# Patient Record
Sex: MALE | Race: BLACK OR AFRICAN AMERICAN | NOT HISPANIC OR LATINO | Employment: UNEMPLOYED | URBAN - METROPOLITAN AREA
[De-identification: names, ages, dates, MRNs, and addresses within clinical notes are randomized per-mention and may not be internally consistent; named-entity substitution may affect disease eponyms.]

---

## 2021-01-01 ENCOUNTER — HOSPITAL ENCOUNTER (INPATIENT)
Facility: HOSPITAL | Age: 51
LOS: 5 days | End: 2021-01-19
Attending: INTERNAL MEDICINE | Admitting: INTERNAL MEDICINE

## 2021-01-01 ENCOUNTER — APPOINTMENT (OUTPATIENT)
Dept: GENERAL RADIOLOGY | Facility: HOSPITAL | Age: 51
End: 2021-01-01

## 2021-01-01 ENCOUNTER — HOSPITAL ENCOUNTER (INPATIENT)
Facility: HOSPITAL | Age: 51
LOS: 5 days | End: 2021-01-14
Attending: EMERGENCY MEDICINE | Admitting: INTERNAL MEDICINE

## 2021-01-01 ENCOUNTER — APPOINTMENT (OUTPATIENT)
Dept: CT IMAGING | Facility: HOSPITAL | Age: 51
End: 2021-01-01

## 2021-01-01 ENCOUNTER — APPOINTMENT (OUTPATIENT)
Dept: CARDIOLOGY | Facility: HOSPITAL | Age: 51
End: 2021-01-01

## 2021-01-01 ENCOUNTER — APPOINTMENT (OUTPATIENT)
Dept: MRI IMAGING | Facility: HOSPITAL | Age: 51
End: 2021-01-01

## 2021-01-01 VITALS
SYSTOLIC BLOOD PRESSURE: 171 MMHG | BODY MASS INDEX: 30.54 KG/M2 | OXYGEN SATURATION: 98 % | RESPIRATION RATE: 18 BRPM | DIASTOLIC BLOOD PRESSURE: 93 MMHG | TEMPERATURE: 100 F | HEIGHT: 74 IN | HEART RATE: 92 BPM | WEIGHT: 238 LBS

## 2021-01-01 VITALS
DIASTOLIC BLOOD PRESSURE: 89 MMHG | SYSTOLIC BLOOD PRESSURE: 165 MMHG | RESPIRATION RATE: 16 BRPM | TEMPERATURE: 99.7 F | HEART RATE: 95 BPM | OXYGEN SATURATION: 97 %

## 2021-01-01 DIAGNOSIS — R09.02 HYPOXIA: Primary | ICD-10-CM

## 2021-01-01 DIAGNOSIS — R41.82 ALTERED MENTAL STATUS, UNSPECIFIED ALTERED MENTAL STATUS TYPE: ICD-10-CM

## 2021-01-01 DIAGNOSIS — Z86.73 RECENT CEREBROVASCULAR ACCIDENT (CVA): ICD-10-CM

## 2021-01-01 DIAGNOSIS — J18.9 PNEUMONIA OF BOTH LUNGS DUE TO INFECTIOUS ORGANISM, UNSPECIFIED PART OF LUNG: ICD-10-CM

## 2021-01-01 LAB
ALBUMIN SERPL-MCNC: 2.8 G/DL (ref 3.5–5.2)
ALBUMIN SERPL-MCNC: 3.6 G/DL (ref 3.5–5.2)
ALBUMIN/GLOB SERPL: 0.9 G/DL
ALP SERPL-CCNC: 100 U/L (ref 39–117)
ALP SERPL-CCNC: 114 U/L (ref 39–117)
ALT SERPL W P-5'-P-CCNC: 11 U/L (ref 1–41)
ALT SERPL W P-5'-P-CCNC: 8 U/L (ref 1–41)
AMMONIA BLD-SCNC: 37 UMOL/L (ref 16–60)
AMPHET+METHAMPHET UR QL: NEGATIVE
AMPHETAMINES UR QL: NEGATIVE
ANION GAP SERPL CALCULATED.3IONS-SCNC: 11 MMOL/L (ref 5–15)
ANION GAP SERPL CALCULATED.3IONS-SCNC: 12 MMOL/L (ref 5–15)
ANION GAP SERPL CALCULATED.3IONS-SCNC: 14 MMOL/L (ref 5–15)
ANION GAP SERPL CALCULATED.3IONS-SCNC: 8 MMOL/L (ref 5–15)
ANION GAP SERPL CALCULATED.3IONS-SCNC: 9 MMOL/L (ref 5–15)
ARTERIAL PATENCY WRIST A: ABNORMAL
ARTERIAL PATENCY WRIST A: ABNORMAL
ASCENDING AORTA: 3.2 CM
AST SERPL-CCNC: 12 U/L (ref 1–40)
AST SERPL-CCNC: 16 U/L (ref 1–40)
ATMOSPHERIC PRESS: ABNORMAL MM[HG]
ATMOSPHERIC PRESS: ABNORMAL MM[HG]
BACTERIA SPEC AEROBE CULT: NORMAL
BACTERIA SPEC AEROBE CULT: NORMAL
BACTERIA SPEC RESP CULT: NORMAL
BACTERIA UR QL AUTO: NORMAL /HPF
BARBITURATES UR QL SCN: NEGATIVE
BASE EXCESS BLDA CALC-SCNC: 6.9 MMOL/L (ref 0–2)
BASE EXCESS BLDA CALC-SCNC: 7.1 MMOL/L (ref 0–2)
BASOPHILS # BLD AUTO: 0.09 10*3/MM3 (ref 0–0.2)
BASOPHILS # BLD AUTO: 0.1 10*3/MM3 (ref 0–0.2)
BASOPHILS NFR BLD AUTO: 0.4 % (ref 0–1.5)
BASOPHILS NFR BLD AUTO: 0.5 % (ref 0–1.5)
BDY SITE: ABNORMAL
BDY SITE: ABNORMAL
BENZODIAZ UR QL SCN: NEGATIVE
BH CV ECHO MEAS - AO MAX PG (FULL): 4.2 MMHG
BH CV ECHO MEAS - AO MAX PG: 10.1 MMHG
BH CV ECHO MEAS - AO ROOT AREA (BSA CORRECTED): 1.4
BH CV ECHO MEAS - AO ROOT AREA: 8.4 CM^2
BH CV ECHO MEAS - AO ROOT DIAM: 3.3 CM
BH CV ECHO MEAS - AO V2 MAX: 159.3 CM/SEC
BH CV ECHO MEAS - ASC AORTA: 3.2 CM
BH CV ECHO MEAS - AVA(V,A): 2.2 CM^2
BH CV ECHO MEAS - AVA(V,D): 2.2 CM^2
BH CV ECHO MEAS - BSA(HAYCOCK): 2.4 M^2
BH CV ECHO MEAS - BSA: 2.3 M^2
BH CV ECHO MEAS - BZI_BMI: 30.6 KILOGRAMS/M^2
BH CV ECHO MEAS - BZI_METRIC_HEIGHT: 188 CM
BH CV ECHO MEAS - BZI_METRIC_WEIGHT: 108 KG
BH CV ECHO MEAS - EDV(CUBED): 104.1 ML
BH CV ECHO MEAS - EDV(MOD-SP2): 94 ML
BH CV ECHO MEAS - EDV(MOD-SP4): 89 ML
BH CV ECHO MEAS - EDV(TEICH): 102.6 ML
BH CV ECHO MEAS - EF(CUBED): 70.9 %
BH CV ECHO MEAS - EF(MOD-BP): 58 %
BH CV ECHO MEAS - EF(MOD-SP2): 59.6 %
BH CV ECHO MEAS - EF(MOD-SP4): 56.2 %
BH CV ECHO MEAS - EF(TEICH): 62.5 %
BH CV ECHO MEAS - ESV(CUBED): 30.3 ML
BH CV ECHO MEAS - ESV(MOD-SP2): 38 ML
BH CV ECHO MEAS - ESV(MOD-SP4): 39 ML
BH CV ECHO MEAS - ESV(TEICH): 38.5 ML
BH CV ECHO MEAS - FS: 33.7 %
BH CV ECHO MEAS - IVS/LVPW: 1.1
BH CV ECHO MEAS - IVSD: 1.2 CM
BH CV ECHO MEAS - LA DIMENSION: 3.3 CM
BH CV ECHO MEAS - LA/AO: 1
BH CV ECHO MEAS - LAD MAJOR: 5.5 CM
BH CV ECHO MEAS - LAT PEAK E' VEL: 11.2 CM/SEC
BH CV ECHO MEAS - LATERAL E/E' RATIO: 7
BH CV ECHO MEAS - LV DIASTOLIC VOL/BSA (35-75): 38 ML/M^2
BH CV ECHO MEAS - LV IVRT: 0.1 SEC
BH CV ECHO MEAS - LV MASS(C)D: 195.7 GRAMS
BH CV ECHO MEAS - LV MASS(C)DI: 83.7 GRAMS/M^2
BH CV ECHO MEAS - LV MAX PG: 5.9 MMHG
BH CV ECHO MEAS - LV MEAN PG: 3.4 MMHG
BH CV ECHO MEAS - LV SYSTOLIC VOL/BSA (12-30): 16.7 ML/M^2
BH CV ECHO MEAS - LV V1 MAX: 121.9 CM/SEC
BH CV ECHO MEAS - LV V1 MEAN: 84.6 CM/SEC
BH CV ECHO MEAS - LV V1 VTI: 21.4 CM
BH CV ECHO MEAS - LVIDD: 4.7 CM
BH CV ECHO MEAS - LVIDS: 3.1 CM
BH CV ECHO MEAS - LVLD AP2: 8 CM
BH CV ECHO MEAS - LVLD AP4: 8.2 CM
BH CV ECHO MEAS - LVLS AP2: 7.3 CM
BH CV ECHO MEAS - LVLS AP4: 7 CM
BH CV ECHO MEAS - LVOT AREA (M): 2.8 CM^2
BH CV ECHO MEAS - LVOT AREA: 2.9 CM^2
BH CV ECHO MEAS - LVOT DIAM: 1.9 CM
BH CV ECHO MEAS - LVPWD: 1.1 CM
BH CV ECHO MEAS - MED PEAK E' VEL: 9.4 CM/SEC
BH CV ECHO MEAS - MEDIAL E/E' RATIO: 8.2
BH CV ECHO MEAS - MV A MAX VEL: 89.8 CM/SEC
BH CV ECHO MEAS - MV DEC TIME: 0.17 SEC
BH CV ECHO MEAS - MV E MAX VEL: 79.1 CM/SEC
BH CV ECHO MEAS - MV E/A: 0.88
BH CV ECHO MEAS - PA ACC SLOPE: 790 CM/SEC^2
BH CV ECHO MEAS - PA ACC TIME: 0.13 SEC
BH CV ECHO MEAS - PA MAX PG: 6 MMHG
BH CV ECHO MEAS - PA PR(ACCEL): 20.9 MMHG
BH CV ECHO MEAS - PA V2 MAX: 122.7 CM/SEC
BH CV ECHO MEAS - SI(CUBED): 31.5 ML/M^2
BH CV ECHO MEAS - SI(LVOT): 26.6 ML/M^2
BH CV ECHO MEAS - SI(MOD-SP2): 23.9 ML/M^2
BH CV ECHO MEAS - SI(MOD-SP4): 21.4 ML/M^2
BH CV ECHO MEAS - SI(TEICH): 27.4 ML/M^2
BH CV ECHO MEAS - SV(CUBED): 73.8 ML
BH CV ECHO MEAS - SV(LVOT): 62.3 ML
BH CV ECHO MEAS - SV(MOD-SP2): 56 ML
BH CV ECHO MEAS - SV(MOD-SP4): 50 ML
BH CV ECHO MEAS - SV(TEICH): 64.1 ML
BH CV ECHO MEAS - TAPSE (>1.6): 2.2 CM
BH CV ECHO MEASUREMENTS AVERAGE E/E' RATIO: 7.68
BH CV VAS BP LEFT ARM: NORMAL MMHG
BH CV XLRA - RV BASE: 3.8 CM
BH CV XLRA - RV LENGTH: 9.7 CM
BH CV XLRA - RV MID: 2.9 CM
BH CV XLRA - TDI S': 18.2 CM/SEC
BILIRUB SERPL-MCNC: 0.6 MG/DL (ref 0–1.2)
BILIRUB SERPL-MCNC: 0.6 MG/DL (ref 0–1.2)
BILIRUB UR QL STRIP: NEGATIVE
BODY TEMPERATURE: 37 C
BODY TEMPERATURE: 37 C
BUN SERPL-MCNC: 12 MG/DL (ref 6–20)
BUN SERPL-MCNC: 7 MG/DL (ref 6–20)
BUN SERPL-MCNC: 7 MG/DL (ref 6–20)
BUN SERPL-MCNC: 8 MG/DL (ref 6–20)
BUN SERPL-MCNC: 8 MG/DL (ref 6–20)
BUN/CREAT SERPL: 5.5 (ref 7–25)
BUN/CREAT SERPL: 7 (ref 7–25)
BUN/CREAT SERPL: 7.3 (ref 7–25)
BUN/CREAT SERPL: 7.8 (ref 7–25)
BUPRENORPHINE SERPL-MCNC: NEGATIVE NG/ML
CALCIUM SPEC-SCNC: 8.5 MG/DL (ref 8.6–10.5)
CALCIUM SPEC-SCNC: 8.6 MG/DL (ref 8.6–10.5)
CALCIUM SPEC-SCNC: 8.7 MG/DL (ref 8.6–10.5)
CANNABINOIDS SERPL QL: NEGATIVE
CHLORIDE SERPL-SCNC: 100 MMOL/L (ref 98–107)
CHLORIDE SERPL-SCNC: 100 MMOL/L (ref 98–107)
CHLORIDE SERPL-SCNC: 103 MMOL/L (ref 98–107)
CHLORIDE SERPL-SCNC: 98 MMOL/L (ref 98–107)
CHLORIDE SERPL-SCNC: 99 MMOL/L (ref 98–107)
CHOLEST SERPL-MCNC: 122 MG/DL (ref 0–200)
CHOLEST SERPL-MCNC: 124 MG/DL (ref 0–200)
CLARITY UR: CLEAR
CO2 BLDA-SCNC: 33.8 MMOL/L (ref 22–33)
CO2 BLDA-SCNC: 36 MMOL/L (ref 22–33)
CO2 SERPL-SCNC: 25 MMOL/L (ref 22–29)
CO2 SERPL-SCNC: 25 MMOL/L (ref 22–29)
CO2 SERPL-SCNC: 27 MMOL/L (ref 22–29)
CO2 SERPL-SCNC: 28 MMOL/L (ref 22–29)
CO2 SERPL-SCNC: 29 MMOL/L (ref 22–29)
COCAINE UR QL: POSITIVE
COHGB MFR BLD: 1.6 % (ref 0–2)
COHGB MFR BLD: 1.6 % (ref 0–2)
COLOR UR: ABNORMAL
CREAT SERPL-MCNC: 0.96 MG/DL (ref 0.76–1.27)
CREAT SERPL-MCNC: 1.02 MG/DL (ref 0.76–1.27)
CREAT SERPL-MCNC: 1.04 MG/DL (ref 0.76–1.27)
CREAT SERPL-MCNC: 1.15 MG/DL (ref 0.76–1.27)
CREAT SERPL-MCNC: 1.28 MG/DL (ref 0.76–1.27)
CRP SERPL-MCNC: 2.83 MG/DL (ref 0–0.5)
D-LACTATE SERPL-SCNC: 0.9 MMOL/L (ref 0.5–2)
DEPRECATED RDW RBC AUTO: 36.1 FL (ref 37–54)
DEPRECATED RDW RBC AUTO: 36.9 FL (ref 37–54)
DEPRECATED RDW RBC AUTO: 37.4 FL (ref 37–54)
DEPRECATED RDW RBC AUTO: 37.4 FL (ref 37–54)
DEPRECATED RDW RBC AUTO: 37.7 FL (ref 37–54)
EOSINOPHIL # BLD AUTO: 0.06 10*3/MM3 (ref 0–0.4)
EOSINOPHIL # BLD AUTO: 0.08 10*3/MM3 (ref 0–0.4)
EOSINOPHIL # BLD AUTO: 0.13 10*3/MM3 (ref 0–0.4)
EOSINOPHIL # BLD AUTO: 0.53 10*3/MM3 (ref 0–0.4)
EOSINOPHIL NFR BLD AUTO: 0.3 % (ref 0.3–6.2)
EOSINOPHIL NFR BLD AUTO: 0.4 % (ref 0.3–6.2)
EOSINOPHIL NFR BLD AUTO: 0.7 % (ref 0.3–6.2)
EOSINOPHIL NFR BLD AUTO: 3 % (ref 0.3–6.2)
EPAP: 0
EPAP: 0
ERYTHROCYTE [DISTWIDTH] IN BLOOD BY AUTOMATED COUNT: 11.1 % (ref 12.3–15.4)
ERYTHROCYTE [DISTWIDTH] IN BLOOD BY AUTOMATED COUNT: 11.2 % (ref 12.3–15.4)
ERYTHROCYTE [DISTWIDTH] IN BLOOD BY AUTOMATED COUNT: 11.2 % (ref 12.3–15.4)
ERYTHROCYTE [SEDIMENTATION RATE] IN BLOOD: 35 MM/HR (ref 0–20)
FLUAV RNA RESP QL NAA+PROBE: NOT DETECTED
FLUBV RNA RESP QL NAA+PROBE: NOT DETECTED
GFR SERPL CREATININE-BSD FRML MDRD: 100 ML/MIN/1.73
GFR SERPL CREATININE-BSD FRML MDRD: 72 ML/MIN/1.73
GFR SERPL CREATININE-BSD FRML MDRD: 81 ML/MIN/1.73
GFR SERPL CREATININE-BSD FRML MDRD: 93 ML/MIN/1.73
GLOBULIN UR ELPH-MCNC: 4 GM/DL
GLUCOSE BLDC GLUCOMTR-MCNC: 121 MG/DL (ref 70–130)
GLUCOSE BLDC GLUCOMTR-MCNC: 126 MG/DL (ref 70–130)
GLUCOSE BLDC GLUCOMTR-MCNC: 135 MG/DL (ref 70–130)
GLUCOSE BLDC GLUCOMTR-MCNC: 148 MG/DL (ref 70–130)
GLUCOSE BLDC GLUCOMTR-MCNC: 152 MG/DL (ref 70–130)
GLUCOSE BLDC GLUCOMTR-MCNC: 163 MG/DL (ref 70–130)
GLUCOSE BLDC GLUCOMTR-MCNC: 172 MG/DL (ref 70–130)
GLUCOSE BLDC GLUCOMTR-MCNC: 175 MG/DL (ref 70–130)
GLUCOSE BLDC GLUCOMTR-MCNC: 187 MG/DL (ref 70–130)
GLUCOSE BLDC GLUCOMTR-MCNC: 190 MG/DL (ref 70–130)
GLUCOSE BLDC GLUCOMTR-MCNC: 202 MG/DL (ref 70–130)
GLUCOSE BLDC GLUCOMTR-MCNC: 206 MG/DL (ref 70–130)
GLUCOSE BLDC GLUCOMTR-MCNC: 220 MG/DL (ref 70–130)
GLUCOSE BLDC GLUCOMTR-MCNC: 228 MG/DL (ref 70–130)
GLUCOSE BLDC GLUCOMTR-MCNC: 250 MG/DL (ref 70–130)
GLUCOSE BLDC GLUCOMTR-MCNC: 258 MG/DL (ref 70–130)
GLUCOSE BLDC GLUCOMTR-MCNC: 263 MG/DL (ref 70–130)
GLUCOSE BLDC GLUCOMTR-MCNC: 99 MG/DL (ref 70–130)
GLUCOSE SERPL-MCNC: 140 MG/DL (ref 65–99)
GLUCOSE SERPL-MCNC: 168 MG/DL (ref 65–99)
GLUCOSE SERPL-MCNC: 209 MG/DL (ref 65–99)
GLUCOSE SERPL-MCNC: 252 MG/DL (ref 65–99)
GLUCOSE SERPL-MCNC: 83 MG/DL (ref 65–99)
GLUCOSE UR STRIP-MCNC: NEGATIVE MG/DL
GRAM STN SPEC: NORMAL
HBA1C MFR BLD: 8 % (ref 4.8–5.6)
HCO3 BLDA-SCNC: 32.3 MMOL/L (ref 20–26)
HCO3 BLDA-SCNC: 34.2 MMOL/L (ref 20–26)
HCT VFR BLD AUTO: 37.7 % (ref 37.5–51)
HCT VFR BLD AUTO: 38.1 % (ref 37.5–51)
HCT VFR BLD AUTO: 38.5 % (ref 37.5–51)
HCT VFR BLD AUTO: 39.8 % (ref 37.5–51)
HCT VFR BLD AUTO: 39.9 % (ref 37.5–51)
HCT VFR BLD CALC: 36.1 %
HCT VFR BLD CALC: 37.7 %
HDLC SERPL-MCNC: 48 MG/DL (ref 40–60)
HGB BLD-MCNC: 12.8 G/DL (ref 13–17.7)
HGB BLD-MCNC: 13 G/DL (ref 13–17.7)
HGB BLD-MCNC: 13.1 G/DL (ref 13–17.7)
HGB BLD-MCNC: 13.3 G/DL (ref 13–17.7)
HGB BLD-MCNC: 13.7 G/DL (ref 13–17.7)
HGB BLDA-MCNC: 11.8 G/DL (ref 13.5–17.5)
HGB BLDA-MCNC: 12.3 G/DL (ref 13.5–17.5)
HGB UR QL STRIP.AUTO: NEGATIVE
HOLD SPECIMEN: NORMAL
HOLD SPECIMEN: NORMAL
HYALINE CASTS UR QL AUTO: NORMAL /LPF
IMM GRANULOCYTES # BLD AUTO: 0.07 10*3/MM3 (ref 0–0.05)
IMM GRANULOCYTES # BLD AUTO: 0.07 10*3/MM3 (ref 0–0.05)
IMM GRANULOCYTES # BLD AUTO: 0.11 10*3/MM3 (ref 0–0.05)
IMM GRANULOCYTES # BLD AUTO: 0.14 10*3/MM3 (ref 0–0.05)
IMM GRANULOCYTES NFR BLD AUTO: 0.4 % (ref 0–0.5)
IMM GRANULOCYTES NFR BLD AUTO: 0.4 % (ref 0–0.5)
IMM GRANULOCYTES NFR BLD AUTO: 0.6 % (ref 0–0.5)
IMM GRANULOCYTES NFR BLD AUTO: 0.6 % (ref 0–0.5)
INHALED O2 CONCENTRATION: 30 %
INHALED O2 CONCENTRATION: 36 %
INR PPP: 1.02 (ref 0.85–1.16)
IPAP: 0
IPAP: 0
IVRT: 99 MSEC
KETONES UR QL STRIP: ABNORMAL
LDLC SERPL CALC-MCNC: 58 MG/DL (ref 0–100)
LDLC/HDLC SERPL: 1.21 {RATIO}
LEFT ATRIUM VOLUME INDEX: 24.4 ML/M^2
LEFT ATRIUM VOLUME: 57 ML
LEUKOCYTE ESTERASE UR QL STRIP.AUTO: NEGATIVE
LYMPHOCYTES # BLD AUTO: 2.36 10*3/MM3 (ref 0.7–3.1)
LYMPHOCYTES # BLD AUTO: 3.19 10*3/MM3 (ref 0.7–3.1)
LYMPHOCYTES # BLD AUTO: 3.23 10*3/MM3 (ref 0.7–3.1)
LYMPHOCYTES # BLD AUTO: 3.24 10*3/MM3 (ref 0.7–3.1)
LYMPHOCYTES NFR BLD AUTO: 10.7 % (ref 19.6–45.3)
LYMPHOCYTES NFR BLD AUTO: 16.5 % (ref 19.6–45.3)
LYMPHOCYTES NFR BLD AUTO: 17.1 % (ref 19.6–45.3)
LYMPHOCYTES NFR BLD AUTO: 18.4 % (ref 19.6–45.3)
MAGNESIUM SERPL-MCNC: 1.9 MG/DL (ref 1.6–2.6)
MCH RBC QN AUTO: 30.1 PG (ref 26.6–33)
MCH RBC QN AUTO: 30.8 PG (ref 26.6–33)
MCH RBC QN AUTO: 30.9 PG (ref 26.6–33)
MCH RBC QN AUTO: 31.2 PG (ref 26.6–33)
MCH RBC QN AUTO: 31.6 PG (ref 26.6–33)
MCHC RBC AUTO-ENTMCNC: 33.3 G/DL (ref 31.5–35.7)
MCHC RBC AUTO-ENTMCNC: 34 G/DL (ref 31.5–35.7)
MCHC RBC AUTO-ENTMCNC: 34 G/DL (ref 31.5–35.7)
MCHC RBC AUTO-ENTMCNC: 34.1 G/DL (ref 31.5–35.7)
MCHC RBC AUTO-ENTMCNC: 34.4 G/DL (ref 31.5–35.7)
MCV RBC AUTO: 89.4 FL (ref 79–97)
MCV RBC AUTO: 90.3 FL (ref 79–97)
MCV RBC AUTO: 90.8 FL (ref 79–97)
MCV RBC AUTO: 92 FL (ref 79–97)
MCV RBC AUTO: 92.5 FL (ref 79–97)
METHADONE UR QL SCN: NEGATIVE
METHGB BLD QL: 0.1 % (ref 0–1.5)
METHGB BLD QL: 0.2 % (ref 0–1.5)
MODALITY: ABNORMAL
MODALITY: ABNORMAL
MONOCYTES # BLD AUTO: 2 10*3/MM3 (ref 0.1–0.9)
MONOCYTES # BLD AUTO: 2.43 10*3/MM3 (ref 0.1–0.9)
MONOCYTES # BLD AUTO: 2.44 10*3/MM3 (ref 0.1–0.9)
MONOCYTES # BLD AUTO: 3.32 10*3/MM3 (ref 0.1–0.9)
MONOCYTES NFR BLD AUTO: 10.5 % (ref 5–12)
MONOCYTES NFR BLD AUTO: 12.5 % (ref 5–12)
MONOCYTES NFR BLD AUTO: 13.9 % (ref 5–12)
MONOCYTES NFR BLD AUTO: 15 % (ref 5–12)
MRSA DNA SPEC QL NAA+PROBE: NEGATIVE
NEUTROPHILS NFR BLD AUTO: 11.18 10*3/MM3 (ref 1.7–7)
NEUTROPHILS NFR BLD AUTO: 13.42 10*3/MM3 (ref 1.7–7)
NEUTROPHILS NFR BLD AUTO: 13.47 10*3/MM3 (ref 1.7–7)
NEUTROPHILS NFR BLD AUTO: 16.09 10*3/MM3 (ref 1.7–7)
NEUTROPHILS NFR BLD AUTO: 63.8 % (ref 42.7–76)
NEUTROPHILS NFR BLD AUTO: 69.2 % (ref 42.7–76)
NEUTROPHILS NFR BLD AUTO: 71.1 % (ref 42.7–76)
NEUTROPHILS NFR BLD AUTO: 73 % (ref 42.7–76)
NITRITE UR QL STRIP: NEGATIVE
NOTE: ABNORMAL
NOTE: ABNORMAL
NRBC BLD AUTO-RTO: 0 /100 WBC (ref 0–0.2)
NT-PROBNP SERPL-MCNC: 67.3 PG/ML (ref 0–900)
OPIATES UR QL: NEGATIVE
OXYCODONE UR QL SCN: NEGATIVE
OXYHGB MFR BLDV: 96.9 % (ref 94–99)
OXYHGB MFR BLDV: 97.2 % (ref 94–99)
PAW @ PEAK INSP FLOW SETTING VENT: 0 CMH2O
PAW @ PEAK INSP FLOW SETTING VENT: 0 CMH2O
PCO2 BLDA: 48.6 MM HG (ref 35–45)
PCO2 BLDA: 60 MM HG (ref 35–45)
PCO2 TEMP ADJ BLD: 48.6 MM HG (ref 35–48)
PCO2 TEMP ADJ BLD: 60 MM HG (ref 35–48)
PCP UR QL SCN: NEGATIVE
PH BLDA: 7.36 PH UNITS (ref 7.35–7.45)
PH BLDA: 7.43 PH UNITS (ref 7.35–7.45)
PH UR STRIP.AUTO: <=5 [PH] (ref 5–8)
PH, TEMP CORRECTED: 7.36 PH UNITS
PH, TEMP CORRECTED: 7.43 PH UNITS
PHOSPHATE SERPL-MCNC: 2.5 MG/DL (ref 2.5–4.5)
PHOSPHATE SERPL-MCNC: 3.3 MG/DL (ref 2.5–4.5)
PLATELET # BLD AUTO: 499 10*3/MM3 (ref 140–450)
PLATELET # BLD AUTO: 518 10*3/MM3 (ref 140–450)
PLATELET # BLD AUTO: 536 10*3/MM3 (ref 140–450)
PLATELET # BLD AUTO: 555 10*3/MM3 (ref 140–450)
PLATELET # BLD AUTO: 603 10*3/MM3 (ref 140–450)
PMV BLD AUTO: 10.6 FL (ref 6–12)
PMV BLD AUTO: 10.8 FL (ref 6–12)
PMV BLD AUTO: 10.9 FL (ref 6–12)
PMV BLD AUTO: 11.2 FL (ref 6–12)
PMV BLD AUTO: 11.4 FL (ref 6–12)
PO2 BLDA: 103 MM HG (ref 83–108)
PO2 BLDA: 99.9 MM HG (ref 83–108)
PO2 TEMP ADJ BLD: 103 MM HG (ref 83–108)
PO2 TEMP ADJ BLD: 99.9 MM HG (ref 83–108)
POTASSIUM SERPL-SCNC: 3.4 MMOL/L (ref 3.5–5.2)
POTASSIUM SERPL-SCNC: 3.7 MMOL/L (ref 3.5–5.2)
POTASSIUM SERPL-SCNC: 3.7 MMOL/L (ref 3.5–5.2)
POTASSIUM SERPL-SCNC: 3.9 MMOL/L (ref 3.5–5.2)
POTASSIUM SERPL-SCNC: 3.9 MMOL/L (ref 3.5–5.2)
PROCALCITONIN SERPL-MCNC: 0.06 NG/ML (ref 0–0.25)
PROPOXYPH UR QL: NEGATIVE
PROT SERPL-MCNC: 7 G/DL (ref 6–8.5)
PROT SERPL-MCNC: 7.6 G/DL (ref 6–8.5)
PROT UR QL STRIP: ABNORMAL
PROTHROMBIN TIME: 13.1 SECONDS (ref 11.5–14)
QT INTERVAL: 394 MS
QTC INTERVAL: 500 MS
RBC # BLD AUTO: 4.1 10*6/MM3 (ref 4.14–5.8)
RBC # BLD AUTO: 4.12 10*6/MM3 (ref 4.14–5.8)
RBC # BLD AUTO: 4.24 10*6/MM3 (ref 4.14–5.8)
RBC # BLD AUTO: 4.42 10*6/MM3 (ref 4.14–5.8)
RBC # BLD AUTO: 4.45 10*6/MM3 (ref 4.14–5.8)
RBC # UR: NORMAL /HPF
REF LAB TEST METHOD: NORMAL
S PNEUM AG SPEC QL LA: NEGATIVE
SARS-COV-2 RNA RESP QL NAA+PROBE: NOT DETECTED
SODIUM SERPL-SCNC: 135 MMOL/L (ref 136–145)
SODIUM SERPL-SCNC: 136 MMOL/L (ref 136–145)
SODIUM SERPL-SCNC: 138 MMOL/L (ref 136–145)
SODIUM SERPL-SCNC: 139 MMOL/L (ref 136–145)
SODIUM SERPL-SCNC: 140 MMOL/L (ref 136–145)
SP GR UR STRIP: 1.03 (ref 1–1.03)
SQUAMOUS #/AREA URNS HPF: NORMAL /HPF
TOTAL RATE: 0 BREATHS/MINUTE
TOTAL RATE: 0 BREATHS/MINUTE
TRICYCLICS UR QL SCN: NEGATIVE
TRIGL SERPL-MCNC: 79 MG/DL (ref 0–150)
TRIGL SERPL-MCNC: 87 MG/DL (ref 0–150)
TROPONIN T SERPL-MCNC: 0.02 NG/ML (ref 0–0.03)
UROBILINOGEN UR QL STRIP: ABNORMAL
VLDLC SERPL-MCNC: 16 MG/DL (ref 5–40)
WBC # BLD AUTO: 17.54 10*3/MM3 (ref 3.4–10.8)
WBC # BLD AUTO: 18.96 10*3/MM3 (ref 3.4–10.8)
WBC # BLD AUTO: 19.37 10*3/MM3 (ref 3.4–10.8)
WBC # BLD AUTO: 22.06 10*3/MM3 (ref 3.4–10.8)
WBC # BLD AUTO: 23.38 10*3/MM3 (ref 3.4–10.8)
WBC UR QL AUTO: NORMAL /HPF
WHOLE BLOOD HOLD SPECIMEN: NORMAL
WHOLE BLOOD HOLD SPECIMEN: NORMAL

## 2021-01-01 PROCEDURE — 99233 SBSQ HOSP IP/OBS HIGH 50: CPT | Performed by: INTERNAL MEDICINE

## 2021-01-01 PROCEDURE — 84478 ASSAY OF TRIGLYCERIDES: CPT

## 2021-01-01 PROCEDURE — 82962 GLUCOSE BLOOD TEST: CPT

## 2021-01-01 PROCEDURE — 25010000002 METOCLOPRAMIDE PER 10 MG: Performed by: NURSE PRACTITIONER

## 2021-01-01 PROCEDURE — 82375 ASSAY CARBOXYHB QUANT: CPT

## 2021-01-01 PROCEDURE — 36600 WITHDRAWAL OF ARTERIAL BLOOD: CPT

## 2021-01-01 PROCEDURE — 92610 EVALUATE SWALLOWING FUNCTION: CPT | Performed by: SPEECH-LANGUAGE PATHOLOGIST

## 2021-01-01 PROCEDURE — 25010000002 LORAZEPAM PER 2 MG: Performed by: NURSE PRACTITIONER

## 2021-01-01 PROCEDURE — 80048 BASIC METABOLIC PNL TOTAL CA: CPT | Performed by: INTERNAL MEDICINE

## 2021-01-01 PROCEDURE — 87899 AGENT NOS ASSAY W/OPTIC: CPT | Performed by: PHYSICIAN ASSISTANT

## 2021-01-01 PROCEDURE — 25010000002 FUROSEMIDE PER 20 MG: Performed by: NURSE PRACTITIONER

## 2021-01-01 PROCEDURE — 72126 CT NECK SPINE W/DYE: CPT

## 2021-01-01 PROCEDURE — 93306 TTE W/DOPPLER COMPLETE: CPT

## 2021-01-01 PROCEDURE — 25010000002 KETOROLAC TROMETHAMINE PER 15 MG: Performed by: INTERNAL MEDICINE

## 2021-01-01 PROCEDURE — 93005 ELECTROCARDIOGRAM TRACING: CPT | Performed by: PHYSICIAN ASSISTANT

## 2021-01-01 PROCEDURE — 87636 SARSCOV2 & INF A&B AMP PRB: CPT | Performed by: PHYSICIAN ASSISTANT

## 2021-01-01 PROCEDURE — 70496 CT ANGIOGRAPHY HEAD: CPT

## 2021-01-01 PROCEDURE — 85025 COMPLETE CBC W/AUTO DIFF WBC: CPT | Performed by: INTERNAL MEDICINE

## 2021-01-01 PROCEDURE — 71275 CT ANGIOGRAPHY CHEST: CPT

## 2021-01-01 PROCEDURE — 25010000002 LORAZEPAM PER 2 MG: Performed by: INTERNAL MEDICINE

## 2021-01-01 PROCEDURE — 25010000002 CEFEPIME PER 500 MG: Performed by: INTERNAL MEDICINE

## 2021-01-01 PROCEDURE — 25010000002 MORPHINE PER 10 MG: Performed by: NURSE PRACTITIONER

## 2021-01-01 PROCEDURE — 80053 COMPREHEN METABOLIC PANEL: CPT | Performed by: PHYSICIAN ASSISTANT

## 2021-01-01 PROCEDURE — 25010000002 VANCOMYCIN 10 G RECONSTITUTED SOLUTION

## 2021-01-01 PROCEDURE — 25010000002 HEPARIN (PORCINE) PER 1000 UNITS: Performed by: PHYSICIAN ASSISTANT

## 2021-01-01 PROCEDURE — 84145 PROCALCITONIN (PCT): CPT | Performed by: PHYSICIAN ASSISTANT

## 2021-01-01 PROCEDURE — 74018 RADEX ABDOMEN 1 VIEW: CPT

## 2021-01-01 PROCEDURE — P9612 CATHETERIZE FOR URINE SPEC: HCPCS

## 2021-01-01 PROCEDURE — 94799 UNLISTED PULMONARY SVC/PX: CPT

## 2021-01-01 PROCEDURE — 94669 MECHANICAL CHEST WALL OSCILL: CPT

## 2021-01-01 PROCEDURE — 85025 COMPLETE CBC W/AUTO DIFF WBC: CPT | Performed by: PHYSICIAN ASSISTANT

## 2021-01-01 PROCEDURE — 83735 ASSAY OF MAGNESIUM: CPT

## 2021-01-01 PROCEDURE — 80061 LIPID PANEL: CPT | Performed by: NURSE PRACTITIONER

## 2021-01-01 PROCEDURE — 83050 HGB METHEMOGLOBIN QUAN: CPT

## 2021-01-01 PROCEDURE — 99291 CRITICAL CARE FIRST HOUR: CPT | Performed by: INTERNAL MEDICINE

## 2021-01-01 PROCEDURE — 71045 X-RAY EXAM CHEST 1 VIEW: CPT

## 2021-01-01 PROCEDURE — 99232 SBSQ HOSP IP/OBS MODERATE 35: CPT | Performed by: INTERNAL MEDICINE

## 2021-01-01 PROCEDURE — 25010000002 HALOPERIDOL LACTATE PER 5 MG: Performed by: PHYSICIAN ASSISTANT

## 2021-01-01 PROCEDURE — 82465 ASSAY BLD/SERUM CHOLESTEROL: CPT

## 2021-01-01 PROCEDURE — 80053 COMPREHEN METABOLIC PANEL: CPT

## 2021-01-01 PROCEDURE — 25010000002 PHENOBARBITAL PER 120 MG: Performed by: INTERNAL MEDICINE

## 2021-01-01 PROCEDURE — 86140 C-REACTIVE PROTEIN: CPT | Performed by: INTERNAL MEDICINE

## 2021-01-01 PROCEDURE — 80048 BASIC METABOLIC PNL TOTAL CA: CPT | Performed by: PHYSICIAN ASSISTANT

## 2021-01-01 PROCEDURE — 70450 CT HEAD/BRAIN W/O DYE: CPT

## 2021-01-01 PROCEDURE — 63710000001 INSULIN REGULAR HUMAN PER 5 UNITS: Performed by: INTERNAL MEDICINE

## 2021-01-01 PROCEDURE — 82140 ASSAY OF AMMONIA: CPT | Performed by: INTERNAL MEDICINE

## 2021-01-01 PROCEDURE — 87070 CULTURE OTHR SPECIMN AEROBIC: CPT | Performed by: NURSE PRACTITIONER

## 2021-01-01 PROCEDURE — 70498 CT ANGIOGRAPHY NECK: CPT

## 2021-01-01 PROCEDURE — 25010000002 HEPARIN (PORCINE) PER 1000 UNITS: Performed by: INTERNAL MEDICINE

## 2021-01-01 PROCEDURE — 25010000002 NALOXONE PER 1 MG: Performed by: INTERNAL MEDICINE

## 2021-01-01 PROCEDURE — 25010000002 HALOPERIDOL LACTATE PER 5 MG: Performed by: INTERNAL MEDICINE

## 2021-01-01 PROCEDURE — 0042T HC CT CEREBRAL PERFUSION W/WO CONTRAST: CPT

## 2021-01-01 PROCEDURE — 85610 PROTHROMBIN TIME: CPT | Performed by: PHYSICIAN ASSISTANT

## 2021-01-01 PROCEDURE — 71250 CT THORAX DX C-: CPT

## 2021-01-01 PROCEDURE — 99223 1ST HOSP IP/OBS HIGH 75: CPT | Performed by: NURSE PRACTITIONER

## 2021-01-01 PROCEDURE — 25010000002 MEROPENEM PER 100 MG: Performed by: PHYSICIAN ASSISTANT

## 2021-01-01 PROCEDURE — 92523 SPEECH SOUND LANG COMPREHEN: CPT

## 2021-01-01 PROCEDURE — 25010000002 NALOXONE PER 1 MG: Performed by: NURSE PRACTITIONER

## 2021-01-01 PROCEDURE — 83036 HEMOGLOBIN GLYCOSYLATED A1C: CPT | Performed by: NURSE PRACTITIONER

## 2021-01-01 PROCEDURE — 94660 CPAP INITIATION&MGMT: CPT

## 2021-01-01 PROCEDURE — 25010000002 MORPHINE PER 10 MG: Performed by: INTERNAL MEDICINE

## 2021-01-01 PROCEDURE — 0 IOPAMIDOL PER 1 ML: Performed by: INTERNAL MEDICINE

## 2021-01-01 PROCEDURE — 83880 ASSAY OF NATRIURETIC PEPTIDE: CPT | Performed by: PHYSICIAN ASSISTANT

## 2021-01-01 PROCEDURE — 87205 SMEAR GRAM STAIN: CPT | Performed by: NURSE PRACTITIONER

## 2021-01-01 PROCEDURE — 84100 ASSAY OF PHOSPHORUS: CPT

## 2021-01-01 PROCEDURE — 0 IOPAMIDOL PER 1 ML: Performed by: EMERGENCY MEDICINE

## 2021-01-01 PROCEDURE — 93010 ELECTROCARDIOGRAM REPORT: CPT | Performed by: INTERNAL MEDICINE

## 2021-01-01 PROCEDURE — 85027 COMPLETE CBC AUTOMATED: CPT | Performed by: PHYSICIAN ASSISTANT

## 2021-01-01 PROCEDURE — 92507 TX SP LANG VOICE COMM INDIV: CPT

## 2021-01-01 PROCEDURE — 93005 ELECTROCARDIOGRAM TRACING: CPT | Performed by: NURSE PRACTITIONER

## 2021-01-01 PROCEDURE — 85652 RBC SED RATE AUTOMATED: CPT | Performed by: INTERNAL MEDICINE

## 2021-01-01 PROCEDURE — 83605 ASSAY OF LACTIC ACID: CPT | Performed by: PHYSICIAN ASSISTANT

## 2021-01-01 PROCEDURE — 82805 BLOOD GASES W/O2 SATURATION: CPT

## 2021-01-01 PROCEDURE — 93306 TTE W/DOPPLER COMPLETE: CPT | Performed by: INTERNAL MEDICINE

## 2021-01-01 PROCEDURE — 92610 EVALUATE SWALLOWING FUNCTION: CPT

## 2021-01-01 PROCEDURE — 94640 AIRWAY INHALATION TREATMENT: CPT

## 2021-01-01 PROCEDURE — 99223 1ST HOSP IP/OBS HIGH 75: CPT | Performed by: INTERNAL MEDICINE

## 2021-01-01 PROCEDURE — 87040 BLOOD CULTURE FOR BACTERIA: CPT | Performed by: PHYSICIAN ASSISTANT

## 2021-01-01 PROCEDURE — 87641 MR-STAPH DNA AMP PROBE: CPT

## 2021-01-01 PROCEDURE — 84484 ASSAY OF TROPONIN QUANT: CPT | Performed by: PHYSICIAN ASSISTANT

## 2021-01-01 PROCEDURE — 81001 URINALYSIS AUTO W/SCOPE: CPT | Performed by: PHYSICIAN ASSISTANT

## 2021-01-01 PROCEDURE — 99285 EMERGENCY DEPT VISIT HI MDM: CPT

## 2021-01-01 PROCEDURE — 80306 DRUG TEST PRSMV INSTRMNT: CPT | Performed by: PHYSICIAN ASSISTANT

## 2021-01-01 RX ORDER — LABETALOL HYDROCHLORIDE 5 MG/ML
20 INJECTION, SOLUTION INTRAVENOUS
Status: DISCONTINUED | OUTPATIENT
Start: 2021-01-01 | End: 2021-01-01 | Stop reason: HOSPADM

## 2021-01-01 RX ORDER — MORPHINE SULFATE 2 MG/ML
2 INJECTION, SOLUTION INTRAMUSCULAR; INTRAVENOUS EVERY 4 HOURS PRN
Status: CANCELLED | OUTPATIENT
Start: 2021-01-01 | End: 2021-01-01

## 2021-01-01 RX ORDER — LORAZEPAM 2 MG/ML
0.5 INJECTION INTRAMUSCULAR ONCE
Status: COMPLETED | OUTPATIENT
Start: 2021-01-01 | End: 2021-01-01

## 2021-01-01 RX ORDER — LORAZEPAM 2 MG/ML
2 INJECTION INTRAMUSCULAR EVERY 4 HOURS PRN
Status: DISCONTINUED | OUTPATIENT
Start: 2021-01-01 | End: 2021-01-01 | Stop reason: HOSPADM

## 2021-01-01 RX ORDER — SODIUM CHLORIDE 0.9 % (FLUSH) 0.9 %
10 SYRINGE (ML) INJECTION AS NEEDED
Status: CANCELLED | OUTPATIENT
Start: 2021-01-01

## 2021-01-01 RX ORDER — HEPARIN SODIUM 5000 [USP'U]/ML
5000 INJECTION, SOLUTION INTRAVENOUS; SUBCUTANEOUS EVERY 8 HOURS SCHEDULED
Status: DISCONTINUED | OUTPATIENT
Start: 2021-01-01 | End: 2021-01-01 | Stop reason: HOSPADM

## 2021-01-01 RX ORDER — GLYCOPYRROLATE 0.2 MG/ML
0.4 INJECTION INTRAMUSCULAR; INTRAVENOUS EVERY 4 HOURS PRN
Status: DISCONTINUED | OUTPATIENT
Start: 2021-01-01 | End: 2021-01-01 | Stop reason: HOSPADM

## 2021-01-01 RX ORDER — SODIUM CHLORIDE 0.9 % (FLUSH) 0.9 %
10 SYRINGE (ML) INJECTION AS NEEDED
Status: DISCONTINUED | OUTPATIENT
Start: 2021-01-01 | End: 2021-01-01 | Stop reason: HOSPADM

## 2021-01-01 RX ORDER — DOCUSATE SODIUM 50 MG/5 ML
100 LIQUID (ML) ORAL 2 TIMES DAILY
Status: CANCELLED | OUTPATIENT
Start: 2021-01-01

## 2021-01-01 RX ORDER — NALOXONE HCL 0.4 MG/ML
2 VIAL (ML) INJECTION ONCE
Status: COMPLETED | OUTPATIENT
Start: 2021-01-01 | End: 2021-01-01

## 2021-01-01 RX ORDER — ASPIRIN 81 MG/1
81 TABLET, CHEWABLE ORAL DAILY
Status: DISCONTINUED | OUTPATIENT
Start: 2021-01-01 | End: 2021-01-01 | Stop reason: HOSPADM

## 2021-01-01 RX ORDER — SODIUM CHLORIDE 0.9 % (FLUSH) 0.9 %
10 SYRINGE (ML) INJECTION AS NEEDED
Status: DISCONTINUED | OUTPATIENT
Start: 2021-01-01 | End: 2021-01-01

## 2021-01-01 RX ORDER — SODIUM PHOSPHATE,MONO-DIBASIC 19G-7G/118
1 ENEMA (ML) RECTAL ONCE AS NEEDED
Status: CANCELLED | OUTPATIENT
Start: 2021-01-01

## 2021-01-01 RX ORDER — GLYCOPYRROLATE 0.2 MG/ML
0.1 INJECTION INTRAMUSCULAR; INTRAVENOUS EVERY 4 HOURS PRN
Status: DISCONTINUED | OUTPATIENT
Start: 2021-01-01 | End: 2021-01-01

## 2021-01-01 RX ORDER — LANSOPRAZOLE
15 KIT
Status: CANCELLED | OUTPATIENT
Start: 2021-01-01

## 2021-01-01 RX ORDER — ACETAZOLAMIDE SODIUM 500 MG/5ML
500 INJECTION, POWDER, LYOPHILIZED, FOR SOLUTION INTRAVENOUS ONCE
Status: DISCONTINUED | OUTPATIENT
Start: 2021-01-01 | End: 2021-01-01 | Stop reason: HOSPADM

## 2021-01-01 RX ORDER — ACETAMINOPHEN 650 MG/1
650 SUPPOSITORY RECTAL EVERY 4 HOURS PRN
Status: DISCONTINUED | OUTPATIENT
Start: 2021-01-01 | End: 2021-01-01 | Stop reason: HOSPADM

## 2021-01-01 RX ORDER — CLOPIDOGREL BISULFATE 75 MG/1
75 TABLET ORAL DAILY
Status: CANCELLED | OUTPATIENT
Start: 2021-01-01

## 2021-01-01 RX ORDER — ASPIRIN 81 MG/1
81 TABLET, CHEWABLE ORAL DAILY
Status: CANCELLED | OUTPATIENT
Start: 2021-01-01

## 2021-01-01 RX ORDER — LORAZEPAM 2 MG/ML
1 INJECTION INTRAMUSCULAR EVERY 6 HOURS
Status: DISCONTINUED | OUTPATIENT
Start: 2021-01-01 | End: 2021-01-01 | Stop reason: HOSPADM

## 2021-01-01 RX ORDER — NALOXONE HCL 0.4 MG/ML
0.2 VIAL (ML) INJECTION
Status: CANCELLED | OUTPATIENT
Start: 2021-01-01

## 2021-01-01 RX ORDER — GLYCOPYRROLATE 0.2 MG/ML
0.1 INJECTION INTRAMUSCULAR; INTRAVENOUS EVERY 4 HOURS PRN
Status: DISCONTINUED | OUTPATIENT
Start: 2021-01-01 | End: 2021-01-01 | Stop reason: HOSPADM

## 2021-01-01 RX ORDER — IPRATROPIUM BROMIDE AND ALBUTEROL SULFATE 2.5; .5 MG/3ML; MG/3ML
3 SOLUTION RESPIRATORY (INHALATION)
Status: CANCELLED | OUTPATIENT
Start: 2021-01-01

## 2021-01-01 RX ORDER — LEVOFLOXACIN 5 MG/ML
750 INJECTION, SOLUTION INTRAVENOUS ONCE
Status: DISCONTINUED | OUTPATIENT
Start: 2021-01-01 | End: 2021-01-01

## 2021-01-01 RX ORDER — VANCOMYCIN HYDROCHLORIDE 1 G/200ML
1000 INJECTION, SOLUTION INTRAVENOUS EVERY 12 HOURS
Status: DISCONTINUED | OUTPATIENT
Start: 2021-01-01 | End: 2021-01-01

## 2021-01-01 RX ORDER — POLYETHYLENE GLYCOL 3350 17 G/17G
17 POWDER, FOR SOLUTION ORAL DAILY PRN
Status: CANCELLED | OUTPATIENT
Start: 2021-01-01

## 2021-01-01 RX ORDER — LOSARTAN POTASSIUM 50 MG/1
50 TABLET ORAL
Status: DISCONTINUED | OUTPATIENT
Start: 2021-01-01 | End: 2021-01-01

## 2021-01-01 RX ORDER — MORPHINE SULFATE 4 MG/ML
4 INJECTION, SOLUTION INTRAMUSCULAR; INTRAVENOUS
Status: DISCONTINUED | OUTPATIENT
Start: 2021-01-01 | End: 2021-01-01 | Stop reason: HOSPADM

## 2021-01-01 RX ORDER — SCOLOPAMINE TRANSDERMAL SYSTEM 1 MG/1
1 PATCH, EXTENDED RELEASE TRANSDERMAL
Status: DISCONTINUED | OUTPATIENT
Start: 2021-01-01 | End: 2021-01-01 | Stop reason: HOSPADM

## 2021-01-01 RX ORDER — LORAZEPAM 2 MG/ML
2 INJECTION INTRAMUSCULAR
Status: DISCONTINUED | OUTPATIENT
Start: 2021-01-01 | End: 2021-01-01 | Stop reason: HOSPADM

## 2021-01-01 RX ORDER — FUROSEMIDE 10 MG/ML
20 INJECTION INTRAMUSCULAR; INTRAVENOUS EVERY 6 HOURS
Status: DISCONTINUED | OUTPATIENT
Start: 2021-01-01 | End: 2021-01-01 | Stop reason: HOSPADM

## 2021-01-01 RX ORDER — MORPHINE SULFATE 2 MG/ML
2 INJECTION, SOLUTION INTRAMUSCULAR; INTRAVENOUS EVERY 4 HOURS PRN
Status: DISCONTINUED | OUTPATIENT
Start: 2021-01-01 | End: 2021-01-01

## 2021-01-01 RX ORDER — BISACODYL 10 MG
10 SUPPOSITORY, RECTAL RECTAL ONCE
Status: COMPLETED | OUTPATIENT
Start: 2021-01-01 | End: 2021-01-01

## 2021-01-01 RX ORDER — OXYCODONE HYDROCHLORIDE AND ACETAMINOPHEN 5; 325 MG/1; MG/1
1 TABLET ORAL EVERY 6 HOURS PRN
COMMUNITY

## 2021-01-01 RX ORDER — ONDANSETRON 2 MG/ML
4 INJECTION INTRAMUSCULAR; INTRAVENOUS EVERY 6 HOURS PRN
Status: DISCONTINUED | OUTPATIENT
Start: 2021-01-01 | End: 2021-01-01 | Stop reason: HOSPADM

## 2021-01-01 RX ORDER — OMEPRAZOLE 20 MG/1
20 CAPSULE, DELAYED RELEASE ORAL DAILY
COMMUNITY

## 2021-01-01 RX ORDER — CARVEDILOL 12.5 MG/1
12.5 TABLET ORAL 2 TIMES DAILY WITH MEALS
COMMUNITY

## 2021-01-01 RX ORDER — PRAVASTATIN SODIUM 40 MG
40 TABLET ORAL DAILY
COMMUNITY

## 2021-01-01 RX ORDER — ATORVASTATIN CALCIUM 40 MG/1
80 TABLET, FILM COATED ORAL NIGHTLY
Status: CANCELLED | OUTPATIENT
Start: 2021-01-01

## 2021-01-01 RX ORDER — POLYVINYL ALCOHOL 14 MG/ML
2 SOLUTION/ DROPS OPHTHALMIC
Status: DISCONTINUED | OUTPATIENT
Start: 2021-01-01 | End: 2021-01-01 | Stop reason: HOSPADM

## 2021-01-01 RX ORDER — METOCLOPRAMIDE HYDROCHLORIDE 5 MG/ML
10 INJECTION INTRAMUSCULAR; INTRAVENOUS EVERY 4 HOURS
Status: COMPLETED | OUTPATIENT
Start: 2021-01-01 | End: 2021-01-01

## 2021-01-01 RX ORDER — METRONIDAZOLE 500 MG/1
500 TABLET ORAL EVERY 8 HOURS SCHEDULED
Status: CANCELLED | OUTPATIENT
Start: 2021-01-01 | End: 2021-01-01

## 2021-01-01 RX ORDER — MORPHINE SULFATE 2 MG/ML
2 INJECTION, SOLUTION INTRAMUSCULAR; INTRAVENOUS EVERY 4 HOURS PRN
Status: DISCONTINUED | OUTPATIENT
Start: 2021-01-01 | End: 2021-01-01 | Stop reason: HOSPADM

## 2021-01-01 RX ORDER — KETOROLAC TROMETHAMINE 15 MG/ML
15 INJECTION, SOLUTION INTRAMUSCULAR; INTRAVENOUS EVERY 6 HOURS PRN
Status: DISPENSED | OUTPATIENT
Start: 2021-01-01 | End: 2021-01-01

## 2021-01-01 RX ORDER — CARVEDILOL 12.5 MG/1
12.5 TABLET ORAL 2 TIMES DAILY WITH MEALS
Status: DISCONTINUED | OUTPATIENT
Start: 2021-01-01 | End: 2021-01-01

## 2021-01-01 RX ORDER — ESCITALOPRAM OXALATE 10 MG/1
10 TABLET ORAL NIGHTLY
Status: CANCELLED | OUTPATIENT
Start: 2021-01-01

## 2021-01-01 RX ORDER — LOSARTAN POTASSIUM 50 MG/1
50 TABLET ORAL DAILY
COMMUNITY

## 2021-01-01 RX ORDER — ACETAMINOPHEN 325 MG/1
650 TABLET ORAL EVERY 4 HOURS PRN
Status: DISCONTINUED | OUTPATIENT
Start: 2021-01-01 | End: 2021-01-01 | Stop reason: HOSPADM

## 2021-01-01 RX ORDER — LABETALOL HYDROCHLORIDE 5 MG/ML
20 INJECTION, SOLUTION INTRAVENOUS
Status: CANCELLED | OUTPATIENT
Start: 2021-01-01

## 2021-01-01 RX ORDER — LORAZEPAM 2 MG/ML
1 INJECTION INTRAMUSCULAR
Status: DISCONTINUED | OUTPATIENT
Start: 2021-01-01 | End: 2021-01-01 | Stop reason: HOSPADM

## 2021-01-01 RX ORDER — PANTOPRAZOLE SODIUM 40 MG/1
40 TABLET, DELAYED RELEASE ORAL
Status: DISCONTINUED | OUTPATIENT
Start: 2021-01-01 | End: 2021-01-01

## 2021-01-01 RX ORDER — LORAZEPAM 2 MG/ML
0.5 INJECTION INTRAMUSCULAR EVERY 6 HOURS
Status: DISCONTINUED | OUTPATIENT
Start: 2021-01-01 | End: 2021-01-01

## 2021-01-01 RX ORDER — POLYVINYL ALCOHOL 14 MG/ML
2 SOLUTION/ DROPS OPHTHALMIC 2 TIMES DAILY
Status: DISCONTINUED | OUTPATIENT
Start: 2021-01-01 | End: 2021-01-01 | Stop reason: HOSPADM

## 2021-01-01 RX ORDER — SODIUM PHOSPHATE,MONO-DIBASIC 19G-7G/118
1 ENEMA (ML) RECTAL ONCE AS NEEDED
Status: DISCONTINUED | OUTPATIENT
Start: 2021-01-01 | End: 2021-01-01 | Stop reason: HOSPADM

## 2021-01-01 RX ORDER — GLYCOPYRROLATE 0.2 MG/ML
0.1 INJECTION INTRAMUSCULAR; INTRAVENOUS EVERY 4 HOURS PRN
Status: CANCELLED | OUTPATIENT
Start: 2021-01-01

## 2021-01-01 RX ORDER — MORPHINE SULFATE 2 MG/ML
2 INJECTION, SOLUTION INTRAMUSCULAR; INTRAVENOUS EVERY 6 HOURS
Status: DISCONTINUED | OUTPATIENT
Start: 2021-01-01 | End: 2021-01-01

## 2021-01-01 RX ORDER — PHENOBARBITAL SODIUM 65 MG/ML
65 INJECTION INTRAMUSCULAR EVERY 6 HOURS PRN
Status: DISCONTINUED | OUTPATIENT
Start: 2021-01-01 | End: 2021-01-01 | Stop reason: HOSPADM

## 2021-01-01 RX ORDER — HALOPERIDOL 5 MG/ML
1 INJECTION INTRAMUSCULAR ONCE
Status: COMPLETED | OUTPATIENT
Start: 2021-01-01 | End: 2021-01-01

## 2021-01-01 RX ORDER — SODIUM CHLORIDE 0.9 % (FLUSH) 0.9 %
10 SYRINGE (ML) INJECTION EVERY 12 HOURS SCHEDULED
Status: CANCELLED | OUTPATIENT
Start: 2021-01-01

## 2021-01-01 RX ORDER — CHOLECALCIFEROL (VITAMIN D3) 125 MCG
5 CAPSULE ORAL NIGHTLY PRN
Status: CANCELLED | OUTPATIENT
Start: 2021-01-01

## 2021-01-01 RX ORDER — SODIUM CHLORIDE 9 MG/ML
75 INJECTION, SOLUTION INTRAVENOUS CONTINUOUS
Status: ACTIVE | OUTPATIENT
Start: 2021-01-01 | End: 2021-01-01

## 2021-01-01 RX ORDER — HEPARIN SODIUM 5000 [USP'U]/ML
5000 INJECTION, SOLUTION INTRAVENOUS; SUBCUTANEOUS EVERY 12 HOURS SCHEDULED
Status: DISCONTINUED | OUTPATIENT
Start: 2021-01-01 | End: 2021-01-01

## 2021-01-01 RX ORDER — OXYCODONE HYDROCHLORIDE AND ACETAMINOPHEN 5; 325 MG/1; MG/1
1 TABLET ORAL EVERY 6 HOURS PRN
Status: CANCELLED | OUTPATIENT
Start: 2021-01-01

## 2021-01-01 RX ORDER — BISACODYL 10 MG
10 SUPPOSITORY, RECTAL RECTAL DAILY PRN
Status: DISCONTINUED | OUTPATIENT
Start: 2021-01-01 | End: 2021-01-01 | Stop reason: HOSPADM

## 2021-01-01 RX ORDER — LORAZEPAM 2 MG/ML
2 INJECTION INTRAMUSCULAR EVERY 4 HOURS PRN
Status: CANCELLED | OUTPATIENT
Start: 2021-01-01 | End: 2021-01-01

## 2021-01-01 RX ORDER — DEXTROSE MONOHYDRATE 25 G/50ML
25 INJECTION, SOLUTION INTRAVENOUS
Status: CANCELLED | OUTPATIENT
Start: 2021-01-01

## 2021-01-01 RX ORDER — FLUMAZENIL 0.1 MG/ML
0.1 INJECTION INTRAVENOUS ONCE
Status: COMPLETED | OUTPATIENT
Start: 2021-01-01 | End: 2021-01-01

## 2021-01-01 RX ORDER — IPRATROPIUM BROMIDE AND ALBUTEROL SULFATE 2.5; .5 MG/3ML; MG/3ML
3 SOLUTION RESPIRATORY (INHALATION)
Status: DISCONTINUED | OUTPATIENT
Start: 2021-01-01 | End: 2021-01-01 | Stop reason: HOSPADM

## 2021-01-01 RX ORDER — NICOTINE POLACRILEX 4 MG
15 LOZENGE BUCCAL
Status: DISCONTINUED | OUTPATIENT
Start: 2021-01-01 | End: 2021-01-01 | Stop reason: HOSPADM

## 2021-01-01 RX ORDER — CLOPIDOGREL BISULFATE 75 MG/1
75 TABLET ORAL DAILY
COMMUNITY

## 2021-01-01 RX ORDER — NICOTINE POLACRILEX 4 MG
15 LOZENGE BUCCAL
Status: CANCELLED | OUTPATIENT
Start: 2021-01-01

## 2021-01-01 RX ORDER — HEPARIN SODIUM 5000 [USP'U]/ML
5000 INJECTION, SOLUTION INTRAVENOUS; SUBCUTANEOUS EVERY 8 HOURS SCHEDULED
Status: CANCELLED | OUTPATIENT
Start: 2021-01-01

## 2021-01-01 RX ORDER — FUROSEMIDE 10 MG/ML
40 INJECTION INTRAMUSCULAR; INTRAVENOUS EVERY 6 HOURS PRN
Status: DISCONTINUED | OUTPATIENT
Start: 2021-01-01 | End: 2021-01-01 | Stop reason: HOSPADM

## 2021-01-01 RX ORDER — GABAPENTIN 400 MG/1
400 CAPSULE ORAL 3 TIMES DAILY
COMMUNITY

## 2021-01-01 RX ORDER — ACETAMINOPHEN 325 MG/1
650 TABLET ORAL EVERY 4 HOURS PRN
Status: CANCELLED | OUTPATIENT
Start: 2021-01-01

## 2021-01-01 RX ORDER — DEXTROSE MONOHYDRATE 25 G/50ML
25 INJECTION, SOLUTION INTRAVENOUS
Status: DISCONTINUED | OUTPATIENT
Start: 2021-01-01 | End: 2021-01-01 | Stop reason: HOSPADM

## 2021-01-01 RX ORDER — IPRATROPIUM BROMIDE AND ALBUTEROL SULFATE 2.5; .5 MG/3ML; MG/3ML
3 SOLUTION RESPIRATORY (INHALATION) EVERY 4 HOURS PRN
Status: CANCELLED | OUTPATIENT
Start: 2021-01-01

## 2021-01-01 RX ORDER — DOCUSATE SODIUM 50 MG/5 ML
100 LIQUID (ML) ORAL 2 TIMES DAILY
Status: DISCONTINUED | OUTPATIENT
Start: 2021-01-01 | End: 2021-01-01 | Stop reason: HOSPADM

## 2021-01-01 RX ORDER — CLOPIDOGREL BISULFATE 75 MG/1
75 TABLET ORAL DAILY
Status: DISCONTINUED | OUTPATIENT
Start: 2021-01-01 | End: 2021-01-01 | Stop reason: HOSPADM

## 2021-01-01 RX ORDER — LANSOPRAZOLE
15 KIT
Status: DISCONTINUED | OUTPATIENT
Start: 2021-01-01 | End: 2021-01-01 | Stop reason: HOSPADM

## 2021-01-01 RX ORDER — KETOROLAC TROMETHAMINE 15 MG/ML
15 INJECTION, SOLUTION INTRAMUSCULAR; INTRAVENOUS EVERY 6 HOURS
Status: DISCONTINUED | OUTPATIENT
Start: 2021-01-01 | End: 2021-01-01 | Stop reason: HOSPADM

## 2021-01-01 RX ORDER — ESCITALOPRAM OXALATE 10 MG/1
10 TABLET ORAL NIGHTLY
Status: DISCONTINUED | OUTPATIENT
Start: 2021-01-01 | End: 2021-01-01 | Stop reason: HOSPADM

## 2021-01-01 RX ORDER — HALOPERIDOL 5 MG/ML
2 INJECTION INTRAMUSCULAR EVERY 4 HOURS PRN
Status: DISCONTINUED | OUTPATIENT
Start: 2021-01-01 | End: 2021-01-01 | Stop reason: HOSPADM

## 2021-01-01 RX ORDER — SODIUM CHLORIDE 0.9 % (FLUSH) 0.9 %
10 SYRINGE (ML) INJECTION EVERY 12 HOURS SCHEDULED
Status: DISCONTINUED | OUTPATIENT
Start: 2021-01-01 | End: 2021-01-01 | Stop reason: HOSPADM

## 2021-01-01 RX ORDER — POLYETHYLENE GLYCOL 3350 17 G/17G
17 POWDER, FOR SOLUTION ORAL DAILY PRN
Status: DISCONTINUED | OUTPATIENT
Start: 2021-01-01 | End: 2021-01-01 | Stop reason: HOSPADM

## 2021-01-01 RX ORDER — FUROSEMIDE 10 MG/ML
20 INJECTION INTRAMUSCULAR; INTRAVENOUS EVERY 6 HOURS PRN
Status: DISCONTINUED | OUTPATIENT
Start: 2021-01-01 | End: 2021-01-01

## 2021-01-01 RX ORDER — MORPHINE SULFATE 4 MG/ML
4 INJECTION, SOLUTION INTRAMUSCULAR; INTRAVENOUS EVERY 6 HOURS
Status: DISCONTINUED | OUTPATIENT
Start: 2021-01-01 | End: 2021-01-01 | Stop reason: HOSPADM

## 2021-01-01 RX ORDER — GABAPENTIN 400 MG/1
400 CAPSULE ORAL 3 TIMES DAILY
Status: DISCONTINUED | OUTPATIENT
Start: 2021-01-01 | End: 2021-01-01

## 2021-01-01 RX ORDER — METOCLOPRAMIDE HYDROCHLORIDE 5 MG/ML
10 INJECTION INTRAMUSCULAR; INTRAVENOUS ONCE
Status: COMPLETED | OUTPATIENT
Start: 2021-01-01 | End: 2021-01-01

## 2021-01-01 RX ORDER — HALOPERIDOL 5 MG/ML
0.5 INJECTION INTRAMUSCULAR ONCE
Status: COMPLETED | OUTPATIENT
Start: 2021-01-01 | End: 2021-01-01

## 2021-01-01 RX ORDER — OXYCODONE HYDROCHLORIDE AND ACETAMINOPHEN 5; 325 MG/1; MG/1
1 TABLET ORAL EVERY 6 HOURS PRN
Status: DISCONTINUED | OUTPATIENT
Start: 2021-01-01 | End: 2021-01-01 | Stop reason: HOSPADM

## 2021-01-01 RX ORDER — METRONIDAZOLE 500 MG/1
500 TABLET ORAL EVERY 8 HOURS SCHEDULED
Status: DISCONTINUED | OUTPATIENT
Start: 2021-01-01 | End: 2021-01-01 | Stop reason: HOSPADM

## 2021-01-01 RX ORDER — ONDANSETRON 2 MG/ML
4 INJECTION INTRAMUSCULAR; INTRAVENOUS EVERY 6 HOURS PRN
Status: CANCELLED | OUTPATIENT
Start: 2021-01-01

## 2021-01-01 RX ORDER — IPRATROPIUM BROMIDE AND ALBUTEROL SULFATE 2.5; .5 MG/3ML; MG/3ML
3 SOLUTION RESPIRATORY (INHALATION) EVERY 4 HOURS PRN
Status: DISCONTINUED | OUTPATIENT
Start: 2021-01-01 | End: 2021-01-01 | Stop reason: HOSPADM

## 2021-01-01 RX ORDER — CHOLECALCIFEROL (VITAMIN D3) 125 MCG
5 CAPSULE ORAL NIGHTLY PRN
Status: DISCONTINUED | OUTPATIENT
Start: 2021-01-01 | End: 2021-01-01 | Stop reason: HOSPADM

## 2021-01-01 RX ORDER — LOSARTAN POTASSIUM 50 MG/1
100 TABLET ORAL
Status: DISCONTINUED | OUTPATIENT
Start: 2021-01-01 | End: 2021-01-01

## 2021-01-01 RX ORDER — ASPIRIN 81 MG/1
81 TABLET, CHEWABLE ORAL DAILY
COMMUNITY

## 2021-01-01 RX ORDER — NALOXONE HCL 0.4 MG/ML
0.2 VIAL (ML) INJECTION
Status: DISCONTINUED | OUTPATIENT
Start: 2021-01-01 | End: 2021-01-01 | Stop reason: HOSPADM

## 2021-01-01 RX ORDER — GLIPIZIDE 10 MG/1
10 TABLET ORAL
COMMUNITY

## 2021-01-01 RX ORDER — ATORVASTATIN CALCIUM 40 MG/1
80 TABLET, FILM COATED ORAL NIGHTLY
Status: DISCONTINUED | OUTPATIENT
Start: 2021-01-01 | End: 2021-01-01 | Stop reason: HOSPADM

## 2021-01-01 RX ORDER — ONDANSETRON 2 MG/ML
4 INJECTION INTRAMUSCULAR; INTRAVENOUS EVERY 6 HOURS PRN
Status: DISCONTINUED | OUTPATIENT
Start: 2021-01-01 | End: 2021-01-01

## 2021-01-01 RX ORDER — ACETAMINOPHEN 650 MG/1
650 SUPPOSITORY RECTAL EVERY 4 HOURS PRN
Status: CANCELLED | OUTPATIENT
Start: 2021-01-01

## 2021-01-01 RX ADMIN — LORAZEPAM 2 MG: 2 INJECTION INTRAMUSCULAR; INTRAVENOUS at 21:21

## 2021-01-01 RX ADMIN — INSULIN HUMAN 3 UNITS: 100 INJECTION, SOLUTION PARENTERAL at 12:38

## 2021-01-01 RX ADMIN — MINERAL OIL: 1000 LIQUID ORAL at 09:59

## 2021-01-01 RX ADMIN — OXYCODONE HYDROCHLORIDE AND ACETAMINOPHEN 1 TABLET: 5; 325 TABLET ORAL at 09:39

## 2021-01-01 RX ADMIN — FUROSEMIDE 20 MG: 10 INJECTION, SOLUTION INTRAMUSCULAR; INTRAVENOUS at 10:35

## 2021-01-01 RX ADMIN — MINERAL OIL: 1000 LIQUID ORAL at 07:34

## 2021-01-01 RX ADMIN — HEPARIN SODIUM 5000 UNITS: 5000 INJECTION INTRAVENOUS; SUBCUTANEOUS at 23:05

## 2021-01-01 RX ADMIN — MINERAL OIL: 1000 LIQUID ORAL at 21:37

## 2021-01-01 RX ADMIN — IPRATROPIUM BROMIDE AND ALBUTEROL SULFATE 3 ML: 2.5; .5 SOLUTION RESPIRATORY (INHALATION) at 08:02

## 2021-01-01 RX ADMIN — METRONIDAZOLE 500 MG: 500 TABLET ORAL at 05:52

## 2021-01-01 RX ADMIN — DOCUSATE SODIUM 100 MG: 50 LIQUID ORAL at 00:17

## 2021-01-01 RX ADMIN — GLYCOPYRROLATE 0.1 MG: 0.2 INJECTION, SOLUTION INTRAMUSCULAR; INTRAVENOUS at 06:35

## 2021-01-01 RX ADMIN — MEROPENEM 1 G: 1 INJECTION, POWDER, FOR SOLUTION INTRAVENOUS at 15:04

## 2021-01-01 RX ADMIN — MORPHINE SULFATE 4 MG: 4 INJECTION, SOLUTION INTRAMUSCULAR; INTRAVENOUS at 15:33

## 2021-01-01 RX ADMIN — MEROPENEM 1 G: 1 INJECTION, POWDER, FOR SOLUTION INTRAVENOUS at 08:22

## 2021-01-01 RX ADMIN — MORPHINE SULFATE 4 MG: 4 INJECTION, SOLUTION INTRAMUSCULAR; INTRAVENOUS at 15:15

## 2021-01-01 RX ADMIN — MINERAL OIL: 1000 LIQUID ORAL at 15:33

## 2021-01-01 RX ADMIN — METOCLOPRAMIDE 10 MG: 5 INJECTION, SOLUTION INTRAMUSCULAR; INTRAVENOUS at 05:04

## 2021-01-01 RX ADMIN — CARVEDILOL 12.5 MG: 12.5 TABLET, FILM COATED ORAL at 09:12

## 2021-01-01 RX ADMIN — HEPARIN SODIUM 5000 UNITS: 5000 INJECTION INTRAVENOUS; SUBCUTANEOUS at 09:10

## 2021-01-01 RX ADMIN — IPRATROPIUM BROMIDE AND ALBUTEROL SULFATE 3 ML: 2.5; .5 SOLUTION RESPIRATORY (INHALATION) at 07:04

## 2021-01-01 RX ADMIN — IPRATROPIUM BROMIDE AND ALBUTEROL SULFATE 3 ML: 2.5; .5 SOLUTION RESPIRATORY (INHALATION) at 19:57

## 2021-01-01 RX ADMIN — MORPHINE SULFATE 4 MG: 4 INJECTION, SOLUTION INTRAMUSCULAR; INTRAVENOUS at 09:13

## 2021-01-01 RX ADMIN — METRONIDAZOLE 500 MG: 500 TABLET ORAL at 05:29

## 2021-01-01 RX ADMIN — METOCLOPRAMIDE 10 MG: 5 INJECTION, SOLUTION INTRAMUSCULAR; INTRAVENOUS at 21:32

## 2021-01-01 RX ADMIN — IOPAMIDOL 40 ML: 755 INJECTION, SOLUTION INTRAVENOUS at 01:30

## 2021-01-01 RX ADMIN — LORAZEPAM 1 MG: 2 INJECTION INTRAMUSCULAR; INTRAVENOUS at 03:54

## 2021-01-01 RX ADMIN — HEPARIN SODIUM 5000 UNITS: 5000 INJECTION INTRAVENOUS; SUBCUTANEOUS at 08:55

## 2021-01-01 RX ADMIN — DOCUSATE SODIUM 100 MG: 50 LIQUID ORAL at 09:12

## 2021-01-01 RX ADMIN — MORPHINE SULFATE 4 MG: 4 INJECTION, SOLUTION INTRAMUSCULAR; INTRAVENOUS at 07:59

## 2021-01-01 RX ADMIN — HEPARIN SODIUM 5000 UNITS: 5000 INJECTION INTRAVENOUS; SUBCUTANEOUS at 03:01

## 2021-01-01 RX ADMIN — HEPARIN SODIUM 5000 UNITS: 5000 INJECTION INTRAVENOUS; SUBCUTANEOUS at 15:07

## 2021-01-01 RX ADMIN — ATORVASTATIN CALCIUM 80 MG: 40 TABLET, FILM COATED ORAL at 00:17

## 2021-01-01 RX ADMIN — GUAIFENESIN 200 MG: 100 SOLUTION ORAL at 15:07

## 2021-01-01 RX ADMIN — GUAIFENESIN 200 MG: 100 SOLUTION ORAL at 18:22

## 2021-01-01 RX ADMIN — SENNOSIDES 10 ML: 8.8 LIQUID ORAL at 21:52

## 2021-01-01 RX ADMIN — IPRATROPIUM BROMIDE AND ALBUTEROL SULFATE 3 ML: 2.5; .5 SOLUTION RESPIRATORY (INHALATION) at 15:32

## 2021-01-01 RX ADMIN — FUROSEMIDE 20 MG: 10 INJECTION, SOLUTION INTRAMUSCULAR; INTRAVENOUS at 16:57

## 2021-01-01 RX ADMIN — INSULIN HUMAN 6 UNITS: 100 INJECTION, SOLUTION PARENTERAL at 12:37

## 2021-01-01 RX ADMIN — LORAZEPAM 1 MG: 2 INJECTION INTRAMUSCULAR; INTRAVENOUS at 09:13

## 2021-01-01 RX ADMIN — MORPHINE SULFATE 4 MG: 4 INJECTION, SOLUTION INTRAMUSCULAR; INTRAVENOUS at 04:04

## 2021-01-01 RX ADMIN — POLYVINYL ALCOHOL 2 DROP: 14 SOLUTION/ DROPS OPHTHALMIC at 09:14

## 2021-01-01 RX ADMIN — FUROSEMIDE 20 MG: 10 INJECTION, SOLUTION INTRAMUSCULAR; INTRAVENOUS at 21:37

## 2021-01-01 RX ADMIN — HEPARIN SODIUM 5000 UNITS: 5000 INJECTION INTRAVENOUS; SUBCUTANEOUS at 09:38

## 2021-01-01 RX ADMIN — HALOPERIDOL LACTATE 1 MG: 5 INJECTION, SOLUTION INTRAMUSCULAR at 16:52

## 2021-01-01 RX ADMIN — INSULIN HUMAN 4 UNITS: 100 INJECTION, SOLUTION PARENTERAL at 15:10

## 2021-01-01 RX ADMIN — MINERAL OIL: 1000 LIQUID ORAL at 15:56

## 2021-01-01 RX ADMIN — MORPHINE SULFATE 4 MG: 4 INJECTION, SOLUTION INTRAMUSCULAR; INTRAVENOUS at 16:57

## 2021-01-01 RX ADMIN — MORPHINE SULFATE 2 MG: 2 INJECTION, SOLUTION INTRAMUSCULAR; INTRAVENOUS at 22:00

## 2021-01-01 RX ADMIN — GLYCOPYRROLATE 0.4 MG: 0.2 INJECTION INTRAMUSCULAR; INTRAVENOUS at 01:09

## 2021-01-01 RX ADMIN — HEPARIN SODIUM 5000 UNITS: 5000 INJECTION INTRAVENOUS; SUBCUTANEOUS at 22:12

## 2021-01-01 RX ADMIN — METRONIDAZOLE 500 MG: 500 TABLET ORAL at 15:07

## 2021-01-01 RX ADMIN — KETOROLAC TROMETHAMINE 15 MG: 15 INJECTION, SOLUTION INTRAMUSCULAR; INTRAVENOUS at 15:15

## 2021-01-01 RX ADMIN — MORPHINE SULFATE 4 MG: 4 INJECTION, SOLUTION INTRAMUSCULAR; INTRAVENOUS at 21:50

## 2021-01-01 RX ADMIN — LORAZEPAM 2 MG: 2 INJECTION INTRAMUSCULAR; INTRAVENOUS at 23:52

## 2021-01-01 RX ADMIN — SODIUM CHLORIDE, PRESERVATIVE FREE 10 ML: 5 INJECTION INTRAVENOUS at 03:46

## 2021-01-01 RX ADMIN — LANSOPRAZOLE 15 MG: KIT at 05:53

## 2021-01-01 RX ADMIN — SODIUM CHLORIDE, PRESERVATIVE FREE 10 ML: 5 INJECTION INTRAVENOUS at 12:27

## 2021-01-01 RX ADMIN — SODIUM CHLORIDE 75 ML/HR: 9 INJECTION, SOLUTION INTRAVENOUS at 02:23

## 2021-01-01 RX ADMIN — GUAIFENESIN 200 MG: 100 SOLUTION ORAL at 09:59

## 2021-01-01 RX ADMIN — KETOROLAC TROMETHAMINE 15 MG: 15 INJECTION, SOLUTION INTRAMUSCULAR; INTRAVENOUS at 03:43

## 2021-01-01 RX ADMIN — METRONIDAZOLE 500 MG: 500 TABLET ORAL at 14:51

## 2021-01-01 RX ADMIN — MORPHINE SULFATE 4 MG: 4 INJECTION, SOLUTION INTRAMUSCULAR; INTRAVENOUS at 10:02

## 2021-01-01 RX ADMIN — CEFEPIME 2 G: 2 INJECTION, POWDER, FOR SOLUTION INTRAVENOUS at 21:55

## 2021-01-01 RX ADMIN — FUROSEMIDE 20 MG: 10 INJECTION, SOLUTION INTRAMUSCULAR; INTRAVENOUS at 21:57

## 2021-01-01 RX ADMIN — FLUMAZENIL 0.1 MG: 0.1 INJECTION, SOLUTION INTRAVENOUS at 22:11

## 2021-01-01 RX ADMIN — LORAZEPAM 1 MG: 2 INJECTION INTRAMUSCULAR; INTRAVENOUS at 21:50

## 2021-01-01 RX ADMIN — NALXONE HYDROCHLORIDE 0.2 MG: 0.4 INJECTION INTRAMUSCULAR; INTRAVENOUS; SUBCUTANEOUS at 20:25

## 2021-01-01 RX ADMIN — GLYCOPYRROLATE 0.4 MG: 0.2 INJECTION INTRAMUSCULAR; INTRAVENOUS at 10:30

## 2021-01-01 RX ADMIN — HEPARIN SODIUM 5000 UNITS: 5000 INJECTION INTRAVENOUS; SUBCUTANEOUS at 22:18

## 2021-01-01 RX ADMIN — ASPIRIN 81 MG: 81 TABLET, CHEWABLE ORAL at 09:59

## 2021-01-01 RX ADMIN — LORAZEPAM 1 MG: 2 INJECTION INTRAMUSCULAR; INTRAVENOUS at 10:01

## 2021-01-01 RX ADMIN — SODIUM CHLORIDE, PRESERVATIVE FREE 10 ML: 5 INJECTION INTRAVENOUS at 08:55

## 2021-01-01 RX ADMIN — IPRATROPIUM BROMIDE AND ALBUTEROL SULFATE 3 ML: 2.5; .5 SOLUTION RESPIRATORY (INHALATION) at 10:33

## 2021-01-01 RX ADMIN — SODIUM CHLORIDE, PRESERVATIVE FREE 10 ML: 5 INJECTION INTRAVENOUS at 23:29

## 2021-01-01 RX ADMIN — LORAZEPAM 1 MG: 2 INJECTION INTRAMUSCULAR; INTRAVENOUS at 09:43

## 2021-01-01 RX ADMIN — VANCOMYCIN HYDROCHLORIDE 2250 MG: 100 INJECTION, POWDER, LYOPHILIZED, FOR SOLUTION INTRAVENOUS at 02:23

## 2021-01-01 RX ADMIN — POLYVINYL ALCOHOL 2 DROP: 14 SOLUTION/ DROPS OPHTHALMIC at 21:56

## 2021-01-01 RX ADMIN — IPRATROPIUM BROMIDE AND ALBUTEROL SULFATE 3 ML: 2.5; .5 SOLUTION RESPIRATORY (INHALATION) at 17:18

## 2021-01-01 RX ADMIN — INSULIN HUMAN 3 UNITS: 100 INJECTION, SOLUTION PARENTERAL at 18:36

## 2021-01-01 RX ADMIN — KETOROLAC TROMETHAMINE 15 MG: 15 INJECTION, SOLUTION INTRAMUSCULAR; INTRAVENOUS at 21:57

## 2021-01-01 RX ADMIN — HEPARIN SODIUM 5000 UNITS: 5000 INJECTION INTRAVENOUS; SUBCUTANEOUS at 05:52

## 2021-01-01 RX ADMIN — MORPHINE SULFATE 4 MG: 4 INJECTION, SOLUTION INTRAMUSCULAR; INTRAVENOUS at 07:34

## 2021-01-01 RX ADMIN — FUROSEMIDE 20 MG: 10 INJECTION, SOLUTION INTRAVENOUS at 13:17

## 2021-01-01 RX ADMIN — LANSOPRAZOLE 15 MG: KIT at 14:51

## 2021-01-01 RX ADMIN — OXYCODONE HYDROCHLORIDE AND ACETAMINOPHEN 1 TABLET: 5; 325 TABLET ORAL at 18:22

## 2021-01-01 RX ADMIN — ACETAMINOPHEN 650 MG: 325 TABLET, FILM COATED ORAL at 15:09

## 2021-01-01 RX ADMIN — MORPHINE SULFATE 4 MG: 4 INJECTION, SOLUTION INTRAMUSCULAR; INTRAVENOUS at 10:29

## 2021-01-01 RX ADMIN — GABAPENTIN 400 MG: 400 CAPSULE ORAL at 21:17

## 2021-01-01 RX ADMIN — LORAZEPAM 1 MG: 2 INJECTION INTRAMUSCULAR; INTRAVENOUS at 15:15

## 2021-01-01 RX ADMIN — MINERAL OIL: 1000 LIQUID ORAL at 16:58

## 2021-01-01 RX ADMIN — GLYCOPYRROLATE 0.4 MG: 0.2 INJECTION INTRAMUSCULAR; INTRAVENOUS at 14:11

## 2021-01-01 RX ADMIN — KETOROLAC TROMETHAMINE 15 MG: 15 INJECTION, SOLUTION INTRAMUSCULAR; INTRAVENOUS at 01:09

## 2021-01-01 RX ADMIN — MORPHINE SULFATE 4 MG: 4 INJECTION, SOLUTION INTRAMUSCULAR; INTRAVENOUS at 04:51

## 2021-01-01 RX ADMIN — GLYCOPYRROLATE 0.4 MG: 0.2 INJECTION INTRAMUSCULAR; INTRAVENOUS at 15:13

## 2021-01-01 RX ADMIN — HEPARIN SODIUM 5000 UNITS: 5000 INJECTION INTRAVENOUS; SUBCUTANEOUS at 21:18

## 2021-01-01 RX ADMIN — MORPHINE SULFATE 4 MG: 4 INJECTION, SOLUTION INTRAMUSCULAR; INTRAVENOUS at 03:43

## 2021-01-01 RX ADMIN — FUROSEMIDE 20 MG: 10 INJECTION, SOLUTION INTRAVENOUS at 01:09

## 2021-01-01 RX ADMIN — LORAZEPAM 2 MG: 2 INJECTION INTRAMUSCULAR; INTRAVENOUS at 03:16

## 2021-01-01 RX ADMIN — IOPAMIDOL 85 ML: 755 INJECTION, SOLUTION INTRAVENOUS at 21:00

## 2021-01-01 RX ADMIN — GLYCOPYRROLATE 0.4 MG: 0.2 INJECTION INTRAMUSCULAR; INTRAVENOUS at 16:29

## 2021-01-01 RX ADMIN — MEROPENEM 1 G: 1 INJECTION, POWDER, FOR SOLUTION INTRAVENOUS at 08:55

## 2021-01-01 RX ADMIN — IPRATROPIUM BROMIDE AND ALBUTEROL SULFATE 3 ML: 2.5; .5 SOLUTION RESPIRATORY (INHALATION) at 12:43

## 2021-01-01 RX ADMIN — DOCUSATE SODIUM 100 MG: 50 LIQUID ORAL at 21:17

## 2021-01-01 RX ADMIN — MORPHINE SULFATE 4 MG: 4 INJECTION, SOLUTION INTRAMUSCULAR; INTRAVENOUS at 09:43

## 2021-01-01 RX ADMIN — LORAZEPAM 0.5 MG: 2 INJECTION INTRAMUSCULAR; INTRAVENOUS at 12:54

## 2021-01-01 RX ADMIN — SCOPALAMINE 1 PATCH: 1 PATCH, EXTENDED RELEASE TRANSDERMAL at 15:34

## 2021-01-01 RX ADMIN — POLYVINYL ALCOHOL 2 DROP: 14 SOLUTION/ DROPS OPHTHALMIC at 21:41

## 2021-01-01 RX ADMIN — MINERAL OIL: 1000 LIQUID ORAL at 21:56

## 2021-01-01 RX ADMIN — GUAIFENESIN 200 MG: 100 SOLUTION ORAL at 03:07

## 2021-01-01 RX ADMIN — MORPHINE SULFATE 4 MG: 4 INJECTION, SOLUTION INTRAMUSCULAR; INTRAVENOUS at 15:29

## 2021-01-01 RX ADMIN — MEROPENEM 1 G: 1 INJECTION, POWDER, FOR SOLUTION INTRAVENOUS at 00:15

## 2021-01-01 RX ADMIN — KETOROLAC TROMETHAMINE 15 MG: 15 INJECTION, SOLUTION INTRAMUSCULAR; INTRAVENOUS at 20:02

## 2021-01-01 RX ADMIN — MINERAL OIL: 1000 LIQUID ORAL at 21:41

## 2021-01-01 RX ADMIN — SENNOSIDES 10 ML: 8.8 LIQUID ORAL at 09:10

## 2021-01-01 RX ADMIN — MORPHINE SULFATE 4 MG: 4 INJECTION, SOLUTION INTRAMUSCULAR; INTRAVENOUS at 13:17

## 2021-01-01 RX ADMIN — MEROPENEM 1 G: 1 INJECTION, POWDER, FOR SOLUTION INTRAVENOUS at 14:52

## 2021-01-01 RX ADMIN — DOCUSATE SODIUM 100 MG: 50 LIQUID ORAL at 09:59

## 2021-01-01 RX ADMIN — MEROPENEM 1 G: 1 INJECTION, POWDER, FOR SOLUTION INTRAVENOUS at 22:06

## 2021-01-01 RX ADMIN — INSULIN HUMAN 6 UNITS: 100 INJECTION, SOLUTION PARENTERAL at 12:16

## 2021-01-01 RX ADMIN — MINERAL OIL: 1000 LIQUID ORAL at 21:18

## 2021-01-01 RX ADMIN — GABAPENTIN 400 MG: 400 CAPSULE ORAL at 09:38

## 2021-01-01 RX ADMIN — PHENOBARBITAL SODIUM 65 MG: 65 INJECTION INTRAMUSCULAR at 20:04

## 2021-01-01 RX ADMIN — INSULIN HUMAN 2 UNITS: 100 INJECTION, SOLUTION PARENTERAL at 05:55

## 2021-01-01 RX ADMIN — MORPHINE SULFATE 4 MG: 4 INJECTION, SOLUTION INTRAMUSCULAR; INTRAVENOUS at 21:56

## 2021-01-01 RX ADMIN — GLYCOPYRROLATE 0.4 MG: 0.2 INJECTION INTRAMUSCULAR; INTRAVENOUS at 08:00

## 2021-01-01 RX ADMIN — ATORVASTATIN CALCIUM 80 MG: 40 TABLET, FILM COATED ORAL at 21:17

## 2021-01-01 RX ADMIN — POLYVINYL ALCOHOL 2 DROP: 14 SOLUTION/ DROPS OPHTHALMIC at 21:36

## 2021-01-01 RX ADMIN — LORAZEPAM 1 MG: 2 INJECTION INTRAMUSCULAR; INTRAVENOUS at 15:33

## 2021-01-01 RX ADMIN — LORAZEPAM 1 MG: 2 INJECTION INTRAMUSCULAR; INTRAVENOUS at 04:04

## 2021-01-01 RX ADMIN — LORAZEPAM 1 MG: 2 INJECTION INTRAMUSCULAR; INTRAVENOUS at 14:11

## 2021-01-01 RX ADMIN — LORAZEPAM 0.5 MG: 2 INJECTION INTRAMUSCULAR; INTRAVENOUS at 04:19

## 2021-01-01 RX ADMIN — KETOROLAC TROMETHAMINE 15 MG: 15 INJECTION, SOLUTION INTRAMUSCULAR; INTRAVENOUS at 08:14

## 2021-01-01 RX ADMIN — MINERAL OIL: 1000 LIQUID ORAL at 21:50

## 2021-01-01 RX ADMIN — GUAIFENESIN 200 MG: 100 SOLUTION ORAL at 23:37

## 2021-01-01 RX ADMIN — GUAIFENESIN 200 MG: 100 SOLUTION ORAL at 03:16

## 2021-01-01 RX ADMIN — HALOPERIDOL LACTATE 2 MG: 5 INJECTION, SOLUTION INTRAMUSCULAR at 08:09

## 2021-01-01 RX ADMIN — POLYVINYL ALCOHOL 2 DROP: 14 SOLUTION/ DROPS OPHTHALMIC at 10:30

## 2021-01-01 RX ADMIN — LORAZEPAM 1 MG: 2 INJECTION INTRAMUSCULAR; INTRAVENOUS at 04:50

## 2021-01-01 RX ADMIN — MORPHINE SULFATE 2 MG: 2 INJECTION, SOLUTION INTRAMUSCULAR; INTRAVENOUS at 16:16

## 2021-01-01 RX ADMIN — MINERAL OIL: 1000 LIQUID ORAL at 10:00

## 2021-01-01 RX ADMIN — POLYVINYL ALCOHOL 2 DROP: 14 SOLUTION/ DROPS OPHTHALMIC at 10:02

## 2021-01-01 RX ADMIN — SCOPALAMINE 1 PATCH: 1 PATCH, EXTENDED RELEASE TRANSDERMAL at 15:33

## 2021-01-01 RX ADMIN — HALOPERIDOL LACTATE 0.5 MG: 5 INJECTION, SOLUTION INTRAMUSCULAR at 05:25

## 2021-01-01 RX ADMIN — FUROSEMIDE 20 MG: 10 INJECTION, SOLUTION INTRAMUSCULAR; INTRAVENOUS at 15:56

## 2021-01-01 RX ADMIN — SENNOSIDES 10 ML: 8.8 LIQUID ORAL at 09:59

## 2021-01-01 RX ADMIN — FUROSEMIDE 20 MG: 10 INJECTION, SOLUTION INTRAMUSCULAR; INTRAVENOUS at 03:43

## 2021-01-01 RX ADMIN — FUROSEMIDE 20 MG: 10 INJECTION, SOLUTION INTRAMUSCULAR; INTRAVENOUS at 09:43

## 2021-01-01 RX ADMIN — METRONIDAZOLE 500 MG: 500 TABLET ORAL at 21:17

## 2021-01-01 RX ADMIN — MORPHINE SULFATE 2 MG: 2 INJECTION, SOLUTION INTRAMUSCULAR; INTRAVENOUS at 10:05

## 2021-01-01 RX ADMIN — LORAZEPAM 1 MG: 2 INJECTION INTRAMUSCULAR; INTRAVENOUS at 16:29

## 2021-01-01 RX ADMIN — GUAIFENESIN 200 MG: 100 SOLUTION ORAL at 05:57

## 2021-01-01 RX ADMIN — ACETAMINOPHEN 650 MG: 650 SUPPOSITORY RECTAL at 21:32

## 2021-01-01 RX ADMIN — CLOPIDOGREL BISULFATE 75 MG: 75 TABLET ORAL at 09:38

## 2021-01-01 RX ADMIN — ESCITALOPRAM OXALATE 10 MG: 10 TABLET ORAL at 21:18

## 2021-01-01 RX ADMIN — MORPHINE SULFATE 4 MG: 4 INJECTION, SOLUTION INTRAMUSCULAR; INTRAVENOUS at 08:09

## 2021-01-01 RX ADMIN — CEFEPIME 2 G: 2 INJECTION, POWDER, FOR SOLUTION INTRAVENOUS at 19:00

## 2021-01-01 RX ADMIN — MORPHINE SULFATE 2 MG: 2 INJECTION, SOLUTION INTRAMUSCULAR; INTRAVENOUS at 03:45

## 2021-01-01 RX ADMIN — LORAZEPAM 2 MG: 2 INJECTION INTRAMUSCULAR; INTRAVENOUS at 15:09

## 2021-01-01 RX ADMIN — SODIUM CHLORIDE, PRESERVATIVE FREE 10 ML: 5 INJECTION INTRAVENOUS at 09:39

## 2021-01-01 RX ADMIN — SODIUM CHLORIDE, PRESERVATIVE FREE 10 ML: 5 INJECTION INTRAVENOUS at 09:59

## 2021-01-01 RX ADMIN — METOCLOPRAMIDE 10 MG: 5 INJECTION, SOLUTION INTRAMUSCULAR; INTRAVENOUS at 01:26

## 2021-01-01 RX ADMIN — MORPHINE SULFATE 4 MG: 4 INJECTION, SOLUTION INTRAMUSCULAR; INTRAVENOUS at 21:37

## 2021-01-01 RX ADMIN — SODIUM CHLORIDE, PRESERVATIVE FREE 10 ML: 5 INJECTION INTRAVENOUS at 03:54

## 2021-01-01 RX ADMIN — SENNOSIDES 10 ML: 8.8 LIQUID ORAL at 09:38

## 2021-01-01 RX ADMIN — FUROSEMIDE 20 MG: 10 INJECTION, SOLUTION INTRAMUSCULAR; INTRAVENOUS at 15:33

## 2021-01-01 RX ADMIN — KETOROLAC TROMETHAMINE 15 MG: 15 INJECTION, SOLUTION INTRAMUSCULAR; INTRAVENOUS at 10:29

## 2021-01-01 RX ADMIN — MEROPENEM 1 G: 1 INJECTION, POWDER, FOR SOLUTION INTRAVENOUS at 06:04

## 2021-01-01 RX ADMIN — FUROSEMIDE 20 MG: 10 INJECTION, SOLUTION INTRAMUSCULAR; INTRAVENOUS at 10:01

## 2021-01-01 RX ADMIN — HALOPERIDOL LACTATE 2 MG: 5 INJECTION, SOLUTION INTRAMUSCULAR at 22:02

## 2021-01-01 RX ADMIN — LANSOPRAZOLE 15 MG: KIT at 05:57

## 2021-01-01 RX ADMIN — MINERAL OIL: 1000 LIQUID ORAL at 09:46

## 2021-01-01 RX ADMIN — SODIUM CHLORIDE, PRESERVATIVE FREE 10 ML: 5 INJECTION INTRAVENOUS at 09:13

## 2021-01-01 RX ADMIN — LORAZEPAM 1 MG: 2 INJECTION INTRAMUSCULAR; INTRAVENOUS at 21:41

## 2021-01-01 RX ADMIN — GLYCOPYRROLATE 0.1 MG: 0.2 INJECTION, SOLUTION INTRAMUSCULAR; INTRAVENOUS at 13:06

## 2021-01-01 RX ADMIN — MORPHINE SULFATE 4 MG: 4 INJECTION, SOLUTION INTRAMUSCULAR; INTRAVENOUS at 15:56

## 2021-01-01 RX ADMIN — Medication 5 MG: at 00:17

## 2021-01-01 RX ADMIN — MORPHINE SULFATE 4 MG: 4 INJECTION, SOLUTION INTRAMUSCULAR; INTRAVENOUS at 12:08

## 2021-01-01 RX ADMIN — KETOROLAC TROMETHAMINE 15 MG: 15 INJECTION, SOLUTION INTRAMUSCULAR; INTRAVENOUS at 10:05

## 2021-01-01 RX ADMIN — CLOPIDOGREL BISULFATE 75 MG: 75 TABLET ORAL at 09:12

## 2021-01-01 RX ADMIN — MINERAL OIL: 1000 LIQUID ORAL at 09:13

## 2021-01-01 RX ADMIN — LORAZEPAM 2 MG: 2 INJECTION INTRAMUSCULAR; INTRAVENOUS at 14:51

## 2021-01-01 RX ADMIN — CLOPIDOGREL BISULFATE 75 MG: 75 TABLET ORAL at 09:59

## 2021-01-01 RX ADMIN — POLYVINYL ALCOHOL 2 DROP: 14 SOLUTION/ DROPS OPHTHALMIC at 21:50

## 2021-01-01 RX ADMIN — CARVEDILOL 12.5 MG: 12.5 TABLET, FILM COATED ORAL at 18:22

## 2021-01-01 RX ADMIN — CEFEPIME 2 G: 2 INJECTION, POWDER, FOR SOLUTION INTRAVENOUS at 03:07

## 2021-01-01 RX ADMIN — LORAZEPAM 1 MG: 2 INJECTION INTRAMUSCULAR; INTRAVENOUS at 08:00

## 2021-01-01 RX ADMIN — DOCUSATE SODIUM 100 MG: 50 LIQUID ORAL at 09:39

## 2021-01-01 RX ADMIN — BISACODYL 10 MG: 10 SUPPOSITORY RECTAL at 21:33

## 2021-01-01 RX ADMIN — GUAIFENESIN 200 MG: 100 SOLUTION ORAL at 23:00

## 2021-01-01 RX ADMIN — LORAZEPAM 1 MG: 2 INJECTION INTRAMUSCULAR; INTRAVENOUS at 10:30

## 2021-01-01 RX ADMIN — INSULIN HUMAN 6 UNITS: 100 INJECTION, SOLUTION PARENTERAL at 05:55

## 2021-01-01 RX ADMIN — GABAPENTIN 400 MG: 400 CAPSULE ORAL at 00:17

## 2021-01-01 RX ADMIN — SODIUM CHLORIDE, PRESERVATIVE FREE 10 ML: 5 INJECTION INTRAVENOUS at 15:16

## 2021-01-01 RX ADMIN — SODIUM CHLORIDE, PRESERVATIVE FREE 10 ML: 5 INJECTION INTRAVENOUS at 21:18

## 2021-01-01 RX ADMIN — MORPHINE SULFATE 4 MG: 4 INJECTION, SOLUTION INTRAMUSCULAR; INTRAVENOUS at 21:41

## 2021-01-01 RX ADMIN — LORAZEPAM 1 MG: 2 INJECTION INTRAMUSCULAR; INTRAVENOUS at 16:57

## 2021-01-01 RX ADMIN — CEFEPIME 2 G: 2 INJECTION, POWDER, FOR SOLUTION INTRAVENOUS at 11:38

## 2021-01-01 RX ADMIN — LORAZEPAM 1 MG: 2 INJECTION INTRAMUSCULAR; INTRAVENOUS at 15:56

## 2021-01-01 RX ADMIN — MEROPENEM 1 G: 1 INJECTION, POWDER, FOR SOLUTION INTRAVENOUS at 23:05

## 2021-01-01 RX ADMIN — GABAPENTIN 400 MG: 400 CAPSULE ORAL at 15:00

## 2021-01-01 RX ADMIN — SODIUM CHLORIDE, PRESERVATIVE FREE 10 ML: 5 INJECTION INTRAVENOUS at 02:24

## 2021-01-01 RX ADMIN — MORPHINE SULFATE 2 MG: 2 INJECTION, SOLUTION INTRAMUSCULAR; INTRAVENOUS at 18:55

## 2021-01-01 RX ADMIN — CARVEDILOL 12.5 MG: 12.5 TABLET, FILM COATED ORAL at 18:37

## 2021-01-01 RX ADMIN — LORAZEPAM 1 MG: 2 INJECTION INTRAMUSCULAR; INTRAVENOUS at 15:29

## 2021-01-01 RX ADMIN — CEFEPIME 2 G: 2 INJECTION, POWDER, FOR SOLUTION INTRAVENOUS at 03:16

## 2021-01-01 RX ADMIN — FUROSEMIDE 20 MG: 10 INJECTION, SOLUTION INTRAMUSCULAR; INTRAVENOUS at 04:50

## 2021-01-01 RX ADMIN — CEFEPIME 2 G: 2 INJECTION, POWDER, FOR SOLUTION INTRAVENOUS at 12:16

## 2021-01-01 RX ADMIN — MINERAL OIL 5 ML: 1000 LIQUID ORAL at 15:13

## 2021-01-01 RX ADMIN — POLYVINYL ALCOHOL 2 DROP: 14 SOLUTION/ DROPS OPHTHALMIC at 10:23

## 2021-01-01 RX ADMIN — INSULIN HUMAN 2 UNITS: 100 INJECTION, SOLUTION PARENTERAL at 18:21

## 2021-01-01 RX ADMIN — ESCITALOPRAM OXALATE 10 MG: 10 TABLET ORAL at 00:16

## 2021-01-01 RX ADMIN — PHENOBARBITAL SODIUM 65 MG: 65 INJECTION INTRAMUSCULAR at 13:17

## 2021-01-01 RX ADMIN — FUROSEMIDE 20 MG: 10 INJECTION, SOLUTION INTRAMUSCULAR; INTRAVENOUS at 21:41

## 2021-01-01 RX ADMIN — GUAIFENESIN 200 MG: 100 SOLUTION ORAL at 12:16

## 2021-01-01 RX ADMIN — FUROSEMIDE 20 MG: 10 INJECTION, SOLUTION INTRAMUSCULAR; INTRAVENOUS at 04:05

## 2021-01-01 RX ADMIN — FUROSEMIDE 20 MG: 10 INJECTION, SOLUTION INTRAMUSCULAR; INTRAVENOUS at 21:50

## 2021-01-01 RX ADMIN — SODIUM CHLORIDE, PRESERVATIVE FREE 10 ML: 5 INJECTION INTRAVENOUS at 23:09

## 2021-01-01 RX ADMIN — LORAZEPAM 1 MG: 2 INJECTION INTRAMUSCULAR; INTRAVENOUS at 21:37

## 2021-01-01 RX ADMIN — GABAPENTIN 400 MG: 400 CAPSULE ORAL at 15:07

## 2021-01-01 RX ADMIN — ASPIRIN 81 MG: 81 TABLET, CHEWABLE ORAL at 09:12

## 2021-01-01 RX ADMIN — LORAZEPAM 0.5 MG: 2 INJECTION INTRAMUSCULAR; INTRAVENOUS at 01:03

## 2021-01-01 RX ADMIN — CARVEDILOL 12.5 MG: 12.5 TABLET, FILM COATED ORAL at 09:38

## 2021-01-01 RX ADMIN — LOSARTAN POTASSIUM 50 MG: 50 TABLET, FILM COATED ORAL at 09:12

## 2021-01-01 RX ADMIN — METRONIDAZOLE 500 MG: 500 TABLET ORAL at 12:16

## 2021-01-01 RX ADMIN — FUROSEMIDE 20 MG: 10 INJECTION, SOLUTION INTRAMUSCULAR; INTRAVENOUS at 15:15

## 2021-01-01 RX ADMIN — FUROSEMIDE 20 MG: 10 INJECTION, SOLUTION INTRAMUSCULAR; INTRAVENOUS at 03:54

## 2021-01-01 RX ADMIN — ACETAMINOPHEN 650 MG: 325 TABLET, FILM COATED ORAL at 09:59

## 2021-01-01 RX ADMIN — GUAIFENESIN 200 MG: 100 SOLUTION ORAL at 12:38

## 2021-01-01 RX ADMIN — FUROSEMIDE 20 MG: 10 INJECTION, SOLUTION INTRAMUSCULAR; INTRAVENOUS at 15:29

## 2021-01-01 RX ADMIN — LABETALOL 20 MG/4 ML (5 MG/ML) INTRAVENOUS SYRINGE 20 MG: at 14:25

## 2021-01-01 RX ADMIN — MORPHINE SULFATE 4 MG: 4 INJECTION, SOLUTION INTRAMUSCULAR; INTRAVENOUS at 03:53

## 2021-01-01 RX ADMIN — IPRATROPIUM BROMIDE AND ALBUTEROL SULFATE 3 ML: 2.5; .5 SOLUTION RESPIRATORY (INHALATION) at 06:51

## 2021-01-01 RX ADMIN — NALXONE HYDROCHLORIDE 2 MG: 0.4 INJECTION INTRAMUSCULAR; INTRAVENOUS; SUBCUTANEOUS at 21:55

## 2021-01-01 RX ADMIN — CEFEPIME 2 G: 2 INJECTION, POWDER, FOR SOLUTION INTRAVENOUS at 12:35

## 2021-01-01 RX ADMIN — MINERAL OIL: 1000 LIQUID ORAL at 15:30

## 2021-01-01 RX ADMIN — LORAZEPAM 1 MG: 2 INJECTION INTRAMUSCULAR; INTRAVENOUS at 21:56

## 2021-01-01 RX ADMIN — IOPAMIDOL 125 ML: 755 INJECTION, SOLUTION INTRAVENOUS at 01:15

## 2021-01-01 RX ADMIN — ASPIRIN 81 MG: 81 TABLET, CHEWABLE ORAL at 09:39

## 2021-01-01 RX ADMIN — LABETALOL 20 MG/4 ML (5 MG/ML) INTRAVENOUS SYRINGE 20 MG: at 11:38

## 2021-01-01 RX ADMIN — HALOPERIDOL LACTATE 2 MG: 5 INJECTION, SOLUTION INTRAMUSCULAR at 07:34

## 2021-01-01 RX ADMIN — INSULIN HUMAN 4 UNITS: 100 INJECTION, SOLUTION PARENTERAL at 05:29

## 2021-01-01 RX ADMIN — INSULIN HUMAN 6 UNITS: 100 INJECTION, SOLUTION PARENTERAL at 18:21

## 2021-01-01 RX ADMIN — LORAZEPAM 1 MG: 2 INJECTION INTRAMUSCULAR; INTRAVENOUS at 03:43

## 2021-01-01 RX ADMIN — HEPARIN SODIUM 5000 UNITS: 5000 INJECTION INTRAVENOUS; SUBCUTANEOUS at 12:27

## 2021-01-01 RX ADMIN — GABAPENTIN 400 MG: 400 CAPSULE ORAL at 09:12

## 2021-01-01 RX ADMIN — MINERAL OIL: 1000 LIQUID ORAL at 16:16

## 2021-01-01 RX ADMIN — FUROSEMIDE 20 MG: 10 INJECTION, SOLUTION INTRAMUSCULAR; INTRAVENOUS at 09:13

## 2021-01-01 RX ADMIN — GLYCOPYRROLATE 0.1 MG: 0.2 INJECTION, SOLUTION INTRAMUSCULAR; INTRAVENOUS at 18:33

## 2021-01-01 RX ADMIN — LORAZEPAM 0.5 MG: 2 INJECTION INTRAMUSCULAR; INTRAVENOUS at 06:42

## 2021-01-01 RX ADMIN — SODIUM CHLORIDE, PRESERVATIVE FREE 10 ML: 5 INJECTION INTRAVENOUS at 21:33

## 2021-01-01 RX ADMIN — SODIUM CHLORIDE, POTASSIUM CHLORIDE, SODIUM LACTATE AND CALCIUM CHLORIDE 1000 ML: 600; 310; 30; 20 INJECTION, SOLUTION INTRAVENOUS at 21:53

## 2021-01-01 RX ADMIN — INSULIN HUMAN 3 UNITS: 100 INJECTION, SOLUTION PARENTERAL at 23:37

## 2021-01-01 RX ADMIN — ACETAMINOPHEN 650 MG: 325 TABLET, FILM COATED ORAL at 09:12

## 2021-01-09 PROBLEM — J18.9 PNEUMONIA: Status: ACTIVE | Noted: 2021-01-01

## 2021-01-09 PROBLEM — K21.9 GERD WITHOUT ESOPHAGITIS: Status: ACTIVE | Noted: 2021-01-01

## 2021-01-09 PROBLEM — A41.9 SEPSIS DUE TO PNEUMONIA (HCC): Status: ACTIVE | Noted: 2021-01-01

## 2021-01-09 PROBLEM — N17.9 AKI (ACUTE KIDNEY INJURY) (HCC): Status: ACTIVE | Noted: 2021-01-01

## 2021-01-09 PROBLEM — Z72.0 TOBACCO ABUSE: Status: ACTIVE | Noted: 2021-01-01

## 2021-01-09 PROBLEM — I25.10 CAD (CORONARY ARTERY DISEASE): Status: ACTIVE | Noted: 2021-01-01

## 2021-01-09 PROBLEM — E78.5 HLD (HYPERLIPIDEMIA): Status: ACTIVE | Noted: 2021-01-01

## 2021-01-09 PROBLEM — J96.01 ACUTE RESPIRATORY FAILURE WITH HYPOXIA (HCC): Status: ACTIVE | Noted: 2021-01-01

## 2021-01-09 PROBLEM — Z86.73 HISTORY OF CVA (CEREBROVASCULAR ACCIDENT): Status: ACTIVE | Noted: 2021-01-01

## 2021-01-09 PROBLEM — E11.9 DM2 (DIABETES MELLITUS, TYPE 2) (HCC): Status: ACTIVE | Noted: 2021-01-01

## 2021-01-09 PROBLEM — E87.6 HYPOKALEMIA: Status: ACTIVE | Noted: 2021-01-01

## 2021-01-09 PROBLEM — I10 ESSENTIAL HYPERTENSION: Status: ACTIVE | Noted: 2021-01-01

## 2021-01-09 PROBLEM — R41.82 AMS (ALTERED MENTAL STATUS): Status: ACTIVE | Noted: 2021-01-01

## 2021-01-09 PROBLEM — N18.9 CKD (CHRONIC KIDNEY DISEASE): Status: ACTIVE | Noted: 2021-01-01

## 2021-01-09 NOTE — ED NOTES
CALLED HOUSE SUPERVISOR AT [facility] 3 TIMES TO OBTAIN MEDICAL RECORDS DUE TO RECORDS DEPT CLOSED ON SATURDAY. NO ANSWER AND LEFT MESSAGES TO RETURN CALL.     Corutney James  01/09/21 [id number]

## 2021-01-10 NOTE — ED NOTES
SPOKE WITH CARDINAL KNIGHT ABOUT ANY RECORDS FROM [facility] FAXING INFORMATION THAT THEY HAVE TO US      Courtney James  01/09/21 [id number]

## 2021-01-10 NOTE — PAYOR COMM NOTE
"Jessica Maciel RN Utilization Review [phone]  Fax # [phone]    Notification of admission. Status is inpatient.       Angel Gutierrez (50 y.o. Male)     Date of Birth Social Security Number Address Home Phone MRN    1970  [address]  [facility] [address] [phone] [id number]    Buddhism Marital Status          None Single       Admission Date Admission Type Admitting Provider Attending Provider Department, Room/Bed    21 Emergency Margaret Dow MD Anciro, Audree, MD [facility] 3F, S320/1    Discharge Date Discharge Disposition Discharge Destination                       Attending Provider: Margaret Dow MD    Allergies: Penicillins    Isolation: None   Infection: None   Code Status: CPR    Ht: 188 cm (74\")   Wt: 108 kg (237 lb 9.6 oz)    Admission Cmt: None   Principal Problem: Acute respiratory failure with hypoxia (CMS/HCC) [J96.01]                 Active Insurance as of 2021     Primary Coverage     Payor Plan Insurance Group Employer/Plan Group    [employer] [place] [employer] [place]ICA[place]      Payor Plan Address Payor Plan Phone Number Payor Plan Fax Number Effective Dates    [address] [address] [phone]  2021 - None Entered    [facility] [id number]       Subscriber Name Subscriber Birth Date Member ID       ANGEL GUTIERREZ 1970 [id number]                 Emergency Contacts      (Rel.) Home Phone Work Phone Mobile Phone    no,contact [phone] -- --               History & Physical      Purvi Cardona MD at 21 2315              [facility]  HISTORY AND PHYSICAL    Patient Name: Angel Gutierrez  : 1970  MRN: [id number]  Primary Care Physician: Provider, No Known  Date of admission: 2021    Ángela Ba PA-C 21 11:15 PM EST     Subjective   Subjective     Chief Complaint:  AMS    HPI:  Angel Gutierrez is a 50 y.o. male with a past medical history significant for " "HLD, HTN, CAD, PVD, DM2, CKD, GERD, s/p splenectomy, and ongoing tobacco abuse. He presents today with acute change in mental status from [facility]. Records reviewed indicate that patient typically responds by tracking his eyes and shaking his head yes or no. Apparently he is less responsive and can longer follow commands. Also noted to be hypoxic with oxygen saturations down to mid 80's on room air. HPI limited secondary to patient disposition. There are no complaints of fever, cough, congestion, or syncope. Patient initially presented to [facility] ED on 1/3/21 after being found crawling on all 4's by family. He was severely aphasic with right facial droop. He has a history of CVA X2, most recently in October 2020, but was otherwise \"normal, independent, and conversant\" a month ago when he was last seen by family. CT imaging of head on 1/3 showed new region of low attenuation within the left frontal periventricular white matter suggesting ischemia of unknown chronicity. UDS was positive for cocaine at that time. He was subsequently admitted for MRI and evaluation by neurology. Patient improved and was discharged to [facility] from where he presents today with a Keofeed and persistent aphasia.       Current COVID Risks are:  [] Fever []  Cough [] Shortness of breath [] Fatigue [] Change in taste or smell    [] Exposure to COVID positive patient  [x] High risk facility   []  NONE    Review of Systems     All other systems reviewed and are negative.     Personal History     Past Medical History:   Diagnosis Date   • CAD (coronary artery disease) 1/9/2021   • CKD (chronic kidney disease) 1/9/2021   • DM2 (diabetes mellitus, type 2) (CMS/[facility]) 1/9/2021   • Essential hypertension 1/9/2021   • GERD without esophagitis 1/9/2021   • History of CVA (cerebrovascular accident) 1/9/2021   • HLD (hyperlipidemia) 1/9/2021       Past Surgical History:   Procedure Laterality Date   • SPLENECTOMY         Family " "History: family history is not on file. Otherwise pertinent FHx was reviewed and unremarkable.     Social History:  reports that he has been smoking cigarettes. He does not have any smokeless tobacco history on file. Alcohol use questions deferred to the physician. He reports current drug use. Drug: \"Crack\" cocaine.  Social History     Social History Narrative   • Not on file       Medications:       Allergies   Allergen Reactions   • Penicillins Other (See Comments)     unknown       Objective   Objective     Vital Signs:   Temp:  [98 °F (36.7 °C)-100.3 °F (37.9 °C)] 100 °F (37.8 °C)  Heart Rate:  [102-120] 102  Resp:  [16-18] 18  BP: (138-183)/() 138/117  Flow (L/min):  [1-2] 2    Physical Exam   Constitutional: Awake, alert, but disoriented and severely aphasic, unable to respond to commands,   Eyes: PERRLA, sclerae anicteric, no conjunctival injection  HENT: NCAT, mucous membranes moist  Neck: Supple, no thyromegaly, no lymphadenopathy, trachea midline  Respiratory:  nonlabored respirations, crackles in bases bilaterally  Cardiovascular: RRR, no murmurs, rubs, or gallops, palpable pedal pulses bilaterally  Gastrointestinal: Positive bowel sounds, soft, nontender, nondistended  Musculoskeletal: No bilateral ankle edema, no clubbing or cyanosis to extremities  Psychiatric: deffered  Neurologic: strength symmetric in all extremities but overall weak, Cranial Nerves grossly intact to confrontation, nonverbal  Skin: No rashes       Results Reviewed:  I have personally reviewed most recent indicated data and agree with findings including:  [x]  Laboratory  [x]  Radiology  [x]  EKG/Telemetry  []  Pathology  [x]  Cardiac/Vascular Studies  [x]  Old records  []  Other:  Most pertinent findings include: vitals stable. Potassium 3.4. WBC 18.9. chemistry and hematology otherwise favorable. CTA chest negative for PE but notes bilateral pneumonia. CT imaging of head shows Subacute or old 3 cm infarct in the left corona " radiata and there is a smaller area of old ischemic change in the right corona radiata.      LAB RESULTS:      Lab 01/09/21  1825   WBC 18.96*   HEMOGLOBIN 13.7   HEMATOCRIT 39.8   PLATELETS 518*   NEUTROS ABS 13.47*   IMMATURE GRANS (ABS) 0.07*   LYMPHS ABS 3.24*   MONOS ABS 2.00*   EOS ABS 0.08   MCV 89.4   PROCALCITONIN 0.06   LACTATE 0.9   PROTIME 13.1         Lab 01/09/21  1825   SODIUM 138   POTASSIUM 3.4*   CHLORIDE 99   CO2 28.0   ANION GAP 11.0   BUN 7   CREATININE 1.28*   GLUCOSE 83   CALCIUM 8.7         Lab 01/09/21  1825   TOTAL PROTEIN 7.6   ALBUMIN 3.60   GLOBULIN 4.0   ALT (SGPT) 11   AST (SGOT) 16   BILIRUBIN 0.6   ALK PHOS 100         Lab 01/09/21  1825   PROBNP 67.3   TROPONIN T 0.017   PROTIME 13.1   INR 1.02                 Brief Urine Lab Results  (Last result in the past 365 days)      Color   Clarity   Blood   Leuk Est   Nitrite   Protein   CREAT   Urine HCG        01/09/21 1853 Dark Yellow Clear Negative Negative Negative >=300 mg/dL (3+)             Microbiology Results (last 10 days)     Procedure Component Value - Date/Time    COVID-19 and FLU A/B PCR - Swab, Nasopharynx [[id number]]  (Normal) Collected: 01/09/21 1825    Lab Status: Final result Specimen: Swab from Nasopharynx Updated: 01/09/21 2053     COVID19 Not Detected     Influenza A PCR Not Detected     Influenza B PCR Not Detected    Narrative:      Fact sheet for providers: https://www.fda.gov/media/771927/download    Fact sheet for patients: https://www.fda.gov/media/266150/download    Test performed by PCR.          Ct Head Without Contrast    Result Date: 1/9/2021  CT Head WO HISTORY: Recent stroke, altered mental status today TECHNIQUE: Axial unenhanced head CT. Radiation dose reduction techniques included automated exposure control or exposure modulation based on body size. Count of known CT and cardiac nuc med studies performed in previous 12 months: 0. Time of scan: 11:00 PM COMPARISON: None. FINDINGS: The ventricles and  subarachnoid spaces are normal. There is a large area of decreased density in the left anterior corona radiata consistent with a subacute infarct. This measures 3 cm in diameter. There is also old ischemic change in the right posterior corona radiata. No hemorrhage is identified. There are no extra-axial fluid collections.     Impression: Subacute or old 3 cm infarct in the left corona radiata and there is a smaller area of old ischemic change in the right corona radiata. No acute findings are identified.. Signer Name: Hamilton Vaz MD  Signed: 1/9/2021 7:23 PM  Workstation Name: [facility]    Ct Chest Without Contrast Diagnostic    Result Date: 1/9/2021  CT Chest WO INDICATION: Hypoxia and upper respiratory congestion today. Recent stroke TECHNIQUE: CT of the thorax without IV contrast. Coronal and sagittal reconstructions were obtained.  Radiation dose reduction techniques included automated exposure control or exposure modulation based on body size. Count of known CT and cardiac nuc med studies performed in previous 12 months: 0. COMPARISON: None available. FINDINGS: There is minimal interstitial infiltrate in the lingula and left lower lobe. Rest the lungs are clear. Thyroid gland is normal. Aorta is normal in size. There is no adenopathy. Upper abdominal images are unremarkable. Feeding tube has its tip in the stomach. Bones are unremarkable.     Impression: Minimal patchy infiltrate in the lingula with mild interstitial prominence in the left base. Otherwise lungs are clear Signer Name: Hamilton Vaz MD  Signed: 1/9/2021 7:25 PM  Workstation [facility]: [facility]    Ct Angiogram Chest    Result Date: 1/9/2021  INDICATION: Shortness of breath generalized weakness evaluate for PE. Patient had earlier noncontrast chest CT for shortness of breath. TECHNIQUE: CT angiogram of the chest with contrast. 3-D reformatted images were acquired and reviewed.  Intravenous contrast dose recorded in the patient's chart Radiation dose reduction techniques included automated exposure control or exposure modulation based on body size. Radiation audit for number of CT and nuclear cardiology exams performed in the last year:  1. There is limitation of study by the patient's inability to raise arms resulting in artifact. COMPARISON: There is an earlier noncontrast chest CT which is not adequate to evaluate for suspected pulmonary embolism. FINDINGS: There is no convincing evidence for pulmonary embolus. The more peripheral pulmonary arterial system is suboptimally assessed due to some breathing motion artifact and streak artifact from patient arms. There is no evidence for thoracic aortic dissection. There is a feeding tube in the stomach. There is no axillary lymphadenopathy. There are shotty mediastinal lymph nodes and mild thickening of central bronchovascular soft tissues bilaterally. There is no pleural or pericardial effusion. There is no pneumothorax. There is patchy airspace disease in the right upper lobe posteriorly. There is mild multifocal patchy airspace disease in the left greater the right lower lobe and in the lingula. Please correlate for clinical evidence of Covid pneumonia. This is mildly progressed from prior. Imaging features can be seen with COVID-19 pneumonia, although are nonspecific and can can occur with a variety of infectious and noninfectious processes. . Follow-up recommended. The patient is postcholecystectomy. There is streak artifact from contrast in the visualized colon.     Impression: 1. No convincing evidence for pulmonary embolus. There is some technical limitation for evaluation of the peripheral pulmonary arterial vessels. 2. Patchy bilateral airspace disease is mildly progressed from earlier. 3. Mild shotty mediastinal lymphadenopathy is nonspecific. Signer Name: Ines Lucas MD  Signed: 1/9/2021 9:29 PM  Workstation Name: [email or web address]   [facility]           Assessment/Plan   Assessment & Plan       Acute respiratory failure with hypoxia (CMS/[facility])    Sepsis due to pneumonia (CMS/[facility])    APRIL (acute kidney injury) (CMS/[facility])    AMS (altered mental status)    Hypokalemia    DM2 (diabetes mellitus, type 2) (CMS/[facility])    CAD (coronary artery disease)    CKD (chronic kidney disease)    Essential hypertension    HLD (hyperlipidemia)    History of CVA (cerebrovascular accident)    GERD without esophagitis    Tobacco abuse    Angel Grace is a 50 y.o. male with a past medical history significant for HLD, HTN, CAD, PVD, DM2, CKD, GERD, s/p splenectomy, and ongoing tobacco abuse with recent stroke, at [facility] for rehab - they sent him to ED for hypoxia, unresponsiveness.    1. Acute Respiratory Failure with Hypoxia:  - secondary to pneumonia with sepsis. COVID PCR negative  - concern for aspiration with KeoFeed, requiring freqeunt suction   - administered Levaquin in ED. Will change to merrem and vancomcyin  - obtain blood cultures, sputum culture, strep pneumo  - scheduled duo nebs  - additional pulmonary toilet as needed  - am labs    2. APRIL:  - history CKD. IVF x 12 hours  - avoid additional nephrotoxins    3. AMS- recent stroke  - imaging shows subacute stroke from recent admission  - will consider repeating MRI and need for neurology involvement  - repeat tox screen  - PT/OT/SLP  - nutrition consult  - resume medications; on statin, ASA, and plavix  -on tube feeds-  - need ACP discussions!    4. Hypokalemia:  - replace as needed per protocol  - no EKG changes  - check magnesium    5. DM2:  - uncontrolled. Recent A1c 9.3  - hold oral hypoglycemics  - start SSI as needed. Regular accu checks    6. Hypertension:  - controlled and stable  - continue home meds as tolerated    DVT prophylaxis:  ROLANDO      CODE STATUS:  Full code  Code Status and Medical Interventions:   Ordered at: 01/09/21 3196     Level Of Support Discussed With:    Patient  "    Code Status:    CPR     Medical Interventions (Level of Support Prior to Arrest):    Full       Electronically signed by Ángela Ba PA-C, 01/09/21, 11:15 PM EST.  Attending   Admission Attestation       I have seen and examined the patient, performing an independent face-to-face diagnostic evaluation with plan of care reviewed and developed with the advanced practice clinician (APC).      Brief Summary Statement:     Angel Grace is a 50 y.o. male with a past medical history significant for HLD, HTN, CAD, PVD, DM2, CKD, GERD, s/p splenectomy, and ongoing tobacco abuse with recent stroke, at [facility] for rehab - they sent him to ED for hypoxia, unresponsiveness.  Patient unable to provide any history  Remainder of detailed HPI is as noted by APC and has been reviewed and/or edited by me for completeness.    Attending Physical Exam:  BP (!) 170/103   Pulse 102   Temp 100.3 °F (37.9 °C) (Axillary)   Resp 16   Ht 188 cm (74\")   Wt 108 kg (237 lb 9.6 oz)   SpO2 97%   BMI 30.51 kg/m²     Patient is alert , aphasic has keofeed in place  Neck is without mass or JVD  Heart is Reg wo murmur  Lungs are clear wo wheeze or crackle  Abdomen is soft without HSM or mass, not tender or distended  Moves left arm and leg  Skin is without rash  Neurologic exam is grossly abnormal  Mood is sleepy but moves around in the bed    Brief Assessment/Plan :  See detailed assessment and plan developed with APC which I have reviewed and/or edited for completeness.    I believe this patient meets INPATIENT status due to acute hypoxia- probable aspiration pneumonia.  I feel patient’s risk for adverse outcomes and need for care warrant INPATIENT evaluation and I predict the patient’s care encounter to likely last beyond 2 midnights.      Electronically signed by Purvi Cardona MD, 01/10/21, 3:15 AM EST.         Electronically signed by Purvi Cardona MD at 01/10/21 0352          Emergency Department Notes      Ayesha Banerjee, " PA at 01/09/21 3590     Attestation signed by Keshav Shah MD at 01/09/21 6544          For this patient encounter, I reviewed the NP or PA documentation, treatment plan, and medical decision making. Keshav Shah MD 1/9/2021 23:19 EST                  Subjective   Pt is a 49 yo female presenting to ED by EMS with reports of AMS. PMHx significant for CVA, CAD, PVD, CKD, DM (insulin), HTN, HLD, GERD, Noncompliance and Drug use (cocaine). Pt sent to ED from [facility] after staff found patient altered than baseline. Report that patient wasn't tracking with his eyes or shaking his head in response to questions. On arrival to ED patient back to reported baseline of nodding head and following with eyes in response to questions. Per records patient presented to [facility] with stroke symptoms on 1-2-2021 and has residual right sided deficits and aphasia. Pt also noted to be hypoxic with O2 saturations in the mid 80's on RA. Pt does not wear O2 chronically. Pt unable to provide history. No reports from [facility] of recent fever, cough, SOB, vomiting or diarrhea. No reports of recent positive Covid testing.       History provided by:  Medical records, EMS personnel and nursing home      Review of Systems   Unable to perform ROS: Patient nonverbal   Constitutional: Negative for fever.   Respiratory: Negative for cough and shortness of breath.    Gastrointestinal: Negative for diarrhea and vomiting.   Psychiatric/Behavioral: Positive for confusion.       No past medical history on file.    Allergies   Allergen Reactions   • Penicillins Other (See Comments)     unknown       No past surgical history on file.    No family history on file.    Social History     Socioeconomic History   • Marital status: Single     Spouse name: Not on file   • Number of children: Not on file   • Years of education: Not on file   • Highest education level: Not on file           Objective   Physical Exam  Vitals signs  and nursing note reviewed.   Constitutional:       General: He is not in acute distress.  HENT:      Nose:      Comments: Feeding tube in place    Eyes:      Extraocular Movements: Extraocular movements intact.      Conjunctiva/sclera: Conjunctivae normal.      Pupils: Pupils are equal, round, and reactive to light.   Neck:      Musculoskeletal: Neck supple.   Cardiovascular:      Rate and Rhythm: Tachycardia present.   Pulmonary:      Effort: Pulmonary effort is normal.      Breath sounds: Rhonchi (upper airway) present.   Abdominal:      Palpations: Abdomen is soft.      Tenderness: There is no abdominal tenderness.   Musculoskeletal:         General: No swelling.   Skin:     General: Skin is warm.   Neurological:      Mental Status: He is alert.      Comments: Aphasic   Does not follow commands but tracks with his eyes and attempts to nod his head  Unable to move UE or LE.          Procedures          ED Course  ED Course as of Jan 09 2205   Sat Jan 09, 2021 1846 WBC(!): 18.96 [RT]   1856 ED staff has attempted multiple times to obtain records from [facility]. Left VM to call back to  3 times. Medical records closed at [place].     [RT]   1955 Discussed patient with Dr. Shah. Will obtain CTA to r/o PE due to acute hypoxia with recent medical hx.     [RT]   2024 Procalcitonin: 0.06 [RT]   2024 Lactate: 0.9 [RT]   2143 COVID19: Not Detected [RT]   2145 Obtained discharged summary / records from [facility] from [place] admission. Placed in ED room for admission.    [RT]   2150 CTA chest negative for PE but with bilateral pulmonary infiltrates. Will initiate Levaquin in ED.     [RT]   2200 Dr. Shah discussed admission with hospitalist Dr. Cardona    [RT]      ED Course User Index  [RT] Ayesha Banerjee PA      Re-examined patient and attempted to discuss results with plan for admission.     Patient maintaining O2 above 90% on 2 L.     Reviewed old records.       Recent Results (from the past 24  hour(s))   Comprehensive Metabolic Panel    Collection Time: 01/09/21  6:25 PM    Specimen: Blood   Result Value Ref Range    Glucose 83 65 - 99 mg/dL    BUN 7 6 - 20 mg/dL    Creatinine 1.28 (H) 0.76 - 1.27 mg/dL    Sodium 138 136 - 145 mmol/L    Potassium 3.4 (L) 3.5 - 5.2 mmol/L    Chloride 99 98 - 107 mmol/L    CO2 28.0 22.0 - 29.0 mmol/L    Calcium 8.7 8.6 - 10.5 mg/dL    Total Protein 7.6 6.0 - 8.5 g/dL    Albumin 3.60 3.50 - 5.20 g/dL    ALT (SGPT) 11 1 - 41 U/L    AST (SGOT) 16 1 - 40 U/L    Alkaline Phosphatase 100 39 - 117 U/L    Total Bilirubin 0.6 0.0 - 1.2 mg/dL    eGFR  African Amer 72 >60 mL/min/1.73    Globulin 4.0 gm/dL    A/G Ratio 0.9 g/dL    BUN/Creatinine Ratio 5.5 (L) 7.0 - 25.0    Anion Gap 11.0 5.0 - 15.0 mmol/L   Protime-INR    Collection Time: 01/09/21  6:25 PM    Specimen: Blood   Result Value Ref Range    Protime 13.1 11.5 - 14.0 Seconds    INR 1.02 0.85 - 1.16   COVID-19 and FLU A/B PCR - Swab, Nasopharynx    Collection Time: 01/09/21  6:25 PM    Specimen: Nasopharynx; Swab   Result Value Ref Range    COVID19 Not Detected Not Detected - Ref. Range    Influenza A PCR Not Detected Not Detected    Influenza B PCR Not Detected Not Detected   BNP    Collection Time: 01/09/21  6:25 PM    Specimen: Blood   Result Value Ref Range    proBNP 67.3 0.0 - 900.0 pg/mL   Troponin    Collection Time: 01/09/21  6:25 PM    Specimen: Blood   Result Value Ref Range    Troponin T 0.017 0.000 - 0.030 ng/mL   Lactic Acid, Plasma    Collection Time: 01/09/21  6:25 PM    Specimen: Blood   Result Value Ref Range    Lactate 0.9 0.5 - 2.0 mmol/L   CBC Auto Differential    Collection Time: 01/09/21  6:25 PM    Specimen: Blood   Result Value Ref Range    WBC 18.96 (H) 3.40 - 10.80 10*3/mm3    RBC 4.45 4.14 - 5.80 10*6/mm3    Hemoglobin 13.7 13.0 - 17.7 g/dL    Hematocrit 39.8 37.5 - 51.0 %    MCV 89.4 79.0 - 97.0 fL    MCH 30.8 26.6 - 33.0 pg    MCHC 34.4 31.5 - 35.7 g/dL    RDW 11.2 (L) 12.3 - 15.4 %    RDW-SD 36.1  (L) 37.0 - 54.0 fl    MPV 10.9 6.0 - 12.0 fL    Platelets 518 (H) 140 - 450 10*3/mm3    Neutrophil % 71.1 42.7 - 76.0 %    Lymphocyte % 17.1 (L) 19.6 - 45.3 %    Monocyte % 10.5 5.0 - 12.0 %    Eosinophil % 0.4 0.3 - 6.2 %    Basophil % 0.5 0.0 - 1.5 %    Immature Grans % 0.4 0.0 - 0.5 %    Neutrophils, Absolute 13.47 (H) 1.70 - 7.00 10*3/mm3    Lymphocytes, Absolute 3.24 (H) 0.70 - 3.10 10*3/mm3    Monocytes, Absolute 2.00 (H) 0.10 - 0.90 10*3/mm3    Eosinophils, Absolute 0.08 0.00 - 0.40 10*3/mm3    Basophils, Absolute 0.10 0.00 - 0.20 10*3/mm3    Immature Grans, Absolute 0.07 (H) 0.00 - 0.05 10*3/mm3    nRBC 0.0 0.0 - 0.2 /100 WBC   Light Blue Top    Collection Time: 01/09/21  6:25 PM   Result Value Ref Range    Extra Tube hold for add-on    Green Top (Gel)    Collection Time: 01/09/21  6:25 PM   Result Value Ref Range    Extra Tube Hold for add-ons.    Lavender Top    Collection Time: 01/09/21  6:25 PM   Result Value Ref Range    Extra Tube hold for add-on    Gold Top - SST    Collection Time: 01/09/21  6:25 PM   Result Value Ref Range    Extra Tube Hold for add-ons.    Procalcitonin    Collection Time: 01/09/21  6:25 PM    Specimen: Blood   Result Value Ref Range    Procalcitonin 0.06 0.00 - 0.25 ng/mL   Urinalysis With Culture If Indicated - Urine, Catheter    Collection Time: 01/09/21  6:53 PM    Specimen: Urine, Catheter   Result Value Ref Range    Color, UA Dark Yellow (A) Yellow, Straw    Appearance, UA Clear Clear    pH, UA <=5.0 5.0 - 8.0    Specific Gravity, UA 1.027 1.001 - 1.030    Glucose, UA Negative Negative    Ketones, UA 15 mg/dL (1+) (A) Negative    Bilirubin, UA Negative Negative    Blood, UA Negative Negative    Protein, UA >=300 mg/dL (3+) (A) Negative    Leuk Esterase, UA Negative Negative    Nitrite, UA Negative Negative    Urobilinogen, UA 1.0 E.U./dL 0.2 - 1.0 E.U./dL   Urinalysis, Microscopic Only - Urine, Catheter    Collection Time: 01/09/21  6:53 PM    Specimen: Urine, Catheter    Result Value Ref Range    RBC, UA 0-2 None Seen, 0-2 /HPF    WBC, UA 0-2 None Seen, 0-2 /HPF    Bacteria, UA None Seen None Seen, Trace /HPF    Squamous Epithelial Cells, UA 0-2 None Seen, 0-2 /HPF    Hyaline Casts, UA 7-12 0 - 6 /LPF    Methodology Automated Microscopy      Note: In addition to lab results from this visit, the labs listed above may include labs taken at another facility or during a different encounter within the last 24 hours. Please correlate lab times with ED admission and discharge times for further clarification of the services performed during this visit.    CT Angiogram Chest   Final Result      1. No convincing evidence for pulmonary embolus. There is some technical limitation for evaluation of the peripheral pulmonary arterial vessels.   2. Patchy bilateral airspace disease is mildly progressed from earlier.   3. Mild shotty mediastinal lymphadenopathy is nonspecific.      Signer Name: Ines Lucas MD    Signed: 1/9/2021 9:29 PM    Workstation Name: [facility]      CT Head Without Contrast   Final Result   Subacute or old 3 cm infarct in the left corona radiata and there is a smaller area of old ischemic change in the right corona radiata. No acute findings are identified..               Signer Name: Hamilton Vaz MD    Signed: 1/9/2021 7:23 PM    Workstation Name: [facility]      CT CHEST WITHOUT CONTRAST DIAGNOSTIC   Final Result   Minimal patchy infiltrate in the lingula with mild interstitial prominence in the left base. Otherwise lungs are clear      Signer Name: Hamilton Vaz MD    Signed: 1/9/2021 7:25 PM    Workstation Name: [facility]        Vitals:    01/09/21 1930 01/09/21 2000 01/09/21 2030 01/09/21 2130   BP: (!) 150/101 (!) 151/107 166/93 150/78   BP Location: Right arm Right arm Right arm Right arm   Patient Position: Lying Lying Lying Lying   Pulse: 111 118  120 114   Resp: 18 16 18 16   Temp:       TempSrc:       SpO2: 99% 96% 94% 93%   Weight:       Height:         Medications   sodium chloride 0.9 % flush 10 mL (has no administration in time range)   lactated ringers bolus 1,000 mL (1,000 mL Intravenous New Bag 1/9/21 2153)   levoFLOXacin (LEVAQUIN) 750 mg/150 mL D5W (premix) (LEVAQUIN) 750 mg (has no administration in time range)   iopamidol (ISOVUE-370) 76 % injection 100 mL (85 mL Intravenous Given 1/9/21 2100)     ECG/EMG Results (last 24 hours)     Procedure Component Value Units Date/Time    ECG 12 Lead [[id number]] Collected: 01/09/21 1810     Updated: 01/09/21 1811        ECG 12 Lead               COVID-19 RISK SCREEN     1. Has the patient had close contact without PPE with a lab confirmed COVID-19 (+) person or a person under investigation (PUI) for COVID-19 infection?  -- No     2. Has the patient had respiratory symptoms, worsened/new cough and/or SOA, unexplained fever, or sudden loss of smell and/or taste in the past 7 days? --  Yes    3. Does the patient have baseline higher exposure risk such as working in healthcare field, currently residing in healthcare facility, or ongoing hemodialysis?  --  Yes                                       MDM    Final diagnoses:   Hypoxia   Pneumonia of both lungs due to infectious organism, unspecified part of lung   Recent cerebrovascular accident (CVA)   Altered mental status, unspecified altered mental status type            Ayesha Banerjee PA  01/09/21 2215      Electronically signed by Keshav Shah MD at 01/09/21 2319     Courtney James at 01/09/21 1837        CALLED HOUSE SUPERVISOR AT [facility] 3 TIMES TO OBTAIN MEDICAL RECORDS DUE TO RECORDS DEPT CLOSED ON SATURDAY. NO ANSWER AND LEFT MESSAGES TO RETURN CALL.     Courtney James  01/09/21 1839      Electronically signed by Courtney James at 01/09/21 1839     Courtney James at 01/09/21 1907        SPOKE WITH C[facility]  ABOUT ANY RECORDS FROM [facility] INFORMATION THAT THEY HAVE TO US      Courtney James  01/09/21 1908      Electronically signed by Courtney James at 01/09/21 1908       Vital Signs (last day)     Date/Time   Temp   Temp src   Pulse   Resp   BP   Patient Position   SpO2    01/10/21 1000   --   --   (!) 121   --   --   --   --    01/10/21 0802   --   --   114   18   --   --   --    01/10/21 0800   --   --   119   --   --   --   --    01/10/21 0710   99.6 (37.6)   Axillary   106   18   166/100   Lying   97    01/10/21 0500   --   --   105   --   --   --   98    01/10/21 0303   --   --   102   --   --   Lying   97    01/10/21 0300   --   --   111   --   --   --   95    01/10/21 0225   100 (37.8)   Axillary   106   18   (!) 138/117   Lying   96    01/10/21 0126   100.3 (37.9)   Axillary   --   --   --   Lying   --    01/10/21 0100   --   --   --   --   (!) 170/103   --   99    01/10/21 0000   --   --   --   --   (!) 175/110   --   96    01/09/21 2330   --   --   --   --   (!) 183/115   --   --    01/09/21 2300   --   --   --   --   (!) 166/109   --   96    01/09/21 2230   --   --   109   --   160/83   --   --    01/09/21 2200   --   --   112   --   142/90   --   94    01/09/21 2130   --   --   114   16   150/78   Lying   93    01/09/21 2030   --   --   120   18   166/93   Lying   94    01/09/21 2000   --   --   118   16   (!) 151/107   Lying   96    01/09/21 1930   --   --   111   18   (!) 150/101   Lying   99    01/09/21 1901   --   --   115   --   (!) 168/129   --   93    01/09/21 1830   --   --   111   16   (!) 164/108   Lying   94    01/09/21 1803   98 (36.7)   Oral   102   16   (!) 148/101   Lying   99    01/09/21 1801   --   --   103   16   (!) 148/101   Lying   99                Current Facility-Administered Medications   Medication Dose Route Frequency Provider Last Rate Last Admin   • acetaminophen (TYLENOL) tablet 650 mg  650 mg Oral Q4H PRN Ángela Ba PA-C       • heparin  (porcine) 5000 UNIT/ML injection 5,000 Units  5,000 Units Subcutaneous Q12H Ángela Ba PA-C   5,000 Units at 01/10/21 0855   • ipratropium-albuterol (DUO-NEB) nebulizer solution 3 mL  3 mL Nebulization Q4H PRN Ángela Ba PA-C   3 mL at 01/10/21 0802   • melatonin tablet 5 mg  5 mg Oral Nightly PRN Ángela Ba PA-C       • meropenem (MERREM) 1 g/100 mL 0.9% NS VTB (mbp)  1 g Intravenous Q8H Ángela Ba PA-C   1 g at 01/10/21 0855   • ondansetron (ZOFRAN) injection 4 mg  4 mg Intravenous Q6H PRN Ángela Ba, PA-C       • sodium chloride 0.9 % flush 10 mL  10 mL Intravenous PRN Ayesha Banerjee, PA       • sodium chloride 0.9 % flush 10 mL  10 mL Intravenous Q12H Ángela Ba PA-C   10 mL at 01/10/21 0855   • sodium chloride 0.9 % flush 10 mL  10 mL Intravenous PRN Ángela Ba PA-C       • sodium chloride 0.9 % infusion  75 mL/hr Intravenous Continuous Ángela Ba PA-C 75 mL/hr at 01/10/21 0223 75 mL/hr at 01/10/21 0223     Orders (active)      Start     Ordered    01/11/21 0600  NPO Diet  Diet Effective Midnight      01/10/21 0148    01/10/21 0857  Nutrition Consult  Once     Provider:  (Not yet assigned)    01/10/21 0856    01/10/21 0812  Urine Drug Screen - Urine, Clean Catch  Once      01/10/21 0811    01/10/21 0808  Restraints Non-Violent or Non-Self Destructive  Calendar Day      01/10/21 0807    01/10/21 0600  meropenem (MERREM) 1 g/100 mL 0.9% NS VTB (mbp)  Every 8 Hours      01/09/21 2312    01/10/21 0600  POC Glucose Finger Q6H  Every 6 Hours      01/10/21 0148    01/10/21 0400  Vital Signs  Every 4 Hours      01/10/21 0148    01/10/21 0330  MetaNeb  Every 4 Hours - RT      01/10/21 0155    01/10/21 0245  sodium chloride 0.9 % flush 10 mL  Every 12 Hours Scheduled      01/10/21 0148    01/10/21 0245  heparin (porcine) 5000 UNIT/ML injection 5,000 Units  Every 12 Hours Scheduled      01/10/21 0148    01/10/21 0245  sodium chloride 0.9 % infusion   Continuous      01/10/21 0148    01/10/21 0149  Weigh Patient  Once      01/10/21 0148    01/10/21 0149  Oxygen Therapy- Nasal Cannula; Titrate for SPO2: 90% - 95%  Continuous      01/10/21 0148    01/10/21 0149  Insert Peripheral IV  Once      01/10/21 0148    01/10/21 0149  Saline Lock & Maintain IV Access  Continuous      01/10/21 0148    01/10/21 0149  OT Consult: Eval & Treat  Once      01/10/21 0148    01/10/21 0149  PT Consult: Eval & Treat Functional Mobility Below Baseline  Once      01/10/21 0148    01/10/21 0149  SLP Consult: Eval & Treat Swallow Disorder; Regular - No Dietary Restrictions  Once      01/10/21 0148    01/10/21 0149  Inpatient Nutrition Consult  Once     Comments: Has mookie   Provider:  (Not yet assigned)    01/10/21 0148    01/10/21 0148  sodium chloride 0.9 % flush 10 mL  As Needed      01/10/21 0148    01/10/21 0148  acetaminophen (TYLENOL) tablet 650 mg  Every 4 Hours PRN      01/10/21 0148    01/10/21 0148  melatonin tablet 5 mg  Nightly PRN      01/10/21 0148    01/10/21 0148  ondansetron (ZOFRAN) injection 4 mg  Every 6 Hours PRN      01/10/21 0148    01/10/21 0148  Intake & Output  Every Shift      01/10/21 0148    01/10/21 0141  S. Pneumo Ag Urine or CSF - Urine, Urine, Clean Catch  Once      01/09/21 2311    01/10/21 0134  Nasotracheal Suctioning (RT)  Once      01/10/21 0133    01/10/21 0045  SLP Consult: Eval & Treat RN Dysphagia Screen Failed  Once      01/10/21 0044    01/09/21 2312  ipratropium-albuterol (DUO-NEB) nebulizer solution 3 mL  Every 4 Hours PRN      01/09/21 2312 01/09/21 2300  Code Status and Medical Interventions:  Continuous      01/09/21 2308    01/09/21 1800  Blood Culture - Blood, Arm, Right  Once      01/09/21 1800    01/09/21 1800  Blood Culture - Blood, Hand, Left  Once      01/09/21 1800    01/09/21 1800  Insert peripheral IV  Once      01/09/21 1800    01/09/21 1759  sodium chloride 0.9 % flush 10 mL  As Needed      01/09/21 1800                 Operative/Procedure Notes (all)    No notes of this type exist for this encounter.         Physician Progress Notes (all)    No notes of this type exist for this encounter.            Consult Notes (all)      Thomas Huertas IV PharmD at 01/10/21 0410          Pharmacy Consult-Vancomycin Dosing  Angel Grace is a  50 y.o. male receiving vancomycin therapy.     Indication: PNA  Consulting Provider: Hospitalist  ID Consult: N    Goal AUC: 400 - 600 mg/L*hr    Current Antimicrobial Therapy  Anti-Infectives (From admission, onward)      Ordered     Dose/Rate Route Frequency Start Stop    01/10/21 0408  ! Vancomycin level Monday at 1730. Hold 1800 dose until lab collected     Ordering Provider: Thomas Huertas IV PharmD     Does not apply Once 01/11/21 1700      01/10/21 0408  vancomycin (VANCOCIN) in iso-osmotic dextrose IVPB 1 g (premix) 200 mL     Ordering Provider: Thomas Huertas IV PharmD    1,000 mg  over 60 Minutes Intravenous Every 12 Hours 01/10/21 1800 01/17/21 1759    01/09/21 2312  meropenem (MERREM) 1 g/100 mL 0.9% NS VTB (mbp)     Ordering Provider: Ángela Ba PA-C    1 g  over 3 Hours Intravenous Every 8 Hours 01/10/21 0600 01/17/21 0559    01/09/21 2315  vancomycin 2250 mg/500 mL 0.9% NS IVPB ([facility])     Ordering Provider: Thomas Huertas IV PharmD    20 mg/kg × 109 kg  over 180 Minutes Intravenous Once 01/09/21 2359      01/09/21 2312  meropenem (MERREM) 1 g/100 mL 0.9% NS VTB (mbp)     Ordering Provider: Ángela Ba PA-C    1 g  over 30 Minutes Intravenous Once 01/09/21 2314 01/10/21 0045    01/09/21 2312  Pharmacy to dose vancomycin     Ordering Provider: Ángela Ba PA-C     Does not apply Continuous PRN 01/09/21 2311 01/16/21 2310            Allergies  Allergies as of 01/09/2021 - Reviewed 01/09/2021   Allergen Reaction Noted   • Penicillins Other (See Comments) 01/09/2021       Labs    Results from last 7 days   Lab Units 01/09/21  1825   BUN mg/dL 7  "  CREATININE mg/dL 1.28*       Results from last 7 days   Lab Units 01/09/21  1825   WBC 10*3/mm3 18.96*       Evaluation of Dosing     Last Dose Received in the ED/Outside Facility: N/A  Is Patient on Dialysis or Renal Replacement: N    Ht - 188 cm (74\")  Wt - 108 kg (237 lb 9.6 oz)    Estimated Creatinine Clearance: 89.3 mL/min (A) (by C-G formula based on SCr of 1.28 mg/dL (H)).    Intake & Output (last 3 days)         01/07 0701 - 01/08 0700 01/08 0701 - 01/09 0700 01/09 0701 - 01/10 0700           Urine Unmeasured Occurrence   2 x            Microbiology and Radiology  Microbiology Results (last 10 days)       Procedure Component Value - Date/Time    COVID-19 and FLU A/B PCR - Swab, Nasopharynx [[id number]]  (Normal) Collected: 01/09/21 1825    Lab Status: Final result Specimen: Swab from Nasopharynx Updated: 01/09/21 2053     COVID19 Not Detected     Influenza A PCR Not Detected     Influenza B PCR Not Detected    Narrative:      Fact sheet for providers: https://www.fda.gov/media/486984/download    Fact sheet for patients: https://www.fda.gov/media/569882/download    Test performed by PCR.            Reported Vancomycin Levels                         InsightRX AUC Calculation:    Current AUC:    mg/L*hr    Predicted Steady State AUC on Current Dose:  mg/L*hr  _________________________________    Predicted Steady State AUC on New Dose:   496 mg/L*hr    Assessment/Plan:    Moderately young obese male, risk for nosocomial/aspiration PNA secondary to recent stroke with rehabilitation stay.   Acute on chronic kidney disease, receiving fluid resuscitation.    2250 mg (20 mg/kg) ABW IV vancomycin load infusing.  Given APRIL and obesity, limit initial dosing to 1000 mg IV q12h starting at 1800.    Trough tomorrow prior to 4th dose.    MRSA PCR ordered per protocol.    Thank you for this consult.  Thomas Huertas IV, PharmD, BCPS  1/10/2021  04:10 EST      Electronically signed by Thomas Huertas IV, PharmD at " 01/10/21 0410

## 2021-01-10 NOTE — SIGNIFICANT NOTE
01/10/21 0919   SLP Deferred Reason   SLP Deferred Reason Unable to treat, medical status change  (Pt not appropriate for eval at this time, will f/u tomorrow for status.)

## 2021-01-10 NOTE — CONSULTS
"Pharmacy Consult-Vancomycin Dosing  Angel Grace is a  50 y.o. male receiving vancomycin therapy.     Indication: PNA  Consulting Provider: Hospitalist  ID Consult: N    Goal AUC: 400 - 600 mg/L*hr    Current Antimicrobial Therapy  Anti-Infectives (From admission, onward)      Ordered     Dose/Rate Route Frequency Start Stop    01/10/21 0408  ! Vancomycin level Monday at 1730. Hold 1800 dose until lab collected     Ordering Provider: Thomas Huertas IV, PharmD     Does not apply Once 01/11/21 1700      01/10/21 0408  vancomycin (VANCOCIN) in iso-osmotic dextrose IVPB 1 g (premix) 200 mL     Ordering Provider: Thomas Huertas IV, PharmD    1,000 mg  over 60 Minutes Intravenous Every 12 Hours 01/10/21 1800 01/17/21 1759    01/09/21 2312  meropenem (MERREM) 1 g/100 mL 0.9% NS VTB (mbp)     Ordering Provider: Ángela Ba PA-C    1 g  over 3 Hours Intravenous Every 8 Hours 01/10/21 0600 01/17/21 0559    01/09/21 2315  vancomycin 2250 mg/500 mL 0.9% NS IVPB ([facility])     Ordering Provider: Thomas Huertas IV, PharmD    20 mg/kg × 109 kg  over 180 Minutes Intravenous Once 01/09/21 2359      01/09/21 2312  meropenem (MERREM) 1 g/100 mL 0.9% NS VTB (mbp)     Ordering Provider: Ángela Ba PA-C    1 g  over 30 Minutes Intravenous Once 01/09/21 2314 01/10/21 0045    01/09/21 2312  Pharmacy to dose vancomycin     Ordering Provider: Ángela Ba PA-C     Does not apply Continuous PRN 01/09/21 2311 01/16/21 2310            Allergies  Allergies as of 01/09/2021 - Reviewed 01/09/2021   Allergen Reaction Noted    Penicillins Other (See Comments) 01/09/2021       Labs    Results from last 7 days   Lab Units 01/09/21  1825   BUN mg/dL 7   CREATININE mg/dL 1.28*       Results from last 7 days   Lab Units 01/09/21  1825   WBC 10*3/mm3 18.96*       Evaluation of Dosing     Last Dose Received in the ED/Outside Facility: N/A  Is Patient on Dialysis or Renal Replacement: N    Ht - 188 cm (74\")  Wt - 108 kg (237 lb " 9.6 oz)    Estimated Creatinine Clearance: 89.3 mL/min (A) (by C-G formula based on SCr of 1.28 mg/dL (H)).    Intake & Output (last 3 days)         01/07 0701 - 01/08 0700 01/08 0701 - 01/09 0700 01/09 0701 - 01/10 0700           Urine Unmeasured Occurrence   2 x            Microbiology and Radiology  Microbiology Results (last 10 days)       Procedure Component Value - Date/Time    COVID-19 and FLU A/B PCR - Swab, Nasopharynx [[id number]]  (Normal) Collected: 01/09/21 1825    Lab Status: Final result Specimen: Swab from Nasopharynx Updated: 01/09/21 2053     COVID19 Not Detected     Influenza A PCR Not Detected     Influenza B PCR Not Detected    Narrative:      Fact sheet for providers: https://www.fda.gov/media/831879/download    Fact sheet for patients: https://www.fda.gov/media/135053/download    Test performed by PCR.            Reported Vancomycin Levels                         InsightRX AUC Calculation:    Current AUC:    mg/L*hr    Predicted Steady State AUC on Current Dose:  mg/L*hr  _________________________________    Predicted Steady State AUC on New Dose:   496 mg/L*hr    Assessment/Plan:    Moderately young obese male, risk for nosocomial/aspiration PNA secondary to recent stroke with rehabilitation stay.   Acute on chronic kidney disease, receiving fluid resuscitation.    2250 mg (20 mg/kg) ABW IV vancomycin load infusing.  Given APRIL and obesity, limit initial dosing to 1000 mg IV q12h starting at 1800.    Trough tomorrow prior to 4th dose.    MRSA PCR ordered per protocol.    Thank you for this consult.  Thomas Huertas IV, PharmD, BCPS  1/10/2021  04:10 EST

## 2021-01-10 NOTE — PROGRESS NOTES
[facility] Medicine Services  PROGRESS NOTE    Patient Name: Angel Grace  : 1970  MRN: [id number]    Date of Admission: 2021  Primary Care Physician: Provider, No Known    Subjective   Subjective     CC:  Follow up for AMS     HPI:  Patient lethargic, opens eyes, nonverbal, unable to communicate effectively.    ROS:  Unable to assess due to patients mental status     Objective   Objective     Vital Signs:   Temp:  [98 °F (36.7 °C)-100.3 °F (37.9 °C)] 99.9 °F (37.7 °C)  Heart Rate:  [102-121] 110  Resp:  [16-18] 18  BP: (138-183)/() 171/103  Total (NIH Stroke Scale): 23     Physical Exam:  Constitutional: No acute distress, lethargic   HENT: NCAT, mucous membranes moist  Respiratory: has ronchi bilaterally, respiratory effort normal on nasal canula   Cardiovascular: RRR, no murmurs, rubs, or gallops  Gastrointestinal: Positive bowel sounds, soft, nontender, nondistended  Musculoskeletal: No bilateral ankle edema  Neurologic: lethargic, in four point restraints   Skin: No rashes    Results Reviewed:  Results from last 7 days   Lab Units 01/10/21  0741 21  1825   WBC 10*3/mm3 23.38* 18.96*   HEMOGLOBIN g/dL 13.0 13.7   HEMATOCRIT % 38.1 39.8   PLATELETS 10*3/mm3 499* 518*   INR   --  1.02   PROCALCITONIN ng/mL  --  0.06     Results from last 7 days   Lab Units 01/10/21  0741 21  1825   SODIUM mmol/L 136 138   POTASSIUM mmol/L 3.7 3.4*   CHLORIDE mmol/L 100 99   CO2 mmol/L 27.0 28.0   BUN mg/dL 8 7   CREATININE mg/dL 1.15 1.28*   GLUCOSE mg/dL 140* 83   CALCIUM mg/dL 8.5* 8.7   ALT (SGPT) U/L  --  11   AST (SGOT) U/L  --  16   TROPONIN T ng/mL  --  0.017   PROBNP pg/mL  --  67.3     Estimated Creatinine Clearance: 99.4 mL/min (by C-G formula based on SCr of 1.15 mg/dL).    Microbiology Results Abnormal     Procedure Component Value - Date/Time    MRSA Screen, PCR (Inpatient) - Swab, Nares [[id number]]  (Normal) Collected: 01/10/21 0052    Lab Status: Final result  Specimen: Swab from Nares Updated: 01/10/21 0802     MRSA PCR Negative    Narrative:      MRSA Negative    COVID-19 and FLU A/B PCR - Swab, Nasopharynx [[id number]]  (Normal) Collected: 01/09/21 1825    Lab Status: Final result Specimen: Swab from Nasopharynx Updated: 01/09/21 2053     COVID19 Not Detected     Influenza A PCR Not Detected     Influenza B PCR Not Detected    Narrative:      Fact sheet for providers: https://www.fda.gov/media/435988/download    Fact sheet for patients: https://www.fda.gov/media/816746/download    Test performed by PCR.          Imaging Results (Last 24 Hours)     Procedure Component Value Units Date/Time    XR Abdomen KUB [[id number]] Collected: 01/10/21 0806     Updated: 01/10/21 0810    Narrative:      EXAMINATION: XR ABDOMEN KUB-      INDICATION: HARPREET feed placement verification; R09.02-Hypoxemia;  J18.9-Pneumonia, unspecified organism; Z86.73-Personal history of  transient ischemic attack (TIA), and cerebral infarction without  residual deficits; R41.82-Altered mental status, unspecified      COMPARISON: NONE     FINDINGS: Feeding tube is coiled in the proximal stomach. Bowel gas  pattern is nonobstructive.       Impression:      Feeding tube is coiled in the proximal stomach. Bowel gas  pattern is nonobstructive.     This report was finalized on 1/10/2021 8:07 AM by Thoams Bowen.       CT Angiogram Chest [[id number]] Collected: 01/09/21 2129     Updated: 01/09/21 2131    Narrative:      INDICATION:   Shortness of breath generalized weakness evaluate for PE. Patient had earlier noncontrast chest CT for shortness of breath.    TECHNIQUE:   CT angiogram of the chest with contrast. 3-D reformatted images were acquired and reviewed. Intravenous contrast dose recorded in the patient's chart Radiation dose reduction techniques included automated exposure control or exposure modulation based on  body size. Radiation audit for number of CT and nuclear cardiology exams performed in the  last year:  1. There is limitation of study by the patient's inability to raise arms resulting in artifact.    COMPARISON:   There is an earlier noncontrast chest CT which is not adequate to evaluate for suspected pulmonary embolism.    FINDINGS:   There is no convincing evidence for pulmonary embolus. The more peripheral pulmonary arterial system is suboptimally assessed due to some breathing motion artifact and streak artifact from patient arms. There is no evidence for thoracic aortic  dissection. There is a feeding tube in the stomach. There is no axillary lymphadenopathy. There are shotty mediastinal lymph nodes and mild thickening of central bronchovascular soft tissues bilaterally. There is no pleural or pericardial effusion. There  is no pneumothorax. There is patchy airspace disease in the right upper lobe posteriorly.    There is mild multifocal patchy airspace disease in the left greater the right lower lobe and in the lingula. Please correlate for clinical evidence of Covid pneumonia. This is mildly progressed from prior. Imaging features can be seen with COVID-19  pneumonia, although are nonspecific and can can occur with a variety of infectious and noninfectious processes. . Follow-up recommended. The patient is postcholecystectomy. There is streak artifact from contrast in the visualized colon.      Impression:        1. No convincing evidence for pulmonary embolus. There is some technical limitation for evaluation of the peripheral pulmonary arterial vessels.  2. Patchy bilateral airspace disease is mildly progressed from earlier.  3. Mild shotty mediastinal lymphadenopathy is nonspecific.    Signer Name: Ines Lucas MD   Signed: 1/9/2021 9:29 PM   Workstation Name: [email or web address]    [facility]    CT CHEST WITHOUT CONTRAST DIAGNOSTIC [[id number]] Collected: 01/09/21 1925     Updated: 01/09/21 1927    Narrative:      CT Chest WO    INDICATION:   Hypoxia and upper respiratory  congestion today. Recent stroke    TECHNIQUE:   CT of the thorax without IV contrast. Coronal and sagittal reconstructions were obtained.  Radiation dose reduction techniques included automated exposure control or exposure modulation based on body size. Count of known CT and cardiac nuc med studies  performed in previous 12 months: 0.     COMPARISON:   None available.    FINDINGS:  There is minimal interstitial infiltrate in the lingula and left lower lobe. Rest the lungs are clear. Thyroid gland is normal. Aorta is normal in size. There is no adenopathy. Upper abdominal images are unremarkable. Feeding tube has its tip in the  stomach. Bones are unremarkable.      Impression:      Minimal patchy infiltrate in the lingula with mild interstitial prominence in the left base. Otherwise lungs are clear    Signer Name: Hamilton Vaz MD   Signed: 1/9/2021 7:25 PM   Workstation [facility]: [facility]    [facility]    CT Head Without Contrast [[id number]] Collected: 01/09/21 1923     Updated: 01/09/21 1925    Narrative:      CT Head WO    HISTORY:   Recent stroke, altered mental status today    TECHNIQUE:   Axial unenhanced head CT. Radiation dose reduction techniques included automated exposure control or exposure modulation based on body size. Count of known CT and cardiac nuc med studies performed in previous 12 months: 0.     Time of scan: 11:00 PM    COMPARISON:   None.    FINDINGS:   The ventricles and subarachnoid spaces are normal. There is a large area of decreased density in the left anterior corona radiata consistent with a subacute infarct. This measures 3 cm in diameter. There is also old ischemic change in the right posterior  corona radiata. No hemorrhage is identified. There are no extra-axial fluid collections.      Impression:      Subacute or old 3 cm infarct in the left corona radiata and there is a smaller area of old ischemic change in the right corona radiata. No acute findings are  identified..          Signer Name: Hamilton Vaz MD   Signed: 1/9/2021 7:23 PM   Workstation Name: [facility]    [facility]               I have reviewed the medications:  Scheduled Meds:heparin (porcine), 5,000 Units, Subcutaneous, Q12H  meropenem, 1 g, Intravenous, Q8H  sodium chloride, 10 mL, Intravenous, Q12H      Continuous Infusions:sodium chloride, 75 mL/hr, Last Rate: 75 mL/hr (01/10/21 0223)      PRN Meds:.•  acetaminophen  •  ipratropium-albuterol  •  melatonin  •  ondansetron  •  [COMPLETED] Insert peripheral IV **AND** sodium chloride  •  sodium chloride    Assessment/Plan   Assessment & Plan     Active Hospital Problems    Diagnosis  POA   • **Acute respiratory failure with hypoxia (CMS/[facility]) [J96.01]  Yes   • Sepsis due to pneumonia (CMS/[facility]) [J18.9, A41.9]  Yes   • Hypokalemia [E87.6]  Yes   • APRIL (acute kidney injury) (CMS/[facility]) [N17.9]  Yes   • History of CVA (cerebrovascular accident) [Z86.73]  Not Applicable   • GERD without esophagitis [K21.9]  Yes   • DM2 (diabetes mellitus, type 2) (CMS/[facility]) [E11.9]  Yes   • CAD (coronary artery disease) [I25.10]  Yes   • CKD (chronic kidney disease) [N18.9]  Yes   • Tobacco abuse [Z72.0]  Yes   • AMS (altered mental status) [R41.82]  Yes   • Essential hypertension [I10]  Yes   • HLD (hyperlipidemia) [E78.5]  Yes      Resolved Hospital Problems   No resolved problems to display.        Brief Hospital Course to date:  Angel Grace is a 50 y.o. male With a PMH of HLD, HTN, CAD, PVD, T2DM, CKD, GERD, s/p splenectomy, and ongoing tobacco abuse with recent CVA who was admitted on 1/9/21 from [facility] for hypoxia and unresponsiveness.    Patient and problems new to me today    Acute respiratory failure with hypoxia   Sepsis  (tachycardia and leukocytosis)  -CTA chest without PE, has patchy bilateral airspace disease, Covid negative   -Continue merrem and d/c vancomycin, MRSA nares negative   -Continue supplemental oxygen and nebs as needed, MetaNeb  every 4 hours  -Continue nasotracheal suctioning  -Strep pneumo and legionella urine antigens pending, blood cultures pending    APRIL-improving  -Avoid nephrotoxins   -CTM BMP     AMS and recent CVA with residual dysphagia on tube feeds   -KUB with NG tube in stomach   -Continue tube feeds, consult nutrition   -Tox screen +cocaine  -Resume aspirin and Plavix , And statin per NG tube    Hypokalemia   -Replete per protocol    T2DM  -Continue sliding scale insulin    HTN  -Resume Coreg per NG tube, hold losartan due to APRIL    GERD - resume PPI      DVT Prophylaxis:  heparin      Disposition: I expect the patient to be discharged pending improvement in AMS.    CODE STATUS:   Code Status and Medical Interventions:   Ordered at: 01/09/21 4425     Level Of Support Discussed With:    Patient     Code Status:    CPR     Medical Interventions (Level of Support Prior to Arrest):    Full       Margaret Dow MD  01/10/21

## 2021-01-10 NOTE — H&P
"    [facility]  HISTORY AND PHYSICAL    Patient Name: Angel Grace  : 1970  MRN: [id number]  Primary Care Physician: Provider, No Known  Date of admission: 2021    Ángela Ba PA-C 21 11:15 PM EST     Subjective   Subjective     Chief Complaint:  AMS    HPI:  Angel Grace is a 50 y.o. male with a past medical history significant for HLD, HTN, CAD, PVD, DM2, CKD, GERD, s/p splenectomy, and ongoing tobacco abuse. He presents today with acute change in mental status from [facility]. Records reviewed indicate that patient typically responds by tracking his eyes and shaking his head yes or no. Apparently he is less responsive and can longer follow commands. Also noted to be hypoxic with oxygen saturations down to mid 80's on room air. HPI limited secondary to patient disposition. There are no complaints of fever, cough, congestion, or syncope. Patient initially presented to [facility] ED on 1/3/21 after being found crawling on all 4's by family. He was severely aphasic with right facial droop. He has a history of CVA X2, most recently in 2020, but was otherwise \"normal, independent, and conversant\" a month ago when he was last seen by family. CT imaging of head on 1/3 showed new region of low attenuation within the left frontal periventricular white matter suggesting ischemia of unknown chronicity. UDS was positive for cocaine at that time. He was subsequently admitted for MRI and evaluation by neurology. Patient improved and was discharged to [facility] from where he presents today with a Keofeed and persistent aphasia.       Current COVID Risks are:  [] Fever []  Cough [] Shortness of breath [] Fatigue [] Change in taste or smell    [] Exposure to COVID positive patient  [x] High risk facility   []  NONE    Review of Systems     All other systems reviewed and are negative.     Personal History     Past Medical History: " "  Diagnosis Date   • CAD (coronary artery disease) 1/9/2021   • CKD (chronic kidney disease) 1/9/2021   • DM2 (diabetes mellitus, type 2) (CMS/[facility]) 1/9/2021   • Essential hypertension 1/9/2021   • GERD without esophagitis 1/9/2021   • History of CVA (cerebrovascular accident) 1/9/2021   • HLD (hyperlipidemia) 1/9/2021       Past Surgical History:   Procedure Laterality Date   • SPLENECTOMY         Family History: family history is not on file. Otherwise pertinent FHx was reviewed and unremarkable.     Social History:  reports that he has been smoking cigarettes. He does not have any smokeless tobacco history on file. Alcohol use questions deferred to the physician. He reports current drug use. Drug: \"Crack\" cocaine.  Social History     Social History Narrative   • Not on file       Medications:       Allergies   Allergen Reactions   • Penicillins Other (See Comments)     unknown       Objective   Objective     Vital Signs:   Temp:  [98 °F (36.7 °C)-100.3 °F (37.9 °C)] 100 °F (37.8 °C)  Heart Rate:  [102-120] 102  Resp:  [16-18] 18  BP: (138-183)/() 138/117  Flow (L/min):  [1-2] 2    Physical Exam   Constitutional: Awake, alert, but disoriented and severely aphasic, unable to respond to commands,   Eyes: PERRLA, sclerae anicteric, no conjunctival injection  HENT: NCAT, mucous membranes moist  Neck: Supple, no thyromegaly, no lymphadenopathy, trachea midline  Respiratory:  nonlabored respirations, crackles in bases bilaterally  Cardiovascular: RRR, no murmurs, rubs, or gallops, palpable pedal pulses bilaterally  Gastrointestinal: Positive bowel sounds, soft, nontender, nondistended  Musculoskeletal: No bilateral ankle edema, no clubbing or cyanosis to extremities  Psychiatric: deffered  Neurologic: strength symmetric in all extremities but overall weak, Cranial Nerves grossly intact to confrontation, nonverbal  Skin: No rashes       Results Reviewed:  I have personally reviewed most recent indicated data and " agree with findings including:  [x]  Laboratory  [x]  Radiology  [x]  EKG/Telemetry  []  Pathology  [x]  Cardiac/Vascular Studies  [x]  Old records  []  Other:  Most pertinent findings include: vitals stable. Potassium 3.4. WBC 18.9. chemistry and hematology otherwise favorable. CTA chest negative for PE but notes bilateral pneumonia. CT imaging of head shows Subacute or old 3 cm infarct in the left corona radiata and there is a smaller area of old ischemic change in the right corona radiata.      LAB RESULTS:      Lab 01/09/21  1825   WBC 18.96*   HEMOGLOBIN 13.7   HEMATOCRIT 39.8   PLATELETS 518*   NEUTROS ABS 13.47*   IMMATURE GRANS (ABS) 0.07*   LYMPHS ABS 3.24*   MONOS ABS 2.00*   EOS ABS 0.08   MCV 89.4   PROCALCITONIN 0.06   LACTATE 0.9   PROTIME 13.1         Lab 01/09/21  1825   SODIUM 138   POTASSIUM 3.4*   CHLORIDE 99   CO2 28.0   ANION GAP 11.0   BUN 7   CREATININE 1.28*   GLUCOSE 83   CALCIUM 8.7         Lab 01/09/21  1825   TOTAL PROTEIN 7.6   ALBUMIN 3.60   GLOBULIN 4.0   ALT (SGPT) 11   AST (SGOT) 16   BILIRUBIN 0.6   ALK PHOS 100         Lab 01/09/21  1825   PROBNP 67.3   TROPONIN T 0.017   PROTIME 13.1   INR 1.02                 Brief Urine Lab Results  (Last result in the past 365 days)      Color   Clarity   Blood   Leuk Est   Nitrite   Protein   CREAT   Urine HCG        01/09/21 1853 Dark Yellow Clear Negative Negative Negative >=300 mg/dL (3+)             Microbiology Results (last 10 days)     Procedure Component Value - Date/Time    COVID-19 and FLU A/B PCR - Swab, Nasopharynx [[id number]]  (Normal) Collected: 01/09/21 1825    Lab Status: Final result Specimen: Swab from Nasopharynx Updated: 01/09/21 2053     COVID19 Not Detected     Influenza A PCR Not Detected     Influenza B PCR Not Detected    Narrative:      Fact sheet for providers: https://www.fda.gov/media/165154/download    Fact sheet for patients: https://www.fda.gov/media/867424/download    Test performed by PCR.          Ct Head  Without Contrast    Result Date: 1/9/2021  CT Head WO HISTORY: Recent stroke, altered mental status today TECHNIQUE: Axial unenhanced head CT. Radiation dose reduction techniques included automated exposure control or exposure modulation based on body size. Count of known CT and cardiac nuc med studies performed in previous 12 months: 0. Time of scan: 11:00 PM COMPARISON: None. FINDINGS: The ventricles and subarachnoid spaces are normal. There is a large area of decreased density in the left anterior corona radiata consistent with a subacute infarct. This measures 3 cm in diameter. There is also old ischemic change in the right posterior corona radiata. No hemorrhage is identified. There are no extra-axial fluid collections.     Impression: Subacute or old 3 cm infarct in the left corona radiata and there is a smaller area of old ischemic change in the right corona radiata. No acute findings are identified.. Signer Name: Hamilton Vaz MD  Signed: 1/9/2021 7:23 PM  Workstation Name: [facility]  [facility]    Ct Chest Without Contrast Diagnostic    Result Date: 1/9/2021  CT Chest WO INDICATION: Hypoxia and upper respiratory congestion today. Recent stroke TECHNIQUE: CT of the thorax without IV contrast. Coronal and sagittal reconstructions were obtained.  Radiation dose reduction techniques included automated exposure control or exposure modulation based on body size. Count of known CT and cardiac nuc med studies performed in previous 12 months: 0. COMPARISON: None available. FINDINGS: There is minimal interstitial infiltrate in the lingula and left lower lobe. Rest the lungs are clear. Thyroid gland is normal. Aorta is normal in size. There is no adenopathy. Upper abdominal images are unremarkable. Feeding tube has its tip in the stomach. Bones are unremarkable.     Impression: Minimal patchy infiltrate in the lingula with mild interstitial prominence in the left base. Otherwise lungs are clear  Signer Name: Hamilton Vaz MD  Signed: 1/9/2021 7:25 PM  Workstation [facility]: [facility]  [facility] [place]    Ct Angiogram Chest    Result Date: 1/9/2021  INDICATION: Shortness of breath generalized weakness evaluate for PE. Patient had earlier noncontrast chest CT for shortness of breath. TECHNIQUE: CT angiogram of the chest with contrast. 3-D reformatted images were acquired and reviewed. Intravenous contrast dose recorded in the patient's chart Radiation dose reduction techniques included automated exposure control or exposure modulation based on body size. Radiation audit for number of CT and nuclear cardiology exams performed in the last year:  1. There is limitation of study by the patient's inability to raise arms resulting in artifact. COMPARISON: There is an earlier noncontrast chest CT which is not adequate to evaluate for suspected pulmonary embolism. FINDINGS: There is no convincing evidence for pulmonary embolus. The more peripheral pulmonary arterial system is suboptimally assessed due to some breathing motion artifact and streak artifact from patient arms. There is no evidence for thoracic aortic dissection. There is a feeding tube in the stomach. There is no axillary lymphadenopathy. There are shotty mediastinal lymph nodes and mild thickening of central bronchovascular soft tissues bilaterally. There is no pleural or pericardial effusion. There is no pneumothorax. There is patchy airspace disease in the right upper lobe posteriorly. There is mild multifocal patchy airspace disease in the left greater the right lower lobe and in the lingula. Please correlate for clinical evidence of Covid pneumonia. This is mildly progressed from prior. Imaging features can be seen with COVID-19 pneumonia, although are nonspecific and can can occur with a variety of infectious and noninfectious processes. . Follow-up recommended. The patient is postcholecystectomy. There is streak artifact from contrast  in the visualized colon.     Impression: 1. No convincing evidence for pulmonary embolus. There is some technical limitation for evaluation of the peripheral pulmonary arterial vessels. 2. Patchy bilateral airspace disease is mildly progressed from earlier. 3. Mild shotty mediastinal lymphadenopathy is nonspecific. Signer Name: Ines Lucas MD  Signed: 1/9/2021 9:29 PM  Workstation Name: [email or web address]  [facility] [place]           Assessment/Plan   Assessment & Plan       Acute respiratory failure with hypoxia (CMS/HCC)    Sepsis due to pneumonia (CMS/HCC)    APRIL (acute kidney injury) (CMS/HCC)    AMS (altered mental status)    Hypokalemia    DM2 (diabetes mellitus, type 2) (CMS/HCC)    CAD (coronary artery disease)    CKD (chronic kidney disease)    Essential hypertension    HLD (hyperlipidemia)    History of CVA (cerebrovascular accident)    GERD without esophagitis    Tobacco abuse    Angel Grace is a 50 y.o. male with a past medical history significant for HLD, HTN, CAD, PVD, DM2, CKD, GERD, s/p splenectomy, and ongoing tobacco abuse with recent stroke, at [facility] for rehab - they sent him to ED for hypoxia, unresponsiveness.    1. Acute Respiratory Failure with Hypoxia:  - secondary to pneumonia with sepsis. COVID PCR negative  - concern for aspiration with KeoFeed, requiring freqeunt suction   - administered Levaquin in ED. Will change to merrem and vancomcyin  - obtain blood cultures, sputum culture, strep pneumo  - scheduled duo nebs  - additional pulmonary toilet as needed  - am labs    2. APRIL:  - history CKD. IVF x 12 hours  - avoid additional nephrotoxins    3. AMS- recent stroke  - imaging shows subacute stroke from recent admission  - will consider repeating MRI and need for neurology involvement  - repeat tox screen  - PT/OT/SLP  - nutrition consult  - resume medications; on statin, ASA, and plavix  -on tube feeds-  - need ACP discussions!    4. Hypokalemia:  - replace as needed per  "protocol  - no EKG changes  - check magnesium    5. DM2:  - uncontrolled. Recent A1c 9.3  - hold oral hypoglycemics  - start SSI as needed. Regular accu checks    6. Hypertension:  - controlled and stable  - continue home meds as tolerated    DVT prophylaxis:  ROLANDO      CODE STATUS:  Full code  Code Status and Medical Interventions:   Ordered at: 01/09/21 2307     Level Of Support Discussed With:    Patient     Code Status:    CPR     Medical Interventions (Level of Support Prior to Arrest):    Full       Electronically signed by Ángela Ba PA-C, 01/09/21, 11:15 PM EST.  Attending   Admission Attestation       I have seen and examined the patient, performing an independent face-to-face diagnostic evaluation with plan of care reviewed and developed with the advanced practice clinician (APC).      Brief Summary Statement:     Angel Grace is a 50 y.o. male with a past medical history significant for HLD, HTN, CAD, PVD, DM2, CKD, GERD, s/p splenectomy, and ongoing tobacco abuse with recent stroke, at [facility] for rehab - they sent him to ED for hypoxia, unresponsiveness.  Patient unable to provide any history  Remainder of detailed HPI is as noted by APC and has been reviewed and/or edited by me for completeness.    Attending Physical Exam:  BP (!) 170/103   Pulse 102   Temp 100.3 °F (37.9 °C) (Axillary)   Resp 16   Ht 188 cm (74\")   Wt 108 kg (237 lb 9.6 oz)   SpO2 97%   BMI 30.51 kg/m²     Patient is alert , aphasic has keofeed in place  Neck is without mass or JVD  Heart is Reg wo murmur  Lungs are clear wo wheeze or crackle  Abdomen is soft without HSM or mass, not tender or distended  Moves left arm and leg  Skin is without rash  Neurologic exam is grossly abnormal  Mood is sleepy but moves around in the bed    Brief Assessment/Plan :  See detailed assessment and plan developed with APC which I have reviewed and/or edited for completeness.    I believe this patient meets INPATIENT status due to acute " hypoxia- probable aspiration pneumonia.  I feel patient’s risk for adverse outcomes and need for care warrant INPATIENT evaluation and I predict the patient’s care encounter to likely last beyond 2 midnights.      Electronically signed by Purvi Cardona MD, 01/10/21, 3:15 AM EST.

## 2021-01-10 NOTE — ED PROVIDER NOTES
Subjective   Pt is a 51 yo female presenting to ED by EMS with reports of AMS. PMHx significant for CVA, CAD, PVD, CKD, DM (insulin), HTN, HLD, GERD, Noncompliance and Drug use (cocaine). Pt sent to ED from [facility] after staff found patient altered than baseline. Report that patient wasn't tracking with his eyes or shaking his head in response to questions. On arrival to ED patient back to reported baseline of nodding head and following with eyes in response to questions. Per records patient presented to [facility] with stroke symptoms on 1-2-2021 and has residual right sided deficits and aphasia. Pt also noted to be hypoxic with O2 saturations in the mid 80's on RA. Pt does not wear O2 chronically. Pt unable to provide history. No reports from [facility] of recent fever, cough, SOB, vomiting or diarrhea. No reports of recent positive Covid testing.       History provided by:  Medical records, EMS personnel and nursing home      Review of Systems   Unable to perform ROS: Patient nonverbal   Constitutional: Negative for fever.   Respiratory: Negative for cough and shortness of breath.    Gastrointestinal: Negative for diarrhea and vomiting.   Psychiatric/Behavioral: Positive for confusion.       No past medical history on file.    Allergies   Allergen Reactions   • Penicillins Other (See Comments)     unknown       No past surgical history on file.    No family history on file.    Social History     Socioeconomic History   • Marital status: Single     Spouse name: Not on file   • Number of children: Not on file   • Years of education: Not on file   • Highest education level: Not on file           Objective   Physical Exam  Vitals signs and nursing note reviewed.   Constitutional:       General: He is not in acute distress.  HENT:      Nose:      Comments: Feeding tube in place    Eyes:      Extraocular Movements: Extraocular movements intact.      Conjunctiva/sclera: Conjunctivae normal.      Pupils:  Pupils are equal, round, and reactive to light.   Neck:      Musculoskeletal: Neck supple.   Cardiovascular:      Rate and Rhythm: Tachycardia present.   Pulmonary:      Effort: Pulmonary effort is normal.      Breath sounds: Rhonchi (upper airway) present.   Abdominal:      Palpations: Abdomen is soft.      Tenderness: There is no abdominal tenderness.   Musculoskeletal:         General: No swelling.   Skin:     General: Skin is warm.   Neurological:      Mental Status: He is alert.      Comments: Aphasic   Does not follow commands but tracks with his eyes and attempts to nod his head  Unable to move UE or LE.          Procedures           ED Course  ED Course as of Jan 09 2205   Sat Jan 09, 2021 1846 WBC(!): 18.96 [RT]   1856 ED staff has attempted multiple times to obtain records from [facility]. Left VM to call back to  3 times. Medical records closed at [place].     [RT]   1955 Discussed patient with Dr. Shah. Will obtain CTA to r/o PE due to acute hypoxia with recent medical hx.     [RT]   2024 Procalcitonin: 0.06 [RT]   2024 Lactate: 0.9 [RT]   2143 COVID19: Not Detected [RT]   2145 Obtained discharged summary / records from [facility] from [place] admission. Placed in ED room for admission.    [RT]   2150 CTA chest negative for PE but with bilateral pulmonary infiltrates. Will initiate Levaquin in ED.     [RT]   2200 Dr. Shah discussed admission with hospitalist Dr. Cardona    [RT]      ED Course User Index  [RT] Ayesha Banerjee PA      Re-examined patient and attempted to discuss results with plan for admission.     Patient maintaining O2 above 90% on 2 L.     Reviewed old records.       Recent Results (from the past 24 hour(s))   Comprehensive Metabolic Panel    Collection Time: 01/09/21  6:25 PM    Specimen: Blood   Result Value Ref Range    Glucose 83 65 - 99 mg/dL    BUN 7 6 - 20 mg/dL    Creatinine 1.28 (H) 0.76 - 1.27 mg/dL    Sodium 138 136 - 145 mmol/L    Potassium 3.4 (L)  3.5 - 5.2 mmol/L    Chloride 99 98 - 107 mmol/L    CO2 28.0 22.0 - 29.0 mmol/L    Calcium 8.7 8.6 - 10.5 mg/dL    Total Protein 7.6 6.0 - 8.5 g/dL    Albumin 3.60 3.50 - 5.20 g/dL    ALT (SGPT) 11 1 - 41 U/L    AST (SGOT) 16 1 - 40 U/L    Alkaline Phosphatase 100 39 - 117 U/L    Total Bilirubin 0.6 0.0 - 1.2 mg/dL    eGFR  African Amer 72 >60 mL/min/1.73    Globulin 4.0 gm/dL    A/G Ratio 0.9 g/dL    BUN/Creatinine Ratio 5.5 (L) 7.0 - 25.0    Anion Gap 11.0 5.0 - 15.0 mmol/L   Protime-INR    Collection Time: 01/09/21  6:25 PM    Specimen: Blood   Result Value Ref Range    Protime 13.1 11.5 - 14.0 Seconds    INR 1.02 0.85 - 1.16   COVID-19 and FLU A/B PCR - Swab, Nasopharynx    Collection Time: 01/09/21  6:25 PM    Specimen: Nasopharynx; Swab   Result Value Ref Range    COVID19 Not Detected Not Detected - Ref. Range    Influenza A PCR Not Detected Not Detected    Influenza B PCR Not Detected Not Detected   BNP    Collection Time: 01/09/21  6:25 PM    Specimen: Blood   Result Value Ref Range    proBNP 67.3 0.0 - 900.0 pg/mL   Troponin    Collection Time: 01/09/21  6:25 PM    Specimen: Blood   Result Value Ref Range    Troponin T 0.017 0.000 - 0.030 ng/mL   Lactic Acid, Plasma    Collection Time: 01/09/21  6:25 PM    Specimen: Blood   Result Value Ref Range    Lactate 0.9 0.5 - 2.0 mmol/L   CBC Auto Differential    Collection Time: 01/09/21  6:25 PM    Specimen: Blood   Result Value Ref Range    WBC 18.96 (H) 3.40 - 10.80 10*3/mm3    RBC 4.45 4.14 - 5.80 10*6/mm3    Hemoglobin 13.7 13.0 - 17.7 g/dL    Hematocrit 39.8 37.5 - 51.0 %    MCV 89.4 79.0 - 97.0 fL    MCH 30.8 26.6 - 33.0 pg    MCHC 34.4 31.5 - 35.7 g/dL    RDW 11.2 (L) 12.3 - 15.4 %    RDW-SD 36.1 (L) 37.0 - 54.0 fl    MPV 10.9 6.0 - 12.0 fL    Platelets 518 (H) 140 - 450 10*3/mm3    Neutrophil % 71.1 42.7 - 76.0 %    Lymphocyte % 17.1 (L) 19.6 - 45.3 %    Monocyte % 10.5 5.0 - 12.0 %    Eosinophil % 0.4 0.3 - 6.2 %    Basophil % 0.5 0.0 - 1.5 %    Immature  Grans % 0.4 0.0 - 0.5 %    Neutrophils, Absolute 13.47 (H) 1.70 - 7.00 10*3/mm3    Lymphocytes, Absolute 3.24 (H) 0.70 - 3.10 10*3/mm3    Monocytes, Absolute 2.00 (H) 0.10 - 0.90 10*3/mm3    Eosinophils, Absolute 0.08 0.00 - 0.40 10*3/mm3    Basophils, Absolute 0.10 0.00 - 0.20 10*3/mm3    Immature Grans, Absolute 0.07 (H) 0.00 - 0.05 10*3/mm3    nRBC 0.0 0.0 - 0.2 /100 WBC   Light Blue Top    Collection Time: 01/09/21  6:25 PM   Result Value Ref Range    Extra Tube hold for add-on    Green Top (Gel)    Collection Time: 01/09/21  6:25 PM   Result Value Ref Range    Extra Tube Hold for add-ons.    Lavender Top    Collection Time: 01/09/21  6:25 PM   Result Value Ref Range    Extra Tube hold for add-on    Gold Top - SST    Collection Time: 01/09/21  6:25 PM   Result Value Ref Range    Extra Tube Hold for add-ons.    Procalcitonin    Collection Time: 01/09/21  6:25 PM    Specimen: Blood   Result Value Ref Range    Procalcitonin 0.06 0.00 - 0.25 ng/mL   Urinalysis With Culture If Indicated - Urine, Catheter    Collection Time: 01/09/21  6:53 PM    Specimen: Urine, Catheter   Result Value Ref Range    Color, UA Dark Yellow (A) Yellow, Straw    Appearance, UA Clear Clear    pH, UA <=5.0 5.0 - 8.0    Specific Gravity, UA 1.027 1.001 - 1.030    Glucose, UA Negative Negative    Ketones, UA 15 mg/dL (1+) (A) Negative    Bilirubin, UA Negative Negative    Blood, UA Negative Negative    Protein, UA >=300 mg/dL (3+) (A) Negative    Leuk Esterase, UA Negative Negative    Nitrite, UA Negative Negative    Urobilinogen, UA 1.0 E.U./dL 0.2 - 1.0 E.U./dL   Urinalysis, Microscopic Only - Urine, Catheter    Collection Time: 01/09/21  6:53 PM    Specimen: Urine, Catheter   Result Value Ref Range    RBC, UA 0-2 None Seen, 0-2 /HPF    WBC, UA 0-2 None Seen, 0-2 /HPF    Bacteria, UA None Seen None Seen, Trace /HPF    Squamous Epithelial Cells, UA 0-2 None Seen, 0-2 /HPF    Hyaline Casts, UA 7-12 0 - 6 /LPF    Methodology Automated  Microscopy      Note: In addition to lab results from this visit, the labs listed above may include labs taken at another facility or during a different encounter within the last 24 hours. Please correlate lab times with ED admission and discharge times for further clarification of the services performed during this visit.    CT Angiogram Chest   Final Result      1. No convincing evidence for pulmonary embolus. There is some technical limitation for evaluation of the peripheral pulmonary arterial vessels.   2. Patchy bilateral airspace disease is mildly progressed from earlier.   3. Mild shotty mediastinal lymphadenopathy is nonspecific.      Signer Name: Ines Lucas MD    Signed: 1/9/2021 9:29 PM    Workstation Name: [facility]      CT Head Without Contrast   Final Result   Subacute or old 3 cm infarct in the left corona radiata and there is a smaller area of old ischemic change in the right corona radiata. No acute findings are identified..               Signer Name: Hamilton Vaz MD    Signed: 1/9/2021 7:23 PM    Workstation Name: [facility]     Radiology [facility]      CT CHEST WITHOUT CONTRAST DIAGNOSTIC   Final Result   Minimal patchy infiltrate in the lingula with mild interstitial prominence in the left base. Otherwise lungs are clear      Signer Name: Hamilton Vaz MD    Signed: 1/9/2021 7:25 PM    Workstation Name: [facility]     [facility]        Vitals:    01/09/21 1930 01/09/21 2000 01/09/21 2030 01/09/21 2130   BP: (!) 150/101 (!) 151/107 166/93 150/78   BP Location: Right arm Right arm Right arm Right arm   Patient Position: Lying Lying Lying Lying   Pulse: 111 118 120 114   Resp: 18 16 18 16   Temp:       TempSrc:       SpO2: 99% 96% 94% 93%   Weight:       Height:         Medications   sodium chloride 0.9 % flush 10 mL (has no administration in time range)   lactated ringers bolus 1,000 mL (1,000 mL Intravenous New Bag  1/9/21 2153)   levoFLOXacin (LEVAQUIN) 750 mg/150 mL D5W (premix) (LEVAQUIN) 750 mg (has no administration in time range)   iopamidol (ISOVUE-370) 76 % injection 100 mL (85 mL Intravenous Given 1/9/21 2100)     ECG/EMG Results (last 24 hours)     Procedure Component Value Units Date/Time    ECG 12 Lead [[id number]] Collected: 01/09/21 1810     Updated: 01/09/21 1811        ECG 12 Lead               COVID-19 RISK SCREEN     1. Has the patient had close contact without PPE with a lab confirmed COVID-19 (+) person or a person under investigation (PUI) for COVID-19 infection?  -- No     2. Has the patient had respiratory symptoms, worsened/new cough and/or SOA, unexplained fever, or sudden loss of smell and/or taste in the past 7 days? --  Yes    3. Does the patient have baseline higher exposure risk such as working in healthcare field, currently residing in healthcare facility, or ongoing hemodialysis?  --  Yes                                       MDM    Final diagnoses:   Hypoxia   Pneumonia of both lungs due to infectious organism, unspecified part of lung   Recent cerebrovascular accident (CVA)   Altered mental status, unspecified altered mental status type            Ayesha Banerjee PA  01/09/21 3010

## 2021-01-10 NOTE — PROGRESS NOTES
Adult Nutrition  Assessment/PES    Patient Name:  Angel Grace  YOB: 1970  MRN: [id number]  Admit Date:  1/9/2021    Assessment Date:  1/10/2021      Reason for Assessment     Row Name 01/10/21 1142 01/10/21 1125       Reason for Assessment    Reason For Assessment   Pt has keofeed FT in place. RD attempted to interview pt in room but pt not able to respond (noted aphasia) and no one else with pt; noted no emergency contact name/number listed in EMR at this time.  TF/PN   45 mins    Diagnosis  --  -- ARF with hypoxia, AMS, TOB, aphasia, PNA with sepsis. H/o DM, APRIL, HTN, CAD, PVD, CKD, GERD, CVA.          Anthropometrics     Row Name 01/10/21 1127          Admit Weight    Admit Weight  -- ht=74in, uw=153nt; BMI=30.5         Labs/Tests/Procedures/Meds     Row Name 01/10/21 1128          Labs/Procedures/Meds    Lab Results Reviewed  reviewed           Estimated/Assessed Needs     Row Name 01/10/21 1140          Estimated/Assessed Needs    Additional Documentation  -- Est juan j needs: [id number] calories/d based on 25-30cal/kg IBW of 190lb. Est pro needs: 108-130g/d based on 1.25-1.5g pro/kg IBW (this is also equivalent to 1.0-1.2g/kg actual body wt).         Nutrition Prescription Ordered     Row Name 01/10/21 1127          Nutrition Prescription PO    Current PO Diet  NPO                 Problem/Interventions:  Problem 1     Row Name 01/10/21 1143          Nutrition Diagnoses Problem 1    Problem 1  Needs Alternate Route     Etiology (related to)  -- unknown etiology per data avail at this time, however RD noted h/o CVA x2 and keofeed in place on adm.     Signs/Symptoms (evidenced by)  NPO               Intervention Goal     Row Name 01/10/21 1145          Intervention Goal    PO  Initiate feeding           Nutrition Prescription     Row Name 01/10/21 1146          Nutrition Prescription EN    Enteral Prescription  Enteral begin/change     Product  Diabetisource     TF Delivery Method  Continuous      Continuous TF Goal Rate (mL/hr)  90 mL/hr x22h/d.     Continuous TF Starting Rate (mL/hr)  25 mL/hr     Continuous TF Goal Volume (mL)  1980 mL At goal rate of delivery, TF provides 2376cal/119g pro/1624ml TF fw/2284ml total fw/d and meets 100% estimated needs..     Water flush (mL)   30 mL x22h/d. FW recs for use when continuous IV fluids are not being administered.     Water Flush Frequency  Per hour     New EN Prescription Ordered?  Yes         Education/Evaluation     Row Name 01/10/21 1146          Monitor/Evaluation    Monitor  Per protocol           Electronically signed by:  Rashmi Pak, MS,RD,LD  01/10/21 11:55 EST

## 2021-01-10 NOTE — SIGNIFICANT NOTE
01/10/21 0919   SLP Deferred Reason   SLP Deferred Reason Unable to evaluate, medical status change  (Pt not appropriate for eval at this time, will f/u tomorrow for status.)

## 2021-01-10 NOTE — PLAN OF CARE
Goal Outcome Evaluation:  Plan of Care Reviewed With: patient  Progress: no change  Outcome Summary: oriented to self only, difficult to assess due to patient's nonverbal state. Restraints in place at 0800 due to pulling at lines and tubes. Patient room moved to a room closer to nurse's station. Restraints taken off for a few minutes after patient was moved closer to station. Keofeed dislodged and unable to advance, consequently pulled it out. Contacted physician, charge nurse, and 2H nurse for reinsertion. Suctioned several times with yaunker and contacted RT to deep suction due to copious amounts of secretions. Restraints back in place after keofeed was dislodged. Sister called to check the status of patient and advised her that Dr. Dow would be calling her to get a little more information about patient. Patient currenlty resting in bed watching TV. Denies any c/o pain or further needs at this time. Awaiting keofeed reinsertion. [facility]
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
## 2021-01-11 NOTE — NURSING NOTE
Pt alert to self only, can follow one step commands to suction self when needed, new EKG showed a new onset of PVC's and a BBB, 2B ICU nurse Lynette HERNANDEZ to bedside to reanchore a HARPREET feed at 2306. I ordered a  KUB at 2311, resulted at 0054 requiring additional modifications to access placement. Oliva Wang APRN updated at 0318, call to 2B ICU nurse Lynette HERNANDEZ for assistance to readjust, assistance pending. Call to 2B ICU Lynette HERNANDEZ. to confirm readjustment. She refered me to call 2H ICU for further assistance. 2H ICU notified, assistance pending. Referred to follow up with charge nurse. Charge nurse notified. [facility].

## 2021-01-11 NOTE — PLAN OF CARE
Goal Outcome Evaluation:  Plan of Care Reviewed With: patient  Progress: no change     SLP evaluation completed. Will address communication & dysphagia. Please see note for further details and recommendations.

## 2021-01-11 NOTE — NURSING NOTE
Daily low Raymond check. Patient's Raymond today is 13. All interventions in place and documented per protocol. No concerns noted per staff. Patient has history of agitation-feel waffle overlay mattress is more appropriate as specialty bed surfaces may too slippery and adverse to patient postioning. Please notify WOC for any questions.

## 2021-01-11 NOTE — PROGRESS NOTES
[facility] Medicine Services  PROGRESS NOTE    Patient Name: Angel Grace  : 1970  MRN: [id number]    Date of Admission: 2021  Primary Care Physician: Provider, No Known    Subjective   Subjective     CC:  Follow-up for pneumonia    HPI:  Patient in four-point restraints, nonverbal, eyes open spontaneously and he is tracking, but does not follow commands    ROS:  Unable to obtain due to patient's mental status    Objective   Objective     Vital Signs:   Temp:  [96.4 °F (35.8 °C)-100.2 °F (37.9 °C)] 99.5 °F (37.5 °C)  Heart Rate:  [] 98  Resp:  [10-20] 20  BP: (153-189)/() 178/114  Total (NIH Stroke Scale): 23     Physical Exam:  Constitutional: No acute distress, awake, alert  HENT: NCAT, mucous membranes moist, has Keofeed in place   respiratory: Has rhonchi bilaterally, respiratory effort normal nasal cannula  Cardiovascular: RRR, no murmurs, rubs, or gallops  Gastrointestinal: Positive bowel sounds, soft, nontender, nondistended  Neurologic:  moves all extremities, nonverbal  Skin: No rashes    Results Reviewed:  Results from last 7 days   Lab Units 21  0635 01/10/21  0741 21  1825   WBC 10*3/mm3 19.37* 23.38* 18.96*   HEMOGLOBIN g/dL 13.3 13.0 13.7   HEMATOCRIT % 39.9 38.1 39.8   PLATELETS 10*3/mm3 536* 499* 518*   INR   --   --  1.02   PROCALCITONIN ng/mL  --   --  0.06     Results from last 7 days   Lab Units 21  0635 01/10/21  0741 21  1825   SODIUM mmol/L 135* 136 138   POTASSIUM mmol/L 3.9 3.7 3.4*   CHLORIDE mmol/L 98 100 99   CO2 mmol/L 25.0 27.0 28.0   BUN mg/dL 7 8 7   CREATININE mg/dL 0.96 1.15 1.28*   GLUCOSE mg/dL 168* 140* 83   CALCIUM mg/dL 8.6 8.5* 8.7   ALT (SGPT) U/L  --   --  11   AST (SGOT) U/L  --   --  16   TROPONIN T ng/mL  --   --  0.017   PROBNP pg/mL  --   --  67.3     Estimated Creatinine Clearance: 119.1 mL/min (by C-G formula based on SCr of 0.96 mg/dL).    Microbiology Results Abnormal     Procedure Component  Value - Date/Time    Blood Culture - Blood, Arm, Right [[id number]] Collected: 01/09/21 1820    Lab Status: Preliminary result Specimen: Blood from Arm, Right Updated: 01/10/21 1846     Blood Culture No growth at 24 hours    Blood Culture - Blood, Hand, Left [[id number]] Collected: 01/09/21 1823    Lab Status: Preliminary result Specimen: Blood from Hand, Left Updated: 01/10/21 1846     Blood Culture No growth at 24 hours    MRSA Screen, PCR (Inpatient) - Swab, Nares [[id number]]  (Normal) Collected: 01/10/21 0052    Lab Status: Final result Specimen: Swab from Nares Updated: 01/10/21 0802     MRSA PCR Negative    Narrative:      MRSA Negative    COVID-19 and FLU A/B PCR - Swab, Nasopharynx [[id number]]  (Normal) Collected: 01/09/21 1825    Lab Status: Final result Specimen: Swab from Nasopharynx Updated: 01/09/21 2053     COVID19 Not Detected     Influenza A PCR Not Detected     Influenza B PCR Not Detected    Narrative:      Fact sheet for providers: https://www.fda.gov/media/894768/download    Fact sheet for patients: https://www.fda.gov/media/577483/download    Test performed by PCR.          Imaging Results (Last 24 Hours)     Procedure Component Value Units Date/Time    XR Abdomen KUB [[id number]] Collected: 01/11/21 0146     Updated: 01/11/21 0148    Narrative:      CR Abdomen 1 Vw    INDICATION:   Feeding tube placement.    COMPARISON:   1/10/2021    FINDINGS:  AP view of the abdomen. Feeding tube tip is in the antrum of the stomach. Bowel gas pattern is nonspecific. There is contrast in the colon, and there is colonic diverticulosis.      Impression:      Feeding tube tip in the antrum of the stomach.    Signer Name: Eddie Herrno MD   Signed: 1/11/2021 1:46 AM   Workstation Name: [email or web address]    [facility]               I have reviewed the medications:  Scheduled Meds:aspirin, 81 mg, Nasogastric, Daily  atorvastatin, 80 mg, Nasogastric, Nightly  carvedilol, 12.5 mg, Nasogastric, BID  With Meals  clopidogrel, 75 mg, Nasogastric, Daily  sennosides, 10 mL, Oral, BID    And  docusate sodium, 100 mg, Oral, BID  escitalopram, 10 mg, Nasogastric, Nightly  gabapentin, 400 mg, Nasogastric, TID  heparin (porcine), 5,000 Units, Subcutaneous, Q12H  losartan, 50 mg, Oral, Q24H  meropenem, 1 g, Intravenous, Q8H  pantoprazole, 40 mg, Oral, Q AM  sodium chloride, 10 mL, Intravenous, Q12H      Continuous Infusions:   PRN Meds:.•  acetaminophen  •  ipratropium-albuterol  •  melatonin  •  Morphine  •  ondansetron  •  oxyCODONE-acetaminophen  •  polyethylene glycol  •  [COMPLETED] Insert peripheral IV **AND** sodium chloride  •  sodium chloride    Assessment/Plan   Assessment & Plan     Active Hospital Problems    Diagnosis  POA   • **Acute respiratory failure with hypoxia (CMS/[facility]) [J96.01]  Yes   • Sepsis due to pneumonia (CMS/[facility]) [J18.9, A41.9]  Yes   • Hypokalemia [E87.6]  Yes   • APRIL (acute kidney injury) (CMS/[facility]) [N17.9]  Yes   • History of CVA (cerebrovascular accident) [Z86.73]  Not Applicable   • GERD without esophagitis [K21.9]  Yes   • DM2 (diabetes mellitus, type 2) (CMS/[facility]) [E11.9]  Yes   • CAD (coronary artery disease) [I25.10]  Yes   • CKD (chronic kidney disease) [N18.9]  Yes   • Tobacco abuse [Z72.0]  Yes   • AMS (altered mental status) [R41.82]  Yes   • Essential hypertension [I10]  Yes   • HLD (hyperlipidemia) [E78.5]  Yes      Resolved Hospital Problems   No resolved problems to display.        Brief Hospital Course to date:  Angel Grace is a 50 y.o. male With a PMH of HLD, HTN, CAD, PVD, T2DM, CKD, GERD, s/p splenectomy, and ongoing tobacco abuse with recent CVA who was admitted on 1/9/21 from [facility] for hypoxia and unresponsiveness.     Patient and problems new to me today     Acute respiratory failure with hypoxia   Sepsis  (tachycardia and leukocytosis)  -CTA chest without PE, has patchy bilateral airspace disease, Covid negative   -Continue supplemental oxygen and nebs as needed, MetaNeb  every 4 hours  -Continue nasotracheal suctioning  -Strep pneumo urine antigens, negative blood cultures pending  -Continue merrem and d/c vancomycin, MRSA nares negative      APRIL-resolved  -CTM BMP      AMS and recent CVA with residual dysphagia on tube feeds   -KUB with NG tube in stomach   -Continue tube feeds, consult nutrition   -Tox screen +cocaine  -Resume aspirin and Plavix , and statin per NG tube     Hypokalemia   -Replete per protocol     T2DM  -Continue sliding scale insulin     HTN  -Resume Coreg per NG tube, hold losartan due to APRIL     GERD - resume PPI     DVT Prophylaxis:  heparin     Disposition: I expect the patient to be discharged pending improvement in AMS, will likely need placement.  Discussed plan of care with his sister Mile Arguello, she would like to be updated daily.    CODE STATUS:   Code Status and Medical Interventions:   Ordered at: 01/09/21 6154     Level Of Support Discussed With:    Patient     Code Status:    CPR     Medical Interventions (Level of Support Prior to Arrest):    Full       Margaret Dow MD  01/11/21

## 2021-01-11 NOTE — THERAPY EVALUATION
Acute Care - Speech Language Pathology Initial Evaluation  [facility]   Cognitive-Communication Evaluation  Clinical Swallow Evaluation     Patient Name: Angel Grace  : 1970  MRN: [id number]  Today's Date: 2021               Admit Date: 2021     Visit Dx:    ICD-10-CM ICD-9-CM   1. Hypoxia  R09.02 799.02   2. Pneumonia of both lungs due to infectious organism, unspecified part of lung  J18.9 483.8   3. Recent cerebrovascular accident (CVA)  Z86.73 V49.89   4. Altered mental status, unspecified altered mental status type  R41.82 780.97     Patient Active Problem List   Diagnosis   • Sepsis due to pneumonia (CMS/[facility])   • Hypokalemia   • APRIL (acute kidney injury) (CMS/[facility])   • History of CVA (cerebrovascular accident)   • GERD without esophagitis   • DM2 (diabetes mellitus, type 2) (CMS/[facility])   • CAD (coronary artery disease)   • CKD (chronic kidney disease)   • Acute respiratory failure with hypoxia (CMS/[facility])   • Tobacco abuse   • AMS (altered mental status)   • Essential hypertension   • HLD (hyperlipidemia)     Past Medical History:   Diagnosis Date   • CAD (coronary artery disease) 2021   • CKD (chronic kidney disease) 2021   • DM2 (diabetes mellitus, type 2) (CMS/[facility]) 2021   • Essential hypertension 2021   • GERD without esophagitis 2021   • History of CVA (cerebrovascular accident) 2021   • HLD (hyperlipidemia) 2021     Past Surgical History:   Procedure Laterality Date   • SPLENECTOMY          SLP EVALUATION (last 72 hours)      SLP SLC Evaluation     Row Name 21 1030                   Communication Assessment/Intervention    Document Type  evaluation  -MP        Subjective Information  no complaints  -MP        Patient Observations  alert;cooperative  -MP        Patient/Family/Caregiver Comments/Observations  No family present. Pt in soft wrist restraints  -MP        Care Plan Review  evaluation/treatment results reviewed;patient/other agree to care plan   -MP        Patient Effort  adequate  -MP           General Information    Patient Profile Reviewed  yes  -MP        Pertinent History Of Current Problem  Pt adm fr [facility] 1/9 w/ AMS, acute respiratory failure w/ hypoxia. PNA w/ sepsis, concern for aspiration. Had keofeed when arrived from [facility]. Presented to [facility] 1/3 w/ R facial droop, severe aphasia, UDS + cocaine. Per notes, pt typically responds by tracking eyes & nodding/shaking head Y/N. Hx CVAx2 - most recent Oct 2020, HLD, HTN, CAD, PVD, DM2, CKD, GERD, s/p splenectomy, tobacco use.  -MP        Precautions/Limitations, Vision  WFL;for purposes of eval  -MP        Precautions/Limitations, Hearing  WFL;for purposes of eval  -MP        Prior Level of Function-Communication  expressive language impairment;other (see comments) per notes, pt eye tracks & nods/shakes head  -MP        Plans/Goals Discussed with  patient;agreed upon  -MP        Barriers to Rehab  medically complex;previous functional deficit  -MP        Patient's Goals for Discharge  patient did not state  -MP           Pain    Additional Documentation  Pain Scale: FACES Pre/Post-Treatment (Group)  -MP           Pain Scale: FACES Pre/Post-Treatment    Pain: FACES Scale, Pretreatment  0-->no hurt  -MP        Posttreatment Pain Rating  0-->no hurt  -MP           Comprehension Assessment/Intervention    Comprehension Assessment/Intervention  Auditory Comprehension  -MP           Auditory Comprehension Assessment/Intervention    Auditory Comprehension (Communication)  moderate impairment  -MP        Able to Identify Objects/Pictures (Communication)  pictures of common objects;moderate impairment  -MP        Answers Questions (Communication)  yes/no;simple;moderate impairment;other (see comments) inconsistent Y/N  -MP        Able to Follow Commands (Communication)  1-step;mild impairment  -MP        Auditory Comprehension Communication, Comment  Picture board in pt's room when SLP arrived, but  unreliable when attempted to use  -MP           Expression Assessment/Intervention    Expression Assessment/Intervention  verbal expression  -MP           Verbal Expression Assessment/Intervention    Verbal Expression  severe impairment  -MP        Automatic Speech (Communication)  response to greeting;severe impairment  -MP        Repetition  sounds;severe impairment  -MP        Confrontational Naming  high frequency;severe impairment  -MP           Oral Motor Structure and Function    Oral Motor Structure and Function  WFL  -MP        Dentition Assessment  natural, present and adequate  -MP           Oral Musculature and Cranial Nerve Assessment    Oral Motor General Assessment  oral labial or buccal impairment;other (see comments) u/a to fully assess  -MP        Oral Labial or Buccal Impairment, Detail, Cranial Nerve VII (Facial):  right labial droop  -MP           Motor Speech Assessment/Intervention    Motor Speech Function  unable/difficult to assess;other (see comments) no verbalizations  -MP           Cognitive Assessment Intervention- SLP    Cognitive Function (Cognition)  unable/difficult to assess;other (see comments) 2' severity of language deficits  -MP           SLP Clinical Impressions    SLP Diagnosis  Moderate receptive language deficits & severe expressive language deficits  -MP        Rehab Potential/Prognosis  good  -MP        SLC Criteria for Skilled Therapy Interventions Met  yes  -MP        Functional Impact  difficulty communicating wants, needs;difficulty communicating in an emergency;restrictions in personal and social life  -MP           Recommendations    Therapy Frequency (SLP SLC)  5 days per week  -MP        Predicted Duration Therapy Intervention (Days)  until discharge  -MP        Anticipated Discharge Disposition (SLP)  inpatient rehabilitation facility;anticipate therapy at next level of care  -MP          User Key  (r) = Recorded By, (t) = Taken By, (c) = Cosigned By    Initials  Name Effective Dates    MP Magan Mooney, MS CCC-SLP 06/19/19 -              EDUCATION  The patient has been educated in the following areas:     Communication Impairment.    SLP Recommendation and Plan  SLP Diagnosis: Moderate receptive language deficits & severe expressive language deficits        Swallow Criteria for Skilled Therapeutic Interventions Met: demonstrates skilled criteria  SLC Criteria for Skilled Therapy Interventions Met: yes  Anticipated Discharge Disposition (SLP): inpatient rehabilitation facility, anticipate therapy at next level of care        Predicted Duration Therapy Intervention (Days): until discharge                   Plan of Care Reviewed With: patient      SLP GOALS     Row Name 01/11/21 1040             Words/Phrases/Sentences Goal 1 (SLP)    Improve Ability to Comprehend Words/Phrases/Sentences Through: Goal 1 (SLP)  identify objects, field of;other (comment);80%;with minimal cues (75-90%) 2  -MP      Time Frame (Identify Objects and Pictures Goal 1, SLP)  short term goal (STG)  -MP         Comprehend Questions Goal 1 (SLP)    Improve Ability to Comprehend Questions Goal 1 (SLP)  simple yes/no questions;questions about personal information;80%;with minimal cues (75-90%)  -MP      Time Frame (Comprehend Questions Goal 1, SLP)  short term goal (STG)  -MP         Follow Directions Goal 2 (SLP)    Improve Ability to Follow Directions Goal 1 (SLP)  1 step direction with objects;1 step direction without objects;80%;with minimal cues (75-90%)  -MP      Time Frame (Follow Directions Goal 1, SLP)  short term goal (STG)  -MP         Word Retrieval Skills Goal 1 (SLP)    Improve Word Retrieval Skills By Goal 1 (SLP)  completing automatic speech task, counting;completing automatic speech task, alphabet;completing automatic speech task, days of the week;completing automatic speech task, months;completing automatic speech task, Pledge of Allegiance;completing automatic speech task, sing “Happy  Birthday”;repeating sounds;80%;with minimal cues (75-90%)  -MP      Time Frame (Word Retrieval Goal 1, SLP)  short term goal (STG)  -MP         Motor Planning Goal 1 (SLP)    Improve Motor Planning to Reduce Apraxia by Goal 1 (SLP)  imitating mouth postures;imitating vowels;following isolated oral commands;80%;with minimal cues (75-90%)  -MP      Time Frame (Motor Planning Goal 1, SLP)  short term goal (STG)  -MP         Additional Goal 1 (SLP)    Additional Goal 1, SLP  LTG: Pt will improve communication in order to fully participate in care while in hospital setting w/ 100% acc and no cues  -MP      Time Frame (Additional Goal 1, SLP)  by discharge  -MP        User Key  (r) = Recorded By, (t) = Taken By, (c) = Cosigned By    Initials Name Provider Type    Magan Henriquez MS CCC-SLP Speech and Language Pathologist                  Time Calculation:     Time Calculation- SLP     Row Name 21 1104             Time Calculation- SLP    SLP Start Time  1040  -MP      SLP Received On  21  -MP        User Key  (r) = Recorded By, (t) = Taken By, (c) = Cosigned By    Initials Name Provider Type    Magan Henriquez MS CCC-SLP Speech and Language Pathologist          Therapy Charges for Today     Code Description Service Date Service Provider Modifiers Qty    52105501733 HC ST EVAL ORAL PHARYNG SWALLOW 2 2021 Magan Mooney MS CCC-SLP GN 1    [id number] HC ST EVAL SPEECH AND PROD W LANG  2 2021 Magan Mooney MS CCC-MICAH GN 1                     MS JAMIR Chaudhari  2021 and Acute Care - Speech Language Pathology   Swallow Initial Evaluation [facility]     Patient Name: Angel Grace  : 1970  MRN: [id number]  Today's Date: 2021               Admit Date: 2021    Visit Dx:     ICD-10-CM ICD-9-CM   1. Hypoxia  R09.02 799.02   2. Pneumonia of both lungs due to infectious organism, unspecified part of lung  J18.9 483.8   3. Recent cerebrovascular accident (CVA)   Z86.73 V49.89   4. Altered mental status, unspecified altered mental status type  R41.82 780.97     Patient Active Problem List   Diagnosis   • Sepsis due to pneumonia (CMS/[facility])   • Hypokalemia   • APRIL (acute kidney injury) (CMS/[facility])   • History of CVA (cerebrovascular accident)   • GERD without esophagitis   • DM2 (diabetes mellitus, type 2) (CMS/[facility])   • CAD (coronary artery disease)   • CKD (chronic kidney disease)   • Acute respiratory failure with hypoxia (CMS/[facility])   • Tobacco abuse   • AMS (altered mental status)   • Essential hypertension   • HLD (hyperlipidemia)     Past Medical History:   Diagnosis Date   • CAD (coronary artery disease) 1/9/2021   • CKD (chronic kidney disease) 1/9/2021   • DM2 (diabetes mellitus, type 2) (CMS/[facility]) 1/9/2021   • Essential hypertension 1/9/2021   • GERD without esophagitis 1/9/2021   • History of CVA (cerebrovascular accident) 1/9/2021   • HLD (hyperlipidemia) 1/9/2021     Past Surgical History:   Procedure Laterality Date   • SPLENECTOMY          SWALLOW EVALUATION (last 72 hours)      SLP Adult Swallow Evaluation     Row Name 01/11/21 1030                   General Information    Current Method of Nutrition  NPO;nasogastric feedings;other (see comments) had keofeed PTA  -MP        Prior Level of Function-Communication  expressive language impairment;other (see comments) per notes  -MP        Prior Level of Function-Swallowing  unknown;other (see comments);NPO;alternative feeding method had keofeed PTA, unclear if swallow evals @ [facility]  -MP        Patient's Goals for Discharge  patient did not state  -MP           Oral Motor Structure and Function    Secretion Management  other (see comments) audible upper airway secretions, wet breathing  -MP        Mucosal Quality  moist, healthy  -MP           General Eating/Swallowing Observations    Respiratory Support Currently in Use  nasal cannula  -MP        Eating/Swallowing Skills  fed by SLP  -MP        Positioning During Eating   upright in bed  -        Utensils Used  spoon  -MP        Consistencies Trialed  ice chips  -           Clinical Swallow Eval    Pharyngeal Phase  suspected pharyngeal impairment  -        Clinical Swallow Evaluation Summary  Wet breathing/audible upper airway secretions @ baseline. Incr'd w/ single ice chip. DNA further. Rec continue NPO w/ keofeed. SLP will continue to follow for re-eval.  -           Pharyngeal Phase Concerns    Pharyngeal Phase Concerns  wet vocal quality  -        Wet Vocal Quality  thin  -           Clinical Impression    SLP Swallowing Diagnosis  suspected pharyngeal dysphagia  -        Functional Impact  risk of aspiration/pneumonia  -        Rehab Potential/Prognosis, Swallowing  good, to achieve stated therapy goals  -        Swallow Criteria for Skilled Therapeutic Interventions Met  demonstrates skilled criteria  -           Recommendations    SLP Diet Recommendation  NPO;temporary alternate methods of nutrition/hydration  -        Recommended Diagnostics  reassess via clinical swallow evaluation  -        Oral Care Recommendations  Oral Care BID/PRN  -        SLP Rec. for Method of Medication Administration  meds via alternate route  -          User Key  (r) = Recorded By, (t) = Taken By, (c) = Cosigned By    Initials Name Effective Dates    Magan Henriquez MS CCC-SLP 06/19/19 -           EDUCATION  The patient has been educated in the following areas:   Dysphagia (Swallowing Impairment) NPO rationale.    SLP Recommendation and Plan  SLP Swallowing Diagnosis: suspected pharyngeal dysphagia  SLP Diet Recommendation: NPO, temporary alternate methods of nutrition/hydration     SLP Rec. for Method of Medication Administration: meds via alternate route        Recommended Diagnostics: reassess via clinical swallow evaluation  Swallow Criteria for Skilled Therapeutic Interventions Met: demonstrates skilled criteria  Anticipated Discharge Disposition (SLP):  inpatient rehabilitation facility, anticipate therapy at next level of care  Rehab Potential/Prognosis, Swallowing: good, to achieve stated therapy goals     Predicted Duration Therapy Intervention (Days): until discharge                         Plan of Care Reviewed With: patient    SLP GOALS     Row Name 01/11/21 1040             Words/Phrases/Sentences Goal 1 (SLP)    Improve Ability to Comprehend Words/Phrases/Sentences Through: Goal 1 (SLP)  identify objects, field of;other (comment);80%;with minimal cues (75-90%) 2  -MP      Time Frame (Identify Objects and Pictures Goal 1, SLP)  short term goal (STG)  -MP         Comprehend Questions Goal 1 (SLP)    Improve Ability to Comprehend Questions Goal 1 (SLP)  simple yes/no questions;questions about personal information;80%;with minimal cues (75-90%)  -MP      Time Frame (Comprehend Questions Goal 1, SLP)  short term goal (STG)  -MP         Follow Directions Goal 2 (SLP)    Improve Ability to Follow Directions Goal 1 (SLP)  1 step direction with objects;1 step direction without objects;80%;with minimal cues (75-90%)  -MP      Time Frame (Follow Directions Goal 1, SLP)  short term goal (STG)  -MP         Word Retrieval Skills Goal 1 (SLP)    Improve Word Retrieval Skills By Goal 1 (SLP)  completing automatic speech task, counting;completing automatic speech task, alphabet;completing automatic speech task, days of the week;completing automatic speech task, months;completing automatic speech task, Pledge of Allegiance;completing automatic speech task, sing “Happy Birthday”;repeating sounds;80%;with minimal cues (75-90%)  -MP      Time Frame (Word Retrieval Goal 1, SLP)  short term goal (STG)  -MP         Motor Planning Goal 1 (SLP)    Improve Motor Planning to Reduce Apraxia by Goal 1 (SLP)  imitating mouth postures;imitating vowels;following isolated oral commands;80%;with minimal cues (75-90%)  -MP      Time Frame (Motor Planning Goal 1, SLP)  short term goal (STG)   -MP         Additional Goal 1 (SLP)    Additional Goal 1, SLP  LTG: Pt will improve communication in order to fully participate in care while in hospital setting w/ 100% acc and no cues  -MP      Time Frame (Additional Goal 1, SLP)  by discharge  -MP        User Key  (r) = Recorded By, (t) = Taken By, (c) = Cosigned By    Initials Name Provider Type    Magan Henriquez MS CCC-SLP Speech and Language Pathologist             Time Calculation:   Time Calculation- SLP     Row Name 01/11/21 1104             Time Calculation- SLP    SLP Start Time  1040  -MP      SLP Received On  01/11/21  -MP        User Key  (r) = Recorded By, (t) = Taken By, (c) = Cosigned By    Initials Name Provider Type    Magan Henriquez MS CCC-SLP Speech and Language Pathologist          Therapy Charges for Today     Code Description Service Date Service Provider Modifiers Qty    12116761254 HC ST EVAL ORAL PHARYNG SWALLOW 2 1/11/2021 Magan Mooney MS CCC-SLP GN 1    [id number] HC ST EVAL SPEECH AND PROD W LANG  2 1/11/2021 Magan Mooney MS CCC-SLP GN 1          Patient was not wearing a face mask and did exhibit coughing during this therapy encounter.  Procedure performed was aerosolizing, involved close contact (within 6 feet for at least 15 minutes or longer), and did not involve contact with infectious secretions or specimens.  Therapist used appropriate personal protective equipment including gloves, standard procedure mask and eye protection.  Appropriate PPE was worn during the entire therapy session.  Hand hygiene was completed before and after therapy session.        MS JAMIR Chaudhari  1/11/2021

## 2021-01-12 NOTE — PROGRESS NOTES
"                  Clinical Nutrition     Nutrition Support Assessment  Reason for Visit:   Follow-up protocol, EN    Patient Name: Angel Grace  YOB: 1970  MRN: [id number]  Date of Encounter: 01/12/21 08:09 EST  Admission date: 1/9/2021    Comments: EN initiated today via NDT. NUT panel ordered for 1/13. Will continue to monitor labs and EN tolerance, adjusting nutrition support regimen as medically appropriate.      Nutrition Assessment   Assessment     Admission diagnosis  Altered mental status    Additional applicable diagnosis/conditions/procedures this adm:  Recent stroke    Acute respiratory failure with hypoxia  Pneumonia  Sepsis  Severely aphasic  APRIL, resolved  Hypokalemia    (1/9) keofeed in place from facility  (1/10) keofeed pulled out  (1/11) SLP eval, rec - NPO, temporary alternate methods of nutrition/hydration    Applicable PMH/PSxH:   HLP  HTN  CAD  PVD  DM2  GERD  CKD  History of cocaine use    Splenectomy    Reported/Observed/Food/Nutrition Related History:      Awaiting replacement of keofeed. Noted difficulty with replacement of keofeed. Discussed with RN, replaced and confirmed via KUB this AM. Feedings started. Patient without EN since 1/9. Unable to clarify weight/intake history with patient or family. Unsure last intake. Possible risk for refeeding. Discussed with RN will order phos and monitor for need of electrolyte replacement.      Anthropometrics     Height: 188 cm (74\")  Last filed wt: Weight: 108 kg (238 lb 1.6 oz) (01/11/21 0300)  Weight Method: Bed scale    BMI: BMI (Calculated): 30.6  Obese Class I: 30-34.9kg/m2    Ideal Body Weight (IBW) (kg): 87.66  Admission wt: 240 lb 1.3 oz  Method obtained: stated weight per charting 1/9     Weight Change   UBW: unsure  Weight change:   % wt change:   Time frame of weight loss:     Labs reviewed     Results from last 7 days   Lab Units 01/12/21  0606 01/11/21  0635 01/10/21  0741 01/09/21  1825   GLUCOSE mg/dL 252* 168* 140* " 83   BUN mg/dL 8 7 8 7   CREATININE mg/dL 1.02 0.96 1.15 1.28*   SODIUM mmol/L 139 135* 136 138   CHLORIDE mmol/L 100 98 100 99   POTASSIUM mmol/L 3.7 3.9 3.7 3.4*   ALT (SGPT) U/L  --   --   --  11     Results from last 7 days   Lab Units 01/09/21  1825   ALBUMIN g/dL 3.60       Results from last 7 days   Lab Units 01/12/21  0541 01/11/21  1801 01/11/21  1127 01/11/21  0602 01/11/21  0010 01/10/21  1815   GLUCOSE mg/dL 263* 202* 175* 190* 172* 152*     No results found for: HGBA1C      Medications reviewed   Pertinent: colace, gabapentin, merrem IV, protonix, senokot      Intake/Ouptut 24 hrs (7:00AM - 6:59 AM)     Intake & Output (last day)       01/11 0701 - 01/12 0700 01/12 0701 - 01/13 0700    Urine (mL/kg/hr)      Total Output      Net            Urine Unmeasured Occurrence 7 x     Stool Unmeasured Occurrence 1 x           Needs Assessment (1/12)     Height used  188 cm (74 in)   Weight used  107.7 kg (237 lbs) - bed scale   IBW  86.4 kg (190 lbs)   Obesity adjBW  91.7 kg (202 lbs)     Estimated need Method/Equation used Result    Energy/Calorie need   ~2300 kcals/d  20 - 22 actBW   25 kcal/kg obesity adjBW  2154 - 2369 kcal   2293 kcal             Protein   ~120 g/day  1.2 - 1.5 g/kg IBW   1 - 1.2 g/kg actBW  104 - 162 g   108 - 129 g        Fiber     Fluid  ~2300 mL/day  25 - 30 mL/kg obesity adjBW   1 mL/kcal  2293 mL   2300 mL            Current Nutrition Prescription     PO: NPO Diet  Orders Placed This Encounter      Diet, Tube Feeding Tube Feeding Formula: Diabetisource AC (Glucose Control)      EN: Diet, Tube Feeding Tube Feeding Formula: Diabetisource AC (Glucose Control)      EN to provide at goal volume  Intake/Evaluation of Received Nutrient/Fluid Intake last day [id number]:     At Target Goal Volume    % Est needs Received per I/O's    % Est needs   Volume  1980 ml        Modulars         Energy/kcals 2376 kcals % kcals %   Protein  119 g pro % g pro %                 Fiber 30 g      Fluid   W/Free  water 1620 ml  2280 ml                    Nutrition Diagnosis     1/10 updated 1/12  Problem Inadequate oral intake   Etiology Clinical status   Signs/Symptoms NPO    Status: EN intitiated    Nutrition Intervention   1.  Follow treatment progress, Care plan reviewed  2. Await keofeed replacement. When access obtained, recommend continue current order Diabetisource to start at 25 mL/hr and advance as tolerated by 15 mL q8Hrs to goal rate of 90 mL/hr. TGV = 1980 mL/hr. FW at 30 mL/hr.    Route: NDT     At Target Goal Volume     % Est needs   Volume  1980 ml     Energy/kcals 2376 kcals 103%   Protein 119 g pro 99%   Fiber 30 g         Fluid via EN 1620 mL    Total Fluid  2280 mL    Meets 100% RDI Yes        3. Weekly Nutrition panel, CRP and Prealbumin ordered to start Monday 1/18. Nutrition panel, CRP and Prealbumin ordered to be completed 1/13.     4. Monitor labs and tolerance of EN initiation/advancement and adjust regimen as medically appropriate    Goal:   General: Nutrition support treatment  PO: Monitor SLP evals  EN/PN: Initiate EN, Deliver estimated needs      Monitoring/Evaluation:   Per protocol, I&O, Pertinent labs, EN delivery/tolerance, Weight, Symptoms, POC/GOC, Swallow function    Will Continue to follow per protocol    Sharon Judd RDN, LD  Time Spent: 45 minutes

## 2021-01-12 NOTE — THERAPY RE-EVALUATION
Acute Care - Speech Language Pathology   Swallow Re-Evaluation [facility]   Clinical Swallow Re-Evaluation       Patient Name: Angel Grace  : 1970  MRN: [id number]  Today's Date: 2021               Admit Date: 2021    Visit Dx:     ICD-10-CM ICD-9-CM   1. Hypoxia  R09.02 799.02   2. Pneumonia of both lungs due to infectious organism, unspecified part of lung  J18.9 483.8   3. Recent cerebrovascular accident (CVA)  Z86.73 V49.89   4. Altered mental status, unspecified altered mental status type  R41.82 780.97     Patient Active Problem List   Diagnosis   • Sepsis due to pneumonia (CMS/[facility])   • Hypokalemia   • APRIL (acute kidney injury) (CMS/[facility])   • History of CVA (cerebrovascular accident)   • GERD without esophagitis   • DM2 (diabetes mellitus, type 2) (CMS/[facility])   • CAD (coronary artery disease)   • CKD (chronic kidney disease)   • Acute respiratory failure with hypoxia (CMS/[facility])   • Tobacco abuse   • AMS (altered mental status)   • Essential hypertension   • HLD (hyperlipidemia)     Past Medical History:   Diagnosis Date   • CAD (coronary artery disease) 2021   • CKD (chronic kidney disease) 2021   • DM2 (diabetes mellitus, type 2) (CMS/HCC) 2021   • Essential hypertension 2021   • GERD without esophagitis 2021   • History of CVA (cerebrovascular accident) 2021   • HLD (hyperlipidemia) 2021     Past Surgical History:   Procedure Laterality Date   • SPLENECTOMY          SWALLOW EVALUATION (last 72 hours)      SLP Adult Swallow Evaluation     Row Name 21 0910       Rehab Evaluation    Document Type  re-evaluation  -    Subjective Information  no complaints  -CJ    Patient Observations  poorly cooperative;lethargic  -CJ    Patient/Family/Caregiver Comments/Observations  no family present; in wrist restraints  -CJ    Care Plan Review  care plan/treatment goals reviewed  -CJ    Patient Effort  fair  -CJ    Symptoms Noted During/After Treatment  none  -CJ        General Information    Patient Profile Reviewed  yes  -CJ    Pertinent History Of Current Problem  see initial eval  -CJ    Current Method of Nutrition  NPO;nasogastric feedings;other (see comments)  -CJ    Precautions/Limitations, Vision  WFL;for purposes of eval  -CJ    Precautions/Limitations, Hearing  WFL;for purposes of eval  -CJ    Prior Level of Function-Communication  expressive language impairment;other (see comments)  -CJ    Prior Level of Function-Swallowing  unknown;other (see comments);NPO;alternative feeding method  -CJ    Plans/Goals Discussed with  patient;agreed upon  -CJ    Barriers to Rehab  medically complex;previous functional deficit  -CJ    Patient's Goals for Discharge  patient did not state;patient could not state  -CJ       Pain    Additional Documentation  Pain Scale: FACES Pre/Post-Treatment (Group)  -CJ       Pain Scale: FACES Pre/Post-Treatment    Pain: FACES Scale, Pretreatment  0-->no hurt  -CJ    Posttreatment Pain Rating  0-->no hurt  -CJ       Oral Motor Structure and Function    Dentition Assessment  natural, present and adequate  -CJ    Secretion Management  other (see comments);requires suctioning to control secretions;problems swallowing secretions upper airway secretions  -CJ    Mucosal Quality  moist, healthy  -CJ    Volitional Swallow  unable to elicit  -CJ    Volitional Cough  unable to elicit  -CJ       Oral Musculature and Cranial Nerve Assessment    Oral Motor General Assessment  oral labial or buccal impairment;other (see comments) unable to fully assess 2/2 cooperation/lethargy  -CJ    Oral Labial or Buccal Impairment, Detail, Cranial Nerve VII (Facial):  right labial droop  -CJ       General Eating/Swallowing Observations    Respiratory Support Currently in Use  nasal cannula  -CJ    Eating/Swallowing Skills  fed by SLP  -CJ    Positioning During Eating  upright in bed  -CJ    Utensils Used  spoon  -CJ    Consistencies Trialed  ice chips  -CJ       Clinical Swallow  Eval    Pharyngeal Phase  suspected pharyngeal impairment  -    Clinical Swallow Evaluation Summary  Pt seen for CSE re-eval this am. Pt NPO w/ keofeed placed this am. Upon SLP entry pt is noted w/ wet respirations at rest. SLP provides suction via yankauer to clear from oral cavity. Pt accepts ice chips x3 w/ oral holding, increased wet respirations post po presentations. Per this status, SLP suctions ice chip residue from oral cavity. Further po presentations are deferred at this time 2/2 pt participation, cooperation, lethargy and overall ams negatively impacting pt's swallowing fnx. He is felt to most benefit from continued NPO w/ keofeed for alternative method of nutrition/hydration. RN made aware. SLP will f/u for re-eval  -       Pharyngeal Phase Concerns    Pharyngeal Phase Concerns  wet vocal quality  -    Wet Vocal Quality  other (see comments) ice chips  -       Clinical Impression    SLP Swallowing Diagnosis  suspected pharyngeal dysphagia  -    Functional Impact  risk of aspiration/pneumonia  -    Rehab Potential/Prognosis, Swallowing  good, to achieve stated therapy goals  -    Swallow Criteria for Skilled Therapeutic Interventions Met  demonstrates skilled criteria  -       Recommendations    Predicted Duration Therapy Intervention (Days)  until discharge  -    SLP Diet Recommendation  NPO;temporary alternate methods of nutrition/hydration  -    Recommended Diagnostics  reassess via clinical swallow evaluation  -    Oral Care Recommendations  Oral Care BID/PRN  -    SLP Rec. for Method of Medication Administration  meds via alternate route  -    Anticipated Discharge Disposition (SLP)  inpatient rehabilitation facility;anticipate therapy at next level of care  -      User Key  (r) = Recorded By, (t) = Taken By, (c) = Cosigned By    Initials Name Effective Dates     Tessa Umana, MS CCC-SLP 02/11/20 -     Magan Henriquez MS CCC-SLP 06/19/19 -            EDUCATION  The patient has been educated in the following areas:   Dysphagia (Swallowing Impairment) Oral Care/Hydration NPO rationale.    SLP Recommendation and Plan  SLP Swallowing Diagnosis: suspected pharyngeal dysphagia  SLP Diet Recommendation: NPO, temporary alternate methods of nutrition/hydration     SLP Rec. for Method of Medication Administration: meds via alternate route        Recommended Diagnostics: reassess via clinical swallow evaluation  Swallow Criteria for Skilled Therapeutic Interventions Met: demonstrates skilled criteria  Anticipated Discharge Disposition (SLP): inpatient rehabilitation facility, anticipate therapy at next level of care  Rehab Potential/Prognosis, Swallowing: good, to achieve stated therapy goals     Predicted Duration Therapy Intervention (Days): until discharge     Plan of Care Reviewed With: patient  Progress: no change    SLP GOALS     Row Name 01/11/21 1040             Words/Phrases/Sentences Goal 1 (SLP)    Improve Ability to Comprehend Words/Phrases/Sentences Through: Goal 1 (SLP)  identify objects, field of;other (comment);80%;with minimal cues (75-90%) 2  -MP      Time Frame (Identify Objects and Pictures Goal 1, SLP)  short term goal (STG)  -MP         Comprehend Questions Goal 1 (SLP)    Improve Ability to Comprehend Questions Goal 1 (SLP)  simple yes/no questions;questions about personal information;80%;with minimal cues (75-90%)  -MP      Time Frame (Comprehend Questions Goal 1, SLP)  short term goal (STG)  -MP         Follow Directions Goal 2 (SLP)    Improve Ability to Follow Directions Goal 1 (SLP)  1 step direction with objects;1 step direction without objects;80%;with minimal cues (75-90%)  -MP      Time Frame (Follow Directions Goal 1, SLP)  short term goal (STG)  -MP         Word Retrieval Skills Goal 1 (SLP)    Improve Word Retrieval Skills By Goal 1 (SLP)  completing automatic speech task, counting;completing automatic speech task,  alphabet;completing automatic speech task, days of the week;completing automatic speech task, months;completing automatic speech task, Pledge of Allegiance;completing automatic speech task, sing “Happy Birthday”;repeating sounds;80%;with minimal cues (75-90%)  -MP      Time Frame (Word Retrieval Goal 1, SLP)  short term goal (STG)  -MP         Motor Planning Goal 1 (SLP)    Improve Motor Planning to Reduce Apraxia by Goal 1 (SLP)  imitating mouth postures;imitating vowels;following isolated oral commands;80%;with minimal cues (75-90%)  -MP      Time Frame (Motor Planning Goal 1, SLP)  short term goal (STG)  -MP         Additional Goal 1 (SLP)    Additional Goal 1, SLP  LTG: Pt will improve communication in order to fully participate in care while in hospital setting w/ 100% acc and no cues  -MP      Time Frame (Additional Goal 1, SLP)  by discharge  -MP        User Key  (r) = Recorded By, (t) = Taken By, (c) = Cosigned By    Initials Name Provider Type    Magan Henriquez MS CCC-SLP Speech and Language Pathologist             Time Calculation:   Time Calculation- SLP     Row Name 01/12/21 1337             Time Calculation- SLP    SLP Start Time  0910  -      SLP Received On  01/12/21  -        User Key  (r) = Recorded By, (t) = Taken By, (c) = Cosigned By    Initials Name Provider Type    Tessa Flores MS CCC-SLP Speech and Language Pathologist          Therapy Charges for Today     Code Description Service Date Service Provider Modifiers Qty    [id number] HC ST EVAL ORAL PHARYNG SWALLOW 4 1/12/2021 Tessa Umana MS CCC-SLP GN 1        Patient was not wearing a face mask and did exhibit coughing during this therapy encounter.  Procedure performed was aerosolizing, involved close contact (within 6 feet for at least 15 minutes or longer), and involved contact with infectious secretions or specimens.  Therapist used appropriate personal protective equipment including gloves, standard procedure mask  and eye protection.  Appropriate PPE was worn during the entire therapy session.  Hand hygiene was completed before and after therapy session.          Tessa Umana MS CCC-SLP  1/12/2021

## 2021-01-12 NOTE — PROGRESS NOTES
Continued Stay Note  [facility]     Patient Name: Angel Grace  MRN: [id number]  Today's Date: 1/12/2021    Admit Date: 1/9/2021    Discharge Plan     Row Name 01/12/21 1606       Plan    Plan  PRobable long term placement    Plan Comments  Patient's sister, Ms. Arguello arrives with her caregiver niece to visit her brother. She has not seen her brother since his admission last week into the [facility]. She was very tearful. I offered  services but declined. We talked about speaking tomorrow regarding his care needs/placement and will reach out to her then.    Final Discharge Disposition Code  30 - still a patient        Discharge Codes    No documentation.             Yani Dawn RN

## 2021-01-12 NOTE — PROGRESS NOTES
[facility] Medicine Services  PROGRESS NOTE    Patient Name: Angel Grace  : 1970  MRN: [id number]    Date of Admission: 2021  Primary Care Physician: Arlen Galvan MD    Subjective   Subjective     CC:  F/u respiratory failure, pneumonia    HPI:  Patient resting in bed. Non verbal and eyes are closed. Keofeed in place.    ROS:  Unable to obtains secondary to non-verbal status     Objective   Objective     Vital Signs:   Temp:  [99.1 °F (37.3 °C)-102 °F (38.9 °C)] 99.2 °F (37.3 °C)  Heart Rate:  [] 94  Resp:  [18-20] 18  BP: (138-193)/() 183/102  Total (NIH Stroke Scale): 23     Physical Exam:  Constitutional: No acute distress, awake, alert, morbidly obese  HENT: NCAT, mucous membranes dry, Keofeed in place  Respiratory: Clear to auscultation bilaterally, respiratory effort normal   Cardiovascular: RRR, no murmurs, rubs, or gallops  Gastrointestinal: Positive bowel sounds  Musculoskeletal: No bilateral ankle edema  Neurologic: non-verbal, eyes closed, in restraints.  Skin: No rashes      Results Reviewed:  Results from last 7 days   Lab Units 21  0621  0635 01/10/21  0741 21  1825   WBC 10*3/mm3 22.06* 19.37* 23.38* 18.96*   HEMOGLOBIN g/dL 12.8* 13.3 13.0 13.7   HEMATOCRIT % 37.7 39.9 38.1 39.8   PLATELETS 10*3/mm3 555* 536* 499* 518*   INR   --   --   --  1.02   PROCALCITONIN ng/mL  --   --   --  0.06     Results from last 7 days   Lab Units 21  0606 21  0635 01/10/21  0741 21  1825   SODIUM mmol/L 139 135* 136 138   POTASSIUM mmol/L 3.7 3.9 3.7 3.4*   CHLORIDE mmol/L 100 98 100 99   CO2 mmol/L 25.0 25.0 27.0 28.0   BUN mg/dL 8 7 8 7   CREATININE mg/dL 1.02 0.96 1.15 1.28*   GLUCOSE mg/dL 252* 168* 140* 83   CALCIUM mg/dL 8.6 8.6 8.5* 8.7   ALT (SGPT) U/L  --   --   --  11   AST (SGOT) U/L  --   --   --  16   TROPONIN T ng/mL  --   --   --  0.017   PROBNP pg/mL  --   --   --  67.3     Estimated Creatinine Clearance: 112.1  mL/min (by C-G formula based on SCr of 1.02 mg/dL).    Microbiology Results Abnormal     Procedure Component Value - Date/Time    Blood Culture - Blood, Arm, Right [[id number]] Collected: 01/09/21 1820    Lab Status: Preliminary result Specimen: Blood from Arm, Right Updated: 01/11/21 1846     Blood Culture No growth at 2 days    Blood Culture - Blood, Hand, Left [[id number]] Collected: 01/09/21 1823    Lab Status: Preliminary result Specimen: Blood from Hand, Left Updated: 01/11/21 1846     Blood Culture No growth at 2 days    MRSA Screen, PCR (Inpatient) - Swab, Nares [[id number]]  (Normal) Collected: 01/10/21 0052    Lab Status: Final result Specimen: Swab from Nares Updated: 01/10/21 0802     MRSA PCR Negative    Narrative:      MRSA Negative    COVID-19 and FLU A/B PCR - Swab, Nasopharynx [[id number]]  (Normal) Collected: 01/09/21 1825    Lab Status: Final result Specimen: Swab from Nasopharynx Updated: 01/09/21 2053     COVID19 Not Detected     Influenza A PCR Not Detected     Influenza B PCR Not Detected    Narrative:      Fact sheet for providers: https://www.fda.gov/media/229035/download    Fact sheet for patients: https://www.fda.gov/media/104897/download    Test performed by PCR.          Imaging Results (Last 24 Hours)     Procedure Component Value Units Date/Time    XR Abdomen KUB [[id number]] Collected: 01/12/21 0810     Updated: 01/12/21 0857    Narrative:      EXAMINATION: XR ABDOMEN KUB-      INDICATION: KUB placement; R09.02-Hypoxemia; J18.9-Pneumonia,  unspecified organism; Z86.73-Personal history of transient ischemic  attack (TIA), and cerebral infarction without residual deficits;  R41.82-Altered mental status, unspecified.      COMPARISON: Chest x-ray 01/11/2021.     FINDINGS: Feeding tube advanced and now terminates within the proximal  portion of the transverse duodenum likely third portion of the duodenum.            Impression:      Feeding tube advanced and now terminates within the  proximal  portion of the transverse duodenum likely third portion of the duodenum.     D:  01/12/2021  E:  01/12/2021       XR Abdomen KUB [[id number]] Collected: 01/11/21 1847     Updated: 01/12/21 0846    Narrative:      EXAMINATION: XR ABDOMEN KUB- 01/11/2021     INDICATION: Keofeed placement; R09.02-Hypoxemia; J18.9-Pneumonia,  unspecified organism; Z86.73-Personal history of transient ischemic  attack (TIA), and cerebral infarction without residual deficits;  R41.82-Altered mental status, unspecified      COMPARISON: 01/11/2021 earlier same day     FINDINGS: Feeding tube is coiled within the fundus of the stomach with  terminus in the proximal stomach near the GE junction more proximal than  on prior comparison.       Impression:      Feeding tube is coiled within the fundus of the stomach with  terminus in the proximal stomach near the GE junction more proximal than  on prior comparison.     D:  01/11/2021  E:  01/12/2021          XR Abdomen KUB [[id number]] Collected: 01/11/21 2249     Updated: 01/11/21 2251    Narrative:      CR Abdomen 1 Vw    INDICATION:   Tube placement    COMPARISON:   KUB from 1/11/2021 at 6:29 PM    FINDINGS:  AP radiographs of the abdomen. Feeding tube is coiled with tip in the stomach. Oral contrast is seen within the colon.      Impression:      Feeding tube is coiled with tip in the stomach. Findings is unchanged from prior.    Signer Name: Oliva Justice MD   Signed: 1/11/2021 10:49 PM   Workstation Name: [id number]    [facility]               I have reviewed the medications:  Scheduled Meds:aspirin, 81 mg, Nasogastric, Daily  atorvastatin, 80 mg, Nasogastric, Nightly  carvedilol, 12.5 mg, Nasogastric, BID With Meals  cefepime, 2 g, Intravenous, Q8H  clopidogrel, 75 mg, Nasogastric, Daily  sennosides, 10 mL, Oral, BID    And  docusate sodium, 100 mg, Oral, BID  escitalopram, 10 mg, Nasogastric, Nightly  gabapentin, 400 mg, Nasogastric, TID  heparin (porcine),  5,000 Units, Subcutaneous, Q12H  lansoprazole, 15 mg, Per G Tube, Q AM  losartan, 50 mg, Oral, Q24H  metroNIDAZOLE, 500 mg, Oral, Q8H  sodium chloride, 10 mL, Intravenous, Q12H      Continuous Infusions:   PRN Meds:.•  acetaminophen  •  acetaminophen  •  Fleets  •  ipratropium-albuterol  •  labetalol  •  LORazepam  •  melatonin  •  Morphine  •  ondansetron  •  oxyCODONE-acetaminophen  •  polyethylene glycol  •  [COMPLETED] Insert peripheral IV **AND** sodium chloride  •  sodium chloride    Assessment/Plan   Assessment & Plan     Active Hospital Problems    Diagnosis  POA   • **Acute respiratory failure with hypoxia (CMS/[facility]) [J96.01]  Yes   • Sepsis due to pneumonia (CMS/[facility]) [J18.9, A41.9]  Yes   • Hypokalemia [E87.6]  Yes   • APRIL (acute kidney injury) (CMS/[facility]) [N17.9]  Yes   • History of CVA (cerebrovascular accident) [Z86.73]  Not Applicable   • GERD without esophagitis [K21.9]  Yes   • DM2 (diabetes mellitus, type 2) (CMS/[facility]) [E11.9]  Yes   • CAD (coronary artery disease) [I25.10]  Yes   • CKD (chronic kidney disease) [N18.9]  Yes   • Tobacco abuse [Z72.0]  Yes   • AMS (altered mental status) [R41.82]  Yes   • Essential hypertension [I10]  Yes   • HLD (hyperlipidemia) [E78.5]  Yes      Resolved Hospital Problems   No resolved problems to display.        Brief Hospital Course to date:  Angel Grace is a 50 y.o. male with a PMH of HLD, HTN, CAD, PVD, T2DM, CKD, GERD, s/p splenectomy, and ongoing tobacco abuse with recent CVA who was admitted on 1/9/21 from [facility] for hypoxia and unresponsiveness.    Acute respiratory failure with hypoxia   Pneumonia - suspect aspiration  Sepsis - POA with tachycardia, leukocytosis, fever   - CTA chest without PE, has patchy bilateral airspace disease, Covid negative   - Continue supplemental oxygen and nebs as needed  - Continue nasotracheal suctioning PRN  - Strep pneumo pending, blood cultures negative  - change Meropenem to Cefepime/Flagyl. PCN allergic. See no need for  carbapenem currently, but will cover for aspiration and possible HCAP, MRSA PCR negative  - monitor leukocytosis, worsening this morning     APRIL  - resolved, continue to monitor     Recent CVA   Dysphagia w/Keofeed  - Continue tube feeds, RD following  - Tox screen +cocaine - presume this was positive prior to his admission at [facility] as he has been in rehab at [facility] since stroke  - Resume ASA, Plavix and statin per NG tube     Hypokalemia   - Replete per protocol     T2DM  - change to SSI Q6hrs while on tube feeds     HTN  - Resume Coreg and losartan      GERD - resume PPI     DVT Prophylaxis:  Heparin     Disposition: I expect the patient to be discharged pending improvement, updated patient's sister Mile Arguello via telephone    CODE STATUS:   Code Status and Medical Interventions:   Ordered at: 01/09/21 3058     Level Of Support Discussed With:    Patient     Code Status:    CPR     Medical Interventions (Level of Support Prior to Arrest):    Full       Taylor Salmeron, DO  01/12/21    >35 minutes spent on patient care, this includes chart review, discussion with specialists, Case Management and nursing staff, as well as patient and family. more than 50% of time spent face to face counseling patient on current illness and plan of care.

## 2021-01-12 NOTE — PLAN OF CARE
Goal Outcome Evaluation:  Plan of Care Reviewed With: patient  Progress: no change   SLP re-evaluation completed. Will continue to address dysphagia. Please see note for further details and recommendations.

## 2021-01-13 NOTE — THERAPY EVALUATION
Acute Care - Speech Language Pathology   Swallow Re-Evaluation [facility]   Clinical Swallow Evaluation  Language Tx     Patient Name: Angel Grace  : 1970  MRN: [id number]  Today's Date: 2021               Admit Date: 2021    Visit Dx:     ICD-10-CM ICD-9-CM   1. Hypoxia  R09.02 799.02   2. Pneumonia of both lungs due to infectious organism, unspecified part of lung  J18.9 483.8   3. Recent cerebrovascular accident (CVA)  Z86.73 V49.89   4. Altered mental status, unspecified altered mental status type  R41.82 780.97     Patient Active Problem List   Diagnosis   • Sepsis due to pneumonia (CMS/[facility]ly [facility])   • Hypokalemia   • APRIL (acute kidney injury) (CMS/Formerly [facility])   • History of CVA (cerebrovascular accident)   • GERD without esophagitis   • DM2 (diabetes mellitus, type 2) (CMS/[facility])   • CAD (coronary artery disease)   • CKD (chronic kidney disease)   • Acute respiratory failure with hypoxia (CMS/[facility])   • Tobacco abuse   • AMS (altered mental status)   • Essential hypertension   • HLD (hyperlipidemia)     Past Medical History:   Diagnosis Date   • CAD (coronary artery disease) 2021   • CKD (chronic kidney disease) 2021   • DM2 (diabetes mellitus, type 2) (CMS/HCC) 2021   • Essential hypertension 2021   • GERD without esophagitis 2021   • History of CVA (cerebrovascular accident) 2021   • HLD (hyperlipidemia) 2021     Past Surgical History:   Procedure Laterality Date   • SPLENECTOMY          SWALLOW EVALUATION (last 72 hours)      SLP Adult Swallow Evaluation     Row Name 21 1045 21 0910 21 1030             Rehab Evaluation    Document Type  re-evaluation;therapy note (daily note)  -MP  re-evaluation  -CJ  --      Subjective Information  no complaints  -MP  no complaints  -CJ  --      Patient Observations  alert;cooperative  -MP  poorly cooperative;lethargic  -CJ  --      Patient/Family/Caregiver Comments/Observations  No family present  -MP  no family present; in  wrist restraints  -CJ  --      Care Plan Review  care plan/treatment goals reviewed;patient/other agree to care plan  -  care plan/treatment goals reviewed  -CJ  --      Patient Effort  fair  -MP  fair  -CJ  --      Symptoms Noted During/After Treatment  --  none  -CJ  --         General Information    Patient Profile Reviewed  yes  -MP  yes  -CJ  --      Pertinent History Of Current Problem  Pt adm fr [facility] 1/9 w/ AMS, acute respiratory failure w/ hypoxia. PNA w/ sepsis, concern for aspiration. Had keofeed when arrived from [facility]. Presented to [facility] 1/3 w/ R facial droop, severe aphasia, UDS + cocaine. Per notes, pt typically responds by tracking eyes & nodding/shaking head Y/N. Hx CVAx2 - most recent Oct 2020, HLD, HTN, CAD, PVD, DM2, CKD, GERD, s/p splenectomy, tobacco use.  -MP  see initial eval  -CJ  --      Current Method of Nutrition  NPO;nasogastric feedings;other (see comments)  -MP  NPO;nasogastric feedings;other (see comments)  -CJ  NPO;nasogastric feedings;other (see comments) had keofeed PTA  -MP      Precautions/Limitations, Vision  WFL;for purposes of eval  -MP  WFL;for purposes of eval  -CJ  --      Precautions/Limitations, Hearing  WFL;for purposes of eval  -MP  WFL;for purposes of eval  -CJ  --      Prior Level of Function-Communication  expressive language impairment;other (see comments) per notes  -MP  expressive language impairment;other (see comments)  -CJ  expressive language impairment;other (see comments) per notes  -MP      Prior Level of Function-Swallowing  unknown;other (see comments);NPO;alternative feeding method had keofeed PTA, unclear if swallow evals @ [facility]  unknown;other (see comments);NPO;alternative feeding method  -CJ  unknown;other (see comments);NPO;alternative feeding method had keofeed PTA, unclear if swallow evals @ [facility]      Plans/Goals Discussed with  patient;agreed upon  -MP  patient;agreed upon  -CJ  --      Barriers to Rehab  medically  complex;previous functional deficit  -MP  medically complex;previous functional deficit  -CJ  --      Patient's Goals for Discharge  patient did not state  -MP  patient did not state;patient could not state  -CJ  patient did not state  -MP         Pain    Additional Documentation  Pain Scale: FACES Pre/Post-Treatment (Group)  -MP  Pain Scale: FACES Pre/Post-Treatment (Group)  -CJ  --         Pain Scale: FACES Pre/Post-Treatment    Pain: FACES Scale, Pretreatment  0-->no hurt  -MP  0-->no hurt  -CJ  --      Posttreatment Pain Rating  0-->no hurt  -MP  0-->no hurt  -CJ  --         Oral Motor Structure and Function    Dentition Assessment  natural, present and adequate  -MP  natural, present and adequate  -CJ  --      Secretion Management  other (see comments) audible upper airway secretions, wet breathing @ baseline  -MP  other (see comments);requires suctioning to control secretions;problems swallowing secretions upper airway secretions  -CJ  other (see comments) audible upper airway secretions, wet breathing  -MP      Mucosal Quality  moist, healthy  -MP  moist, healthy  -CJ  moist, healthy  -MP      Volitional Swallow  --  unable to elicit  -CJ  --      Volitional Cough  --  unable to elicit  -CJ  --         Oral Musculature and Cranial Nerve Assessment    Oral Motor General Assessment  oral labial or buccal impairment;unable to assess;other (see comments) pt not following OM commands to fully assess  -MP  oral labial or buccal impairment;other (see comments) unable to fully assess 2/2 cooperation/lethargy  -CJ  --      Oral Labial or Buccal Impairment, Detail, Cranial Nerve VII (Facial):  right labial droop  -MP  right labial droop  -CJ  --         General Eating/Swallowing Observations    Respiratory Support Currently in Use  nasal cannula  -MP  nasal cannula  -CJ  nasal cannula  -MP      Eating/Swallowing Skills  fed by SLP  -MP  fed by SLP  -CJ  fed by SLP  -MP      Positioning During Eating  upright in bed   -MP  upright in bed  -CJ  upright in bed  -MP      Utensils Used  spoon  -MP  spoon  -CJ  spoon  -MP      Consistencies Trialed  ice chips  -MP  ice chips  -CJ  ice chips  -MP         Clinical Swallow Eval    Oral Prep Phase  impaired  -MP  --  --      Oral Transit  impaired  -MP  --  --      Pharyngeal Phase  suspected pharyngeal impairment  -MP  suspected pharyngeal impairment  -CJ  suspected pharyngeal impairment  -MP      Clinical Swallow Evaluation Summary  Pt given one ice chip. No attempt to orally manipulate or transit ice chip. Suctioned from oral cavity. No visible initiation of pharyngeal swallow noted. Pt not safe/appropriate for PO diet @ this time. Rec continue NPO w/ keofeed.  -MP  Pt seen for CSE re-eval this am. Pt NPO w/ keofeed placed this am. Upon SLP entry pt is noted w/ wet respirations at rest. SLP provides suction via yankauer to clear from oral cavity. Pt accepts ice chips x3 w/ oral holding, increased wet respirations post po presentations. Per this status, SLP suctions ice chip residue from oral cavity. Further po presentations are deferred at this time 2/2 pt participation, cooperation, lethargy and overall ams negatively impacting pt's swallowing fnx. He is felt to most benefit from continued NPO w/ keofeed for alternative method of nutrition/hydration. RN made aware. SLP will f/u for re-eval  -  Wet breathing/audible upper airway secretions @ baseline. Incr'd w/ single ice chip. DNA further. Rec continue NPO w/ keofeed. SLP will continue to follow for re-eval.  -MP         Oral Prep Concerns    Oral Prep Concerns  bolus removed from mouth manually  -MP  --  --      Bolus Removed from Mouth Manually  thin;other (see comments) suctioned ice chip  -MP  --  --         Oral Transit Concerns    Oral Transit Concerns  unable to initiate oral transit  -MP  --  --      Oral Transit Concerns, Comment  no attempt to transit ice chip  -MP  --  --         Pharyngeal Phase Concerns    Pharyngeal  Phase Concerns  other (see comments) no initiation of pharyngeal swallow visualized  -MP  wet vocal quality  -CJ  wet vocal quality  -MP      Wet Vocal Quality  other (see comments)  -MP  other (see comments) ice chips  -CJ  thin  -MP      Pharyngeal Phase Concerns, Comment  wet breathing/wet vocal quality @ baseline prior to PO trials  -MP  --  --         Clinical Impression    SLP Swallowing Diagnosis  suspected pharyngeal dysphagia  -MP  suspected pharyngeal dysphagia  -CJ  suspected pharyngeal dysphagia  -MP      Functional Impact  risk of aspiration/pneumonia  -MP  risk of aspiration/pneumonia  -CJ  risk of aspiration/pneumonia  -MP      Rehab Potential/Prognosis, Swallowing  good, to achieve stated therapy goals  -MP  good, to achieve stated therapy goals  -CJ  good, to achieve stated therapy goals  -MP      Swallow Criteria for Skilled Therapeutic Interventions Met  demonstrates skilled criteria  -MP  demonstrates skilled criteria  -CJ  demonstrates skilled criteria  -MP         Recommendations    Therapy Frequency (Swallow)  5 days per week  -MP  --  --      Predicted Duration Therapy Intervention (Days)  until discharge  -MP  until discharge  -CJ  --      SLP Diet Recommendation  NPO;temporary alternate methods of nutrition/hydration  -MP  NPO;temporary alternate methods of nutrition/hydration  -CJ  NPO;temporary alternate methods of nutrition/hydration  -      Recommended Diagnostics  --  reassess via clinical swallow evaluation  -CJ  reassess via clinical swallow evaluation  -      Oral Care Recommendations  Oral Care BID/PRN  -MP  Oral Care BID/PRN  -CJ  Oral Care BID/PRN  -MP      SLP Rec. for Method of Medication Administration  meds via alternate route  -MP  meds via alternate route  -CJ  meds via alternate route  -      Anticipated Discharge Disposition (SLP)  inpatient rehabilitation facility;anticipate therapy at next level of care  -  inpatient rehabilitation facility;anticipate therapy at  next level of care  -KANCHAN  --        User Key  (r) = Recorded By, (t) = Taken By, (c) = Cosigned By    Initials Name Effective Dates    Tessa Flores, MS CCC-SLP 02/11/20 -     Magan Henriquez MS CCC-SLP 06/19/19 -           EDUCATION  The patient has been educated in the following areas:   Communication Impairment Dysphagia (Swallowing Impairment) NPO rationale.    SLP Recommendation and Plan  SLP Swallowing Diagnosis: suspected pharyngeal dysphagia  SLP Diet Recommendation: NPO, temporary alternate methods of nutrition/hydration     SLP Rec. for Method of Medication Administration: meds via alternate route           Swallow Criteria for Skilled Therapeutic Interventions Met: demonstrates skilled criteria  Anticipated Discharge Disposition (SLP): inpatient rehabilitation facility, anticipate therapy at next level of care  Rehab Potential/Prognosis, Swallowing: good, to achieve stated therapy goals  Therapy Frequency (Swallow): 5 days per week  Predicted Duration Therapy Intervention (Days): until discharge                         Plan of Care Reviewed With: patient    SLP GOALS     Row Name 01/13/21 1045 01/11/21 1040          Oral Nutrition/Hydration Goal 1 (SLP)    Oral Nutrition/Hydration Goal 1, SLP  LTG: Pt will return to PO diet w/ no overt s/sxs aspiration/distress w/ 100% acc and no cues  -MP  --     Time Frame (Oral Nutrition/Hydration Goal 1, SLP)  by discharge  -MP  --        Oral Nutrition/Hydration Goal 2 (SLP)    Oral Nutrition/Hydration Goal 2, SLP  Pt will tolerate therapeutic trials of thin H2O & puree w/ no overt s/sxs aspiration/distress w/ 60% acc and no cues in order to assess appropriateness for instrumental evaluation.  -MP  --     Time Frame (Oral Nutrition/Hydration Goal 2, SLP)  short term goal (STG)  -MP  --        Words/Phrases/Sentences Goal 1 (SLP)    Improve Ability to Comprehend Words/Phrases/Sentences Through: Goal 1 (SLP)  identify objects, field of;other  (comment);80%;with minimal cues (75-90%) 2  -MP  identify objects, field of;other (comment);80%;with minimal cues (75-90%) 2  -MP     Time Frame (Identify Objects and Pictures Goal 1, SLP)  short term goal (STG)  -MP  short term goal (STG)  -MP     Progress/Outcomes (Identify Objects and Pictures Goal 1, SLP)  goal ongoing  -MP  --        Comprehend Questions Goal 1 (SLP)    Improve Ability to Comprehend Questions Goal 1 (SLP)  simple yes/no questions;questions about personal information;80%;with minimal cues (75-90%)  -MP  simple yes/no questions;questions about personal information;80%;with minimal cues (75-90%)  -MP     Time Frame (Comprehend Questions Goal 1, SLP)  short term goal (STG)  -MP  short term goal (STG)  -MP     Progress (Ability to Comprehend Questions Goal 1, SLP)  0%;with maximum cues (25-49%)  -MP  --     Progress/Outcomes (Comprehend Questions Goal 1, SLP)  progress slower than expected  -MP  --        Follow Directions Goal 2 (SLP)    Improve Ability to Follow Directions Goal 1 (SLP)  1 step direction with objects;1 step direction without objects;80%;with minimal cues (75-90%)  -MP  1 step direction with objects;1 step direction without objects;80%;with minimal cues (75-90%)  -MP     Time Frame (Follow Directions Goal 1, SLP)  short term goal (STG)  -MP  short term goal (STG)  -MP     Progress (Ability to Follow Directions Goal 1, SLP)  0%;with maximum cues (25-49%)  -MP  --     Progress/Outcomes (Follow Directions Goal 1, SLP)  progress slower than expected  -MP  --        Word Retrieval Skills Goal 1 (SLP)    Improve Word Retrieval Skills By Goal 1 (SLP)  completing automatic speech task, counting;completing automatic speech task, alphabet;completing automatic speech task, days of the week;completing automatic speech task, months;completing automatic speech task, Pledge of Allegiance;completing automatic speech task, sing “Happy Birthday”;repeating sounds;80%;with minimal cues (75-90%)  -MP   completing automatic speech task, counting;completing automatic speech task, alphabet;completing automatic speech task, days of the week;completing automatic speech task, months;completing automatic speech task, Pledge of Allegiance;completing automatic speech task, sing “Happy Birthday”;repeating sounds;80%;with minimal cues (75-90%)  -MP     Time Frame (Word Retrieval Goal 1, SLP)  short term goal (STG)  -MP  short term goal (STG)  -MP     Progress/Outcomes (Word Retrieval Goal 1, SLP)  goal ongoing  -MP  --        Motor Planning Goal 1 (SLP)    Improve Motor Planning to Reduce Apraxia by Goal 1 (SLP)  imitating mouth postures;imitating vowels;following isolated oral commands;80%;with minimal cues (75-90%)  -MP  imitating mouth postures;imitating vowels;following isolated oral commands;80%;with minimal cues (75-90%)  -MP     Time Frame (Motor Planning Goal 1, SLP)  short term goal (STG)  -MP  short term goal (STG)  -MP     Progress (Motor Planning Goal 1, SLP)  0%;with maximum cues (25-49%)  -MP  --     Progress/Outcomes (Motor Planning Goal 1, SLP)  progress slower than expected  -MP  --        Additional Goal 1 (SLP)    Additional Goal 1, SLP  LTG: Pt will improve communication in order to fully participate in care while in hospital setting w/ 100% acc and no cues  -MP  LTG: Pt will improve communication in order to fully participate in care while in hospital setting w/ 100% acc and no cues  -MP     Time Frame (Additional Goal 1, SLP)  by discharge  -MP  by discharge  -MP     Progress/Outcomes (Additional Goal 1, SLP)  progress slower than expected  -MP  --       User Key  (r) = Recorded By, (t) = Taken By, (c) = Cosigned By    Initials Name Provider Type    Magan Henriquez MS CCC-SLP Speech and Language Pathologist             Time Calculation:   Time Calculation- SLP     Row Name 01/13/21 1106             Time Calculation- SLP    SLP Start Time  1045  -MP      SLP Received On  01/13/21  -MP        User  Key  (r) = Recorded By, (t) = Taken By, (c) = Cosigned By    Initials Name Provider Type    Magan Henriquez MS CCC-SLP Speech and Language Pathologist          Therapy Charges for Today     Code Description Service Date Service Provider Modifiers Qty    [id number]  ST EVAL ORAL PHARYNG SWALLOW 2 1/13/2021 Magan Mooney MS CCC-SLP GN 1    [id number]  ST TREATMENT SPEECH 1 1/13/2021 Magan Mooney MS CCC-SLP GN 1          Patient was not wearing a face mask and did not exhibit coughing during this therapy encounter.  Procedure performed was aerosolizing, did not involve close contact (within 6 feet for at least 15 minutes or longer), and did not involve contact with infectious secretions or specimens.  Therapist used appropriate personal protective equipment including gloves, standard procedure mask and eye protection.  Appropriate PPE was worn during the entire therapy session.  Hand hygiene was completed before and after therapy session.  Aurelia Altman, SLP student, was also present during this encounter and the aforementioned applies to them, as well.        MS JAMIR Chaudhari  1/13/2021

## 2021-01-13 NOTE — PROGRESS NOTES
Continued Stay Note  [facility]     Patient Name: Angel Grace  MRN: [id number]  Today's Date: 1/13/2021    Admit Date: 1/9/2021    Discharge Plan     Row Name 01/13/21 0842       Plan    Plan  Complex Care now following    Plan Comments  Complex Care SW spoke about discharge needs with CM.  Complex Care will  for discharge planning and will attend MDR today.  SW will also contact pt's sister today.  SEEMA Morales @ [phone]    Final Discharge Disposition Code  30 - still a patient        Discharge Codes    No documentation.             SEEMA Matson

## 2021-01-13 NOTE — PLAN OF CARE
Goal Outcome Evaluation:  Plan of Care Reviewed With: patient  Progress: no change     SLP evaluation and treatment completed. Will continue to address dysphagia & communication. Please see note for further details and recommendations.

## 2021-01-13 NOTE — CONSULTS
Arlen Galvan MD  Consulting physician: Jaron    Chief Complaint   Patient presents with   • Weakness - Generalized         HPI: Patient is a 50-year-old male who is currently nonverbal and noncommunicative.  Patient has a history of multiple CVAs.  Patient brought to hospital at this time due to generalized weakness along with hypoxia and respiratory failure.  Patient was found to have pneumonia and to be septic.  Patient has been treated but again remains nonverbal and unresponsive.  Please note that I am unsure what the patient's baseline is due to his multiple CVAs.      Past Medical History:   Diagnosis Date   • CAD (coronary artery disease) 1/9/2021   • CKD (chronic kidney disease) 1/9/2021   • DM2 (diabetes mellitus, type 2) (CMS/HCC) 1/9/2021   • Essential hypertension 1/9/2021   • GERD without esophagitis 1/9/2021   • History of CVA (cerebrovascular accident) 1/9/2021   • HLD (hyperlipidemia) 1/9/2021     Past Surgical History:   Procedure Laterality Date   • SPLENECTOMY       Current Code Status     Date Active Code Status Order ID Comments User Context       1/9/2021 2308 CPR [id number]  Ángela Ba PA-C ED     Advance Care Planning Activity      Questions for Current Code Status     Question Answer Comment    Code Status CPR     Medical Interventions (Level of Support Prior to Arrest) Full     Level Of Support Discussed With Patient         Current Facility-Administered Medications   Medication Dose Route Frequency Provider Last Rate Last Admin   • acetaminophen (TYLENOL) suppository 650 mg  650 mg Rectal Q4H PRN Margaret Dow MD   650 mg at 01/11/21 2132   • acetaminophen (TYLENOL) tablet 650 mg  650 mg Oral Q4H PRN Ángela Ba PA-C   650 mg at 01/12/21 1509   • aspirin chewable tablet 81 mg  81 mg Nasogastric Daily Margaret Dow MD   81 mg at 01/13/21 0939   • atorvastatin (LIPITOR) tablet 80 mg  80 mg Nasogastric Nightly Margaret Dow MD   80 mg at 01/12/21 2117   • carvedilol  (COREG) tablet 12.5 mg  12.5 mg Nasogastric BID With Meals Margaret Dow MD   12.5 mg at 01/13/21 0938   • cefepime (MAXIPIME) 2 g/100 mL 0.9% NS (mbp)  2 g Intravenous Q8H Taylor Salmeron R, DO   2 g at 01/13/21 1235   • clopidogrel (PLAVIX) tablet 75 mg  75 mg Nasogastric Daily Margaret Dow MD   75 mg at 01/13/21 0938   • dextrose (D50W) 25 g/ 50mL Intravenous Solution 25 g  25 g Intravenous Q15 Min PRN Taylor Salmeron, DO       • dextrose (GLUTOSE) oral gel 15 g  15 g Oral Q15 Min PRN Taylor Salmeron, DO       • sennosides (SENOKOT) 8.8 MG/5ML syrup 10 mL  10 mL Oral BID Margaret Dow MD   10 mL at 01/13/21 0938    And   • docusate sodium (COLACE) liquid 100 mg  100 mg Oral BID Margaret Dow MD   100 mg at 01/13/21 0939   • escitalopram (LEXAPRO) tablet 10 mg  10 mg Nasogastric Nightly Margaret Dow MD   10 mg at 01/12/21 2118   • Fleets enema 1 enema  1 enema Rectal Once PRN Sugar Herrera, KEE       • gabapentin (NEURONTIN) capsule 400 mg  400 mg Nasogastric TID Margaret Dow MD   400 mg at 01/13/21 0938   • glucagon (human recombinant) (GLUCAGEN DIAGNOSTIC) injection 1 mg  1 mg Subcutaneous Q15 Min PRN Taylor Salmeron, DO       • glycopyrrolate (ROBINUL) injection 0.1 mg  0.1 mg Intravenous Q4H PRN Taylor Salmeron, DO   0.1 mg at 01/13/21 1306   • guaiFENesin (ROBITUSSIN) 100 MG/5ML oral solution 200 mg  200 mg Oral Q4H Sugar Herrera APRN   200 mg at 01/13/21 1238   • heparin (porcine) 5000 UNIT/ML injection 5,000 Units  5,000 Units Subcutaneous Q8H Taylor Salmeron R, DO       • insulin regular (humuLIN R,novoLIN R) injection 0-7 Units  0-7 Units Subcutaneous Q6H Jaron, Taylor R, DO   3 Units at 01/13/21 1238   • insulin regular (humuLIN R,novoLIN R) injection 6 Units  6 Units Subcutaneous Q6H JaronTaylor clifford R, DO   6 Units at 01/13/21 1237   • ipratropium-albuterol (DUO-NEB) nebulizer solution 3 mL  3 mL Nebulization Q4H PRN Ángela Ba PA-C   3 mL at 01/12/21 1718   •  ipratropium-albuterol (DUO-NEB) nebulizer solution 3 mL  3 mL Nebulization 4x Daily - RT Sharon Sugar SHAFER, APRN   3 mL at 01/13/21 1243   • labetalol (NORMODYNE,TRANDATE) injection 20 mg  20 mg Intravenous Q10 Min PRN Margaret Dow MD   20 mg at 01/12/21 1138   • lansoprazole (FIRST) oral suspension 15 mg  15 mg Per G Tube Q AM Margaret Dow MD   15 mg at 01/13/21 0557   • LORazepam (ATIVAN) injection 2 mg  2 mg Intravenous Q4H PRN Margaret Dow MD   2 mg at 01/13/21 0316   • [START ON 1/14/2021] losartan (COZAAR) tablet 50 mg  50 mg Oral Q24H Taylor Salmeron DO       • melatonin tablet 5 mg  5 mg Oral Nightly PRN Ángela Ba PA-C   5 mg at 01/12/21 0017   • metroNIDAZOLE (FLAGYL) tablet 500 mg  500 mg Oral Q8H Taylor Salmeron DO   500 mg at 01/13/21 0529   • morphine injection 2 mg  2 mg Intravenous Q4H PRN Margaret Dow MD   2 mg at 01/10/21 1855   • ondansetron (ZOFRAN) injection 4 mg  4 mg Intravenous Q6H PRN Ángela Ba PA-C       • oxyCODONE-acetaminophen (PERCOCET) 5-325 MG per tablet 1 tablet  1 tablet Nasogastric Q6H PRN Margaret Dow MD   1 tablet at 01/13/21 0939   • polyethylene glycol (MIRALAX) packet 17 g  17 g Oral Daily PRN Margaret Dow MD       • sodium chloride 0.9 % flush 10 mL  10 mL Intravenous PRN Ayesha Banerjee PA       • sodium chloride 0.9 % flush 10 mL  10 mL Intravenous Q12H Ángela Ba PA-C   10 mL at 01/13/21 0939   • sodium chloride 0.9 % flush 10 mL  10 mL Intravenous PRN Ángela Ba PAJenaroC            •  acetaminophen  •  acetaminophen  •  dextrose  •  dextrose  •  Fleets  •  glucagon (human recombinant)  •  glycopyrrolate  •  ipratropium-albuterol  •  labetalol  •  LORazepam  •  melatonin  •  Morphine  •  ondansetron  •  oxyCODONE-acetaminophen  •  polyethylene glycol  •  [COMPLETED] Insert peripheral IV **AND** sodium chloride  •  sodium chloride  Allergies   Allergen Reactions   • Penicillins Unknown - Low Severity     Has tolerated  "cefepime     History reviewed. No pertinent family history.  Social History     Socioeconomic History   • Marital status: Single     Spouse name: Not on file   • Number of children: Not on file   • Years of education: Not on file   • Highest education level: Not on file   Tobacco Use   • Smoking status: Current Every Day Smoker     Types: Cigarettes   Substance and Sexual Activity   • Alcohol use: Defer   • Drug use: Yes     Types: \"Crack\" cocaine   • Sexual activity: Defer     Review of Systems -unable to obtain secondary to altered mental status      /84 (BP Location: Left arm, Patient Position: Sitting)   Pulse 82   Temp 97.9 °F (36.6 °C) (Axillary)   Resp 18   Ht 188 cm (74\")   Wt 108 kg (238 lb 1.6 oz)   SpO2 93%   BMI 30.57 kg/m²     Intake/Output Summary (Last 24 hours) at 1/13/2021 1427  Last data filed at 1/13/2021 1235  Gross per 24 hour   Intake 835 ml   Output --   Net 835 ml     Physical Exam:      General Appearance:    Opens eyes, doesn't attempt to communicate   Head:    Normocephalic, without obvious abnormality, atraumatic   Eyes:            Lids and lashes normal, conjunctivae and sclerae normal, no   icterus, no pallor, corneas clear, PERRLA   Ears:    Ears appear intact with no abnormalities noted   Throat:   No oral lesions, no thrush, oral mucosa moist   Neck:   No adenopathy, supple, trachea midline, no thyromegaly, no   carotid bruit, no JVD   Back:     No kyphosis present, no scoliosis present, no skin lesions,      erythema or scars, no tenderness to percussion or                   palpation,   range of motion normal   Lungs:     Clear to auscultation,respirations regular, even and                  unlabored    Heart:    Regular rhythm and normal rate, normal S1 and S2, no            murmur, no gallop, no rub, no click   Chest Wall:    No abnormalities observed   Abdomen:     Normal bowel sounds, no masses, no organomegaly, soft        non-tender, non-distended, no guarding, " no rebound                tenderness   Rectal:     Deferred       Pulses:   Pulses palpable and equal bilaterally   Skin:   No bleeding, bruising or rash   Lymph nodes:   No palpable adenopathy           Results from last 7 days   Lab Units 01/13/21  0830   WBC 10*3/mm3 17.54*   HEMOGLOBIN g/dL 13.1   HEMATOCRIT % 38.5   PLATELETS 10*3/mm3 603*     Results from last 7 days   Lab Units 01/13/21  0830   SODIUM mmol/L 140   POTASSIUM mmol/L 3.9   CHLORIDE mmol/L 103   CO2 mmol/L 29.0   BUN mg/dL 12   CREATININE mg/dL 1.04   CALCIUM mg/dL 8.6   BILIRUBIN mg/dL 0.6   ALK PHOS U/L 114   ALT (SGPT) U/L 8   AST (SGOT) U/L 12   GLUCOSE mg/dL 209*     Results from last 7 days   Lab Units 01/13/21  0830   SODIUM mmol/L 140   POTASSIUM mmol/L 3.9   CHLORIDE mmol/L 103   CO2 mmol/L 29.0   BUN mg/dL 12   CREATININE mg/dL 1.04   GLUCOSE mg/dL 209*   CALCIUM mg/dL 8.6     Imaging Results (Last 72 Hours)     Procedure Component Value Units Date/Time    XR Chest 1 View [[id number]] Collected: 01/12/21 2232     Updated: 01/12/21 2234    Narrative:      CR Chest 1 Vw    INDICATION:   Increasing oxygen requirements. Hypoxemia.     COMPARISON:    None available.    FINDINGS:  Single portable AP view(s) of the chest.    Heart is mildly enlarged. Pulmonary vascularity is normal. The lungs are clear. No pneumothorax is seen. Tip of the feeding tube is not seen but is below the diaphragm.       Impression:      No acute cardiopulmonary findings.    Signer Name: Eddie Herron MD   Signed: 1/12/2021 10:32 PM   Workstation Name: [id number]    [facility]    XR Abdomen KUB [[id number]] Collected: 01/12/21 0810     Updated: 01/12/21 1708    Narrative:      EXAMINATION: XR ABDOMEN KUB-      INDICATION: KUB placement; R09.02-Hypoxemia; J18.9-Pneumonia,  unspecified organism; Z86.73-Personal history of transient ischemic  attack (TIA), and cerebral infarction without residual deficits;  R41.82-Altered mental status,  unspecified.      COMPARISON: Chest x-ray 01/11/2021.     FINDINGS: Feeding tube advanced and now terminates within the proximal  portion of the transverse duodenum likely third portion of the duodenum.            Impression:      Feeding tube advanced and now terminates within the proximal  portion of the transverse duodenum likely third portion of the duodenum.     D:  01/12/2021  E:  01/12/2021     This report was finalized on 1/12/2021 5:05 PM by Dr. Sai Schwartz.       XR Abdomen KUB [[id number]] Collected: 01/11/21 1847     Updated: 01/12/21 1707    Narrative:      EXAMINATION: XR ABDOMEN KUB- 01/11/2021     INDICATION: Keofeed placement; R09.02-Hypoxemia; J18.9-Pneumonia,  unspecified organism; Z86.73-Personal history of transient ischemic  attack (TIA), and cerebral infarction without residual deficits;  R41.82-Altered mental status, unspecified      COMPARISON: 01/11/2021 earlier same day     FINDINGS: Feeding tube is coiled within the fundus of the stomach with  terminus in the proximal stomach near the GE junction more proximal than  on prior comparison.       Impression:      Feeding tube is coiled within the fundus of the stomach with  terminus in the proximal stomach near the GE junction more proximal than  on prior comparison.     D:  01/11/2021  E:  01/12/2021     This report was finalized on 1/12/2021 5:04 PM by Dr. Sai Schwartz.       XR Abdomen KUB [[id number]] Collected: 01/11/21 2249     Updated: 01/11/21 2251    Narrative:      CR Abdomen 1 Vw    INDICATION:   Tube placement    COMPARISON:   KUB from 1/11/2021 at 6:29 PM    FINDINGS:  AP radiographs of the abdomen. Feeding tube is coiled with tip in the stomach. Oral contrast is seen within the colon.      Impression:      Feeding tube is coiled with tip in the stomach. Findings is unchanged from prior.    Signer Name: Oliva Justice MD   Signed: 1/11/2021 10:49 PM   Workstation Name: [id number]    [facility]    XR Abdomen  ISSAC [[id number]] Collected: 01/11/21 0146     Updated: 01/11/21 0148    Narrative:      CR Abdomen 1 Vw    INDICATION:   Feeding tube placement.    COMPARISON:   1/10/2021    FINDINGS:  AP view of the abdomen. Feeding tube tip is in the antrum of the stomach. Bowel gas pattern is nonspecific. There is contrast in the colon, and there is colonic diverticulosis.      Impression:      Feeding tube tip in the antrum of the stomach.    Signer Name: Eddie Herron MD   Signed: 1/11/2021 1:46 AM   Workstation Name: [id number]    [facility]        Impression: H/O CVA  PNA  Change in MS  Dyspnea  GOC    Plan: Patient himself is not able to have any discussions.  No family at bedside.  We will plan on reaching out to family to have discussion about overall goals of care.        Alberto Mccormack DO  01/13/21  14:27 EST

## 2021-01-13 NOTE — PLAN OF CARE
Goal Outcome Evaluation:  Plan of Care Reviewed With: patient  Progress: no change   Patient is nonverbal and will intermittently follow commands. 4L NC, APRN notified of patients increase in oxygen needs and pt being unable to clear secretions, NT suctioning, CXR, robitussin, and RR culture ordered. Patient remains in restraints as he is trying to pull at lines. Became agitated and restless about 0315, ativan given per orders. BM x1 this shift. At time of this note, patient resting in bed. Denies any needs at this time.

## 2021-01-13 NOTE — PROGRESS NOTES
[facility] Medicine Services  PROGRESS NOTE    Patient Name: Angel Grace  : 1970  MRN: [id number]    Date of Admission: 2021  Primary Care Physician: Arlen Galvan MD    Subjective   Subjective     CC:  F/u respiratory failure, pneumonia    HPI:  Patient resting in bed. Course wet sounding secretions. Remains in restraints. Encephalopathic.    ROS:  Unable to obtain secondary to non-verbal status     Objective   Objective     Vital Signs:   Temp:  [97.9 °F (36.6 °C)-98.6 °F (37 °C)] 97.9 °F (36.6 °C)  Heart Rate:  [] 71  Resp:  [16-18] 18  BP: (111-168)/() 127/84  Total (NIH Stroke Scale): 23     Physical Exam:  Constitutional: No acute distress, awake, alert, morbidly obese  HENT: NCAT, mucous membranes dry, Keofeed in place  Respiratory: course breath sounds bilaterally with wet gurgling sounds today. No increased WOB  Cardiovascular: RRR, no murmurs, rubs, or gallops  Gastrointestinal: Positive bowel sounds  Musculoskeletal: No bilateral ankle edema  Neurologic: non-verbal, eyes closed, in restraints.  Skin: No rashes      Results Reviewed:  Results from last 7 days   Lab Units 21  0621  1825   WBC 10*3/mm3 17.54* 22.06* 19.37*   < > 18.96*   HEMOGLOBIN g/dL 13.1 12.8* 13.3   < > 13.7   HEMATOCRIT % 38.5 37.7 39.9   < > 39.8   PLATELETS 10*3/mm3 603* 555* 536*   < > 518*   INR   --   --   --   --  1.02   PROCALCITONIN ng/mL  --   --   --   --  0.06    < > = values in this interval not displayed.     Results from last 7 days   Lab Units 21  0821  0621  0621  1825   SODIUM mmol/L 140 139 135*   < > 138   POTASSIUM mmol/L 3.9 3.7 3.9   < > 3.4*   CHLORIDE mmol/L 103 100 98   < > 99   CO2 mmol/L 29.0 25.0 25.0   < > 28.0   BUN mg/dL 12 8 7   < > 7   CREATININE mg/dL 1.04 1.02 0.96   < > 1.28*   GLUCOSE mg/dL 209* 252* 168*   < > 83   CALCIUM mg/dL 8.6 8.6 8.6   < > 8.7   ALT (SGPT)  U/L 8  --   --   --  11   AST (SGOT) U/L 12  --   --   --  16   TROPONIN T ng/mL  --   --   --   --  0.017   PROBNP pg/mL  --   --   --   --  67.3    < > = values in this interval not displayed.     Estimated Creatinine Clearance: 109.9 mL/min (by C-G formula based on SCr of 1.04 mg/dL).    Microbiology Results Abnormal     Procedure Component Value - Date/Time    Respiratory Culture - Aspirate, Cough [[id number]] Collected: 01/12/21 2326    Lab Status: Preliminary result Specimen: Aspirate from Cough Updated: 01/13/21 0354     Gram Stain Moderate (3+) Epithelial cells per low power field      Few (2+) WBCs per low power field      No organisms seen    Blood Culture - Blood, Arm, Right [[id number]] Collected: 01/09/21 1820    Lab Status: Preliminary result Specimen: Blood from Arm, Right Updated: 01/12/21 1846     Blood Culture No growth at 3 days    Blood Culture - Blood, Hand, Left [[id number]] Collected: 01/09/21 1823    Lab Status: Preliminary result Specimen: Blood from Hand, Left Updated: 01/12/21 1846     Blood Culture No growth at 3 days    S. Pneumo Ag Urine or CSF - Urine, Urine, Clean Catch [[id number]]  (Normal) Collected: 01/12/21 1202    Lab Status: Final result Specimen: Urine, Clean Catch Updated: 01/12/21 1512     Strep Pneumo Ag Negative    MRSA Screen, PCR (Inpatient) - Swab, Nares [[id number]]  (Normal) Collected: 01/10/21 0052    Lab Status: Final result Specimen: Swab from Nares Updated: 01/10/21 0802     MRSA PCR Negative    Narrative:      MRSA Negative    COVID-19 and FLU A/B PCR - Swab, Nasopharynx [[id number]]  (Normal) Collected: 01/09/21 1825    Lab Status: Final result Specimen: Swab from Nasopharynx Updated: 01/09/21 2053     COVID19 Not Detected     Influenza A PCR Not Detected     Influenza B PCR Not Detected    Narrative:      Fact sheet for providers: https://www.fda.gov/media/526210/download    Fact sheet for patients: https://www.fda.gov/media/846351/download    Test performed by PCR.           Imaging Results (Last 24 Hours)     Procedure Component Value Units Date/Time    XR Chest 1 View [[id number]] Collected: 01/12/21 2232     Updated: 01/12/21 2234    Narrative:      CR Chest 1 Vw    INDICATION:   Increasing oxygen requirements. Hypoxemia.     COMPARISON:    None available.    FINDINGS:  Single portable AP view(s) of the chest.    Heart is mildly enlarged. Pulmonary vascularity is normal. The lungs are clear. No pneumothorax is seen. Tip of the feeding tube is not seen but is below the diaphragm.       Impression:      No acute cardiopulmonary findings.    Signer Name: Eddie Herron MD   Signed: 1/12/2021 10:32 PM   Workstation Name: [facility]    [facility]    XR Abdomen KUB [[id number]] Collected: 01/12/21 0810     Updated: 01/12/21 1708    Narrative:      EXAMINATION: XR ABDOMEN KUB-      INDICATION: KUB placement; R09.02-Hypoxemia; J18.9-Pneumonia,  unspecified organism; Z86.73-Personal history of transient ischemic  attack (TIA), and cerebral infarction without residual deficits;  R41.82-Altered mental status, unspecified.      COMPARISON: Chest x-ray 01/11/2021.     FINDINGS: Feeding tube advanced and now terminates within the proximal  portion of the transverse duodenum likely third portion of the duodenum.            Impression:      Feeding tube advanced and now terminates within the proximal  portion of the transverse duodenum likely third portion of the duodenum.     D:  01/12/2021  E:  01/12/2021     This report was finalized on 1/12/2021 5:05 PM by Dr. Sai Schwartz.       XR Abdomen KUB [[id number]] Collected: 01/11/21 1847     Updated: 01/12/21 1707    Narrative:      EXAMINATION: XR ABDOMEN KUB- 01/11/2021     INDICATION: Keofeed placement; R09.02-Hypoxemia; J18.9-Pneumonia,  unspecified organism; Z86.73-Personal history of transient ischemic  attack (TIA), and cerebral infarction without residual deficits;  R41.82-Altered mental status, unspecified       COMPARISON: 01/11/2021 earlier same day     FINDINGS: Feeding tube is coiled within the fundus of the stomach with  terminus in the proximal stomach near the GE junction more proximal than  on prior comparison.       Impression:      Feeding tube is coiled within the fundus of the stomach with  terminus in the proximal stomach near the GE junction more proximal than  on prior comparison.     D:  01/11/2021  E:  01/12/2021     This report was finalized on 1/12/2021 5:04 PM by Dr. Sai Schwartz.                  I have reviewed the medications:  Scheduled Meds:aspirin, 81 mg, Nasogastric, Daily  atorvastatin, 80 mg, Nasogastric, Nightly  carvedilol, 12.5 mg, Nasogastric, BID With Meals  cefepime, 2 g, Intravenous, Q8H  clopidogrel, 75 mg, Nasogastric, Daily  sennosides, 10 mL, Oral, BID    And  docusate sodium, 100 mg, Oral, BID  escitalopram, 10 mg, Nasogastric, Nightly  gabapentin, 400 mg, Nasogastric, TID  guaiFENesin, 200 mg, Oral, Q4H  heparin (porcine), 5,000 Units, Subcutaneous, Q8H  insulin regular, 0-7 Units, Subcutaneous, Q6H  insulin regular, 6 Units, Subcutaneous, Q6H  ipratropium-albuterol, 3 mL, Nebulization, 4x Daily - RT  lansoprazole, 15 mg, Per G Tube, Q AM  [START ON 1/14/2021] losartan, 50 mg, Oral, Q24H  metroNIDAZOLE, 500 mg, Oral, Q8H  sodium chloride, 10 mL, Intravenous, Q12H      Continuous Infusions:   PRN Meds:.•  acetaminophen  •  acetaminophen  •  dextrose  •  dextrose  •  Fleets  •  glucagon (human recombinant)  •  glycopyrrolate  •  ipratropium-albuterol  •  labetalol  •  LORazepam  •  melatonin  •  Morphine  •  ondansetron  •  oxyCODONE-acetaminophen  •  polyethylene glycol  •  [COMPLETED] Insert peripheral IV **AND** sodium chloride  •  sodium chloride    Assessment/Plan   Assessment & Plan     Active Hospital Problems    Diagnosis  POA   • **Acute respiratory failure with hypoxia (CMS/HCC) [J96.01]  Yes   • Sepsis due to pneumonia (CMS/HCC) [J18.9, A41.9]  Yes   • Hypokalemia [E87.6]   Yes   • APRIL (acute kidney injury) (CMS/[facility]) [N17.9]  Yes   • History of CVA (cerebrovascular accident) [Z86.73]  Not Applicable   • GERD without esophagitis [K21.9]  Yes   • DM2 (diabetes mellitus, type 2) (CMS/[facility]) [E11.9]  Yes   • CAD (coronary artery disease) [I25.10]  Yes   • CKD (chronic kidney disease) [N18.9]  Yes   • Tobacco abuse [Z72.0]  Yes   • AMS (altered mental status) [R41.82]  Yes   • Essential hypertension [I10]  Yes   • HLD (hyperlipidemia) [E78.5]  Yes      Resolved Hospital Problems   No resolved problems to display.        Brief Hospital Course to date:  Angel Grace is a 50 y.o. male with a PMH of HLD, HTN, CAD, PVD, T2DM, CKD, GERD, s/p splenectomy, and ongoing tobacco abuse with recent CVA who was admitted on 1/9/21 from [facility] for hypoxia and unresponsiveness.    Acute respiratory failure with hypoxia   Pneumonia - suspect aspiration secondary to difficulty with secretions/dysphagia  Sepsis - POA with tachycardia, leukocytosis, fever   - CTA chest without PE, has patchy bilateral airspace disease, Covid negative   - Continue supplemental oxygen and nebs as needed   - Continue nasotracheal suctioning PRN, add PRN robinul   - Strep pneumo negative, blood cultures negative  - change Meropenem to Cefepime/Flagyl. PCN allergic. See no need for carbapenem currently, but will cover for aspiration and possible HCAP, MRSA PCR negative  - leukocytosis improving    Encephalopathy:  - combination of infection and bilateral strokes (R/L corona radiata strokes on CT head 1/9)- per sister patient had stroke back in October 2020 with ataxia, at that time he refused to go to the ED, she then found him on the ground, unresponsive and non-verbal last week. He was admitted to [facility] for 3-4 days and transferred to [facility] for post-stroke rehab. She states after his most recent stroke he remained non-verbal and was needing assistance with walking.  - given continue encephalopathy, will get MRI  brain. Consider neuro consult  - palliative care consult for continued GOC discussions and family support    APRIL  - resolved, continue to monitor     CVA w/Dysphagia w/Keofeed  - Continue tube feeds, RD following  - Tox screen +cocaine - presume this was positive prior to his admission at [place] as he has been in rehab at [facility] since stroke  - Resume ASA, Plavix and statin per NG tube     Hypokalemia   - Replete per protocol     T2DM  - schedule regular insulin Q6hrs while on tube feeds      HTN  - Resume Coreg and losartan      GERD - resume PPI     DVT Prophylaxis:  Heparin     Disposition: I expect the patient to be discharged pending improvement, updated patient's sister Mile Arguello via telephone again today. Palliative care consult    CODE STATUS:   Code Status and Medical Interventions:   Ordered at: 01/09/21 7029     Level Of Support Discussed With:    Patient     Code Status:    CPR     Medical Interventions (Level of Support Prior to Arrest):    Full       Taylor Salmeron, DO  01/13/21    >35 minutes spent on patient care, this includes chart review, discussion with specialists, Case Management and nursing staff, as well as patient and family. more than 50% of time spent face to face counseling patient on current illness and plan of care.

## 2021-01-14 PROBLEM — I63.9 BRAINSTEM STROKE (HCC): Status: ACTIVE | Noted: 2021-01-01

## 2021-01-14 NOTE — PROGRESS NOTES
Case Management Discharge Note      Final Note: Pt was admitted to inpatient Hospice today.         Selected Continued Care - Discharged on 1/14/2021 Admission date: 1/9/2021 - Discharge disposition: Hospice/Medical Facility ([facility])    Destination    No services have been selected for the patient.              Durable Medical Equipment    No services have been selected for the patient.              Dialysis/Infusion    No services have been selected for the patient.              Home Medical Care    No services have been selected for the patient.              Therapy    No services have been selected for the patient.              Community Resources    No services have been selected for the patient.                       Final Discharge Disposition Code: 51 - hospice medical facility

## 2021-01-14 NOTE — PROGRESS NOTES
Continued Stay Note  [facility]     Patient Name: Angel Grace  MRN: [id number]  Today's Date: 1/14/2021    Admit Date: 1/9/2021    Discharge Plan     Row Name 01/14/21 1256       Plan    Plan Waiting on Admission Paperwork    Plan Comments Spoke with sister by phone. She is agreeable to comfort measures and inpatient hospice. SW sent admission paperwork. Once hospice team receives paperwork, patient will then transition to inpatient hospice.    If inpatient hospice team can be of assistance, please call [phone].        Discharge Codes    No documentation.             SEEMA Martin

## 2021-01-14 NOTE — PROGRESS NOTES
[facility] Medicine Services  PROGRESS NOTE    Patient Name: Angel Grace  : 1970  MRN: [id number]    Date of Admission: 2021  Primary Care Physician: Arlen Galvan MD    Subjective   Subjective     CC:  F/u respiratory failure, pneumonia    HPI:  Patient with decompensation overnight. CT head revealed brainstem stroke. He remains altered, right hemiparesis. Respirations are more stable today.    ROS:  Unable to obtain secondary to non-verbal status     Objective   Objective     Vital Signs:   Temp:  [99.1 °F (37.3 °C)-100 °F (37.8 °C)] 100 °F (37.8 °C)  Heart Rate:  [80-96] 92  Resp:  [3-21] 18  BP: (105-171)/(64-93) 171/93  Total (NIH Stroke Scale): 30     Physical Exam:  Constitutional: No acute distress, awake, alert, morbidly obese  HENT: NCAT, mucous membranes dry, Keofeed in place  Respiratory: course breath sounds bilaterally with wet gurgling sounds are some better. No increased WOB  Cardiovascular: RRR, no murmurs, rubs, or gallops  Gastrointestinal: Positive bowel sounds  Musculoskeletal: No bilateral ankle edema  Neurologic: non-verbal, eyes are open, does not track, right hemiparesis, left side without purposeful movements.  Skin: No rashes      Results Reviewed:  Results from last 7 days   Lab Units 21  0830 21  0621  1825   WBC 10*3/mm3 17.54* 22.06* 19.37*   < > 18.96*   HEMOGLOBIN g/dL 13.1 12.8* 13.3   < > 13.7   HEMATOCRIT % 38.5 37.7 39.9   < > 39.8   PLATELETS 10*3/mm3 603* 555* 536*   < > 518*   INR   --   --   --   --  1.02   PROCALCITONIN ng/mL  --   --   --   --  0.06    < > = values in this interval not displayed.     Results from last 7 days   Lab Units 21  0830 21  0606 21  0621  1825   SODIUM mmol/L 140 139 135*   < > 138   POTASSIUM mmol/L 3.9 3.7 3.9   < > 3.4*   CHLORIDE mmol/L 103 100 98   < > 99   CO2 mmol/L 29.0 25.0 25.0   < > 28.0   BUN mg/dL 12 8 7   < > 7   CREATININE  mg/dL 1.04 1.02 0.96   < > 1.28*   GLUCOSE mg/dL 209* 252* 168*   < > 83   CALCIUM mg/dL 8.6 8.6 8.6   < > 8.7   ALT (SGPT) U/L 8  --   --   --  11   AST (SGOT) U/L 12  --   --   --  16   TROPONIN T ng/mL  --   --   --   --  0.017   PROBNP pg/mL  --   --   --   --  67.3    < > = values in this interval not displayed.     Estimated Creatinine Clearance: 109.9 mL/min (by C-G formula based on SCr of 1.04 mg/dL).    Microbiology Results Abnormal     Procedure Component Value - Date/Time    Respiratory Culture - Aspirate, Cough [[id number]] Collected: 01/12/21 2326    Lab Status: Final result Specimen: Aspirate from Cough Updated: 01/14/21 1142     Respiratory Culture Scant growth (1+) Normal Respiratory Nina: NO S.aureus/MRSA or Pseudomonas aeruginosa     Gram Stain Moderate (3+) Epithelial cells per low power field      Few (2+) WBCs per low power field      No organisms seen    Blood Culture - Blood, Arm, Right [[id number]] Collected: 01/09/21 1820    Lab Status: Preliminary result Specimen: Blood from Arm, Right Updated: 01/13/21 1846     Blood Culture No growth at 4 days    Blood Culture - Blood, Hand, Left [[id number]] Collected: 01/09/21 1823    Lab Status: Preliminary result Specimen: Blood from Hand, Left Updated: 01/13/21 1846     Blood Culture No growth at 4 days    S. Pneumo Ag Urine or CSF - Urine, Urine, Clean Catch [[id number]]  (Normal) Collected: 01/12/21 1202    Lab Status: Final result Specimen: Urine, Clean Catch Updated: 01/12/21 1512     Strep Pneumo Ag Negative    MRSA Screen, PCR (Inpatient) - Swab, Nares [[id number]]  (Normal) Collected: 01/10/21 0052    Lab Status: Final result Specimen: Swab from Nares Updated: 01/10/21 0802     MRSA PCR Negative    Narrative:      MRSA Negative    COVID-19 and FLU A/B PCR - Swab, Nasopharynx [[id number]]  (Normal) Collected: 01/09/21 1825    Lab Status: Final result Specimen: Swab from Nasopharynx Updated: 01/09/21 2053     COVID19 Not Detected     Influenza A PCR  Not Detected     Influenza B PCR Not Detected    Narrative:      Fact sheet for providers: https://www.fda.gov/media/202160/download    Fact sheet for patients: https://www.fda.gov/media/951610/download    Test performed by PCR.          Imaging Results (Last 24 Hours)     Procedure Component Value Units Date/Time    CT Cervical Spine With Contrast [[id number]] Collected: 01/14/21 0147     Updated: 01/14/21 0149    Narrative:      CT Spine Cervical W    INDICATION:   Patient found unresponsive. Stroke. Possible infection.    TECHNIQUE:   CT of the cervical spine with IV contrast. Coronal and sagittal reconstructions were obtained.  Radiation dose reduction techniques included automated exposure control or exposure modulation based on body size. Count of known CT and cardiac nuc med  studies performed in previous 12 months: 3.     COMPARISON:  None available.    FINDINGS:  There is artifact in the mid to lower cervical spine due to positioning and body habitus. Cervical alignment is normal with no subluxation identified. No acute cervical spine fracture is seen. There is no pathologic contrast enhancement. There are no  definite erosive changes identified to suggest discitis or osteomyelitis.    At C2-3, there is a broad-based disc osteophyte complex eccentric to the left. This is causing severe narrowing of the central canal and probably some flattening of the cord. There is also left side foraminal stenosis.    At C3-4, there is no significant disc bulging. There is bilateral foraminal narrowing, left greater than right due to uncovertebral spurring. No central canal stenosis.    At C4-5, there is a broad-based disc osteophyte complex with mild left foraminal stenosis and mild to moderate central stenosis.    At C5-6, there is a larger broad-based disc osteophyte complex with facet arthropathy. There is severe right foraminal stenosis and moderate central stenosis.    At C6-7, there is a broad-based disc osteophyte  complex with moderate to severe bilateral foraminal stenosis. There is artifact at this level. I cannot exclude a large central disc herniation with severe central stenosis. This could be better assessed  with MRI if patient is a candidate.    At C7-T1, the disc is normal.      Impression:        1. No fracture or subluxation.  2. No pathologic enhancement. No definite evidence of osteomyelitis or discitis.  3. Multilevel degenerative disease as above resulting in foraminal stenosis and central canal stenosis where indicated. There does appear to be severe central stenosis at C2-3 and possibly at C6-7. Consider MRI for follow-up if patient is a candidate.    Signer Name: Eddie Herron MD   Signed: 1/14/2021 1:47 AM   Workstation Name: [email or web address]    [facility]    CT Angiogram Head [[id number]] Collected: 01/14/21 0109     Updated: 01/14/21 0111    Narrative:      CTA Head, CTA Neck    INDICATION:   Stroke. Right-sided weakness. Abnormal head CT.    TECHNIQUE:   CT angiogram of the head and neck with and without IV contrast. 3-D reconstructions were obtained and reviewed.  Evaluation for a significant carotid arterial stenosis is based on the NASCET criteria. Radiation dose reduction techniques included  automated exposure control or exposure modulation based on body size. Count of known CT and cardiac nuc med studies performed in previous 12 months: 3.     COMPARISON:   Head CT same day    FINDINGS:   CTA neck:  Visualized upper lungs are clear. The arch and great vessel origins are within normal limits. Vertebral arteries are widely patent. Left side is slightly dominant. No carotid plaque disease is identified. There is no carotid stenosis. There is  no carotid or vertebral arterial dissection.    CTA head:  Vertebrobasilar system is normal. The cavernous and supraclinoid ICAs are patent. The right A1 segment is mildly hypoplastic. There appears to be a high-grade stenosis in the proximal  left A2 segment. The anterior communicator is patent. There  is a moderate to high-grade stenosis in the right proximal M1 over a length of about 4 mm. Multifocal areas of scattered atherosclerotic narrowing or possibly vasculitis are identified in the middle and posterior cerebral arteries on both sides. There  are multiple moderate stenoses in the P2 segments on both sides. No focal vessel cut off is identified. No intracranial aneurysm is seen. Major dural venous sinuses are patent. Please see head CT report dictated separately.      Impression:        1. Normal CT angiogram of the neck.  2. Multifocal areas of moderate to high-grade stenosis intracranially as above with scattered areas of mild stenoses noted fairly diffusely. Findings may all be secondary to diffuse atherosclerotic disease, but vasculitis is not excluded. No focal large  vessel occlusion is seen. No aneurysm identified.    Signer Name: Eddie Herron MD   Signed: 1/14/2021 1:09 AM   Workstation Name: [email or web address]    [facility]    CT Angiogram Neck [[id number]] Collected: 01/14/21 0109     Updated: 01/14/21 0111    Narrative:      CTA Head, CTA Neck    INDICATION:   Stroke. Right-sided weakness. Abnormal head CT.    TECHNIQUE:   CT angiogram of the head and neck with and without IV contrast. 3-D reconstructions were obtained and reviewed.  Evaluation for a significant carotid arterial stenosis is based on the NASCET criteria. Radiation dose reduction techniques included  automated exposure control or exposure modulation based on body size. Count of known CT and cardiac nuc med studies performed in previous 12 months: 3.     COMPARISON:   Head CT same day    FINDINGS:   CTA neck:  Visualized upper lungs are clear. The arch and great vessel origins are within normal limits. Vertebral arteries are widely patent. Left side is slightly dominant. No carotid plaque disease is identified. There is no carotid stenosis. There is  no  carotid or vertebral arterial dissection.    CTA head:  Vertebrobasilar system is normal. The cavernous and supraclinoid ICAs are patent. The right A1 segment is mildly hypoplastic. There appears to be a high-grade stenosis in the proximal left A2 segment. The anterior communicator is patent. There  is a moderate to high-grade stenosis in the right proximal M1 over a length of about 4 mm. Multifocal areas of scattered atherosclerotic narrowing or possibly vasculitis are identified in the middle and posterior cerebral arteries on both sides. There  are multiple moderate stenoses in the P2 segments on both sides. No focal vessel cut off is identified. No intracranial aneurysm is seen. Major dural venous sinuses are patent. Please see head CT report dictated separately.      Impression:        1. Normal CT angiogram of the neck.  2. Multifocal areas of moderate to high-grade stenosis intracranially as above with scattered areas of mild stenoses noted fairly diffusely. Findings may all be secondary to diffuse atherosclerotic disease, but vasculitis is not excluded. No focal large  vessel occlusion is seen. No aneurysm identified.    Signer Name: Eddie Herron MD   Signed: 1/14/2021 1:09 AM   Workstation Name: [email or web address]    [facility]    CT Cerebral Perfusion With & Without Contrast [[id number]] Collected: 01/14/21 0053     Updated: 01/14/21 0055    Narrative:      CT CEREBRAL PERFUSION W WO CONTRAST    HISTORY:   Follow-up stroke. New right-sided weakness.    TECHNIQUE:   Axial CT images of the brain without and with intravenous contrast using cerebral perfusion protocol. Post-processing parametric maps were created using RAPID software and reviewed. Radiation dose reduction techniques included automated exposure control  or exposure modulation based on body size. CT and nuclear cardiology exams in the last 12 months: 3.    COMPARISON:   Head CT same day    FINDINGS:   Arterial input function  is optimal.     PERFUSION PARAMETERS:  *  Ischemic tissue volume (Tmax > 6 sec.): 21 mL.  *  Infarct core volume (CBF < 30%): 18 mL.  *  Mismatch (penumbra) volume: 3 mL.  *  Mismatch ratio: 1.2.  *  Location/territory: Left frontoparietal MCA distribution.    COLLATERAL CIRCULATION PARAMETERS:  *  Volume poor collateral perfusion (Tmax > 10 sec.): 5 mL.  *  Hypoperfusion index (Tmax >10 sec./Tmax > 6 sec.): 0.2.  *  CBV Index (rCBV in Tmax > 6 sec. region): 0.5.      Impression:      Abnormal examination showing an 18 mL infarct core in the left MCA distribution with a surrounding 3 mL area of ischemic tissue.          Signer Name: Eddie Herron MD   Signed: 1/14/2021 12:53 AM   Workstation Name: [email or web address]    [facility]    CT Head Without Contrast [[id number]] Collected: 01/14/21 0038     Updated: 01/14/21 0040    Narrative:      CT Head WO    HISTORY:   Follow-up stroke. Right-sided weakness.    TECHNIQUE:   Axial unenhanced head CT with multiplanar reformats. Radiation dose reduction techniques included automated exposure control or exposure modulation based on body size. Count of known CT and cardiac nuc med studies performed in previous 12 months: 3.     COMPARISON:   1/9/2021    FINDINGS:   There is generalized atrophy. Ventricular size and configuration are within normal limits. Old bilateral basal ganglia and thalamic lacunar infarcts are again noted. There is an evolving large subacute infarct in the left MCA distribution. Additionally,  there is a new subacute infarct in the lower abelardo/upper medulla measuring about 8 mm in diameter.  No acute hemorrhage is identified. There are no masses. There is no skull fracture. Mastoid air cells and visualized paranasal sinuses remain clear.      Impression:        1. Continued evolution of the large subacute left MCA distribution infarct.  2. Now seen is an 8 mm subacute infarct in the brainstem.  3. Chronic changes in the brain as  above.  4. No acute hemorrhage.    Signer Name: Eddie Herron MD   Signed: 1/14/2021 12:38 AM   Workstation Name: [email or web address]    [facility] [place]               I have reviewed the medications:  Scheduled Meds:acetaZOLAMIDE, 500 mg, Intravenous, Once  aspirin, 81 mg, Nasogastric, Daily  atorvastatin, 80 mg, Nasogastric, Nightly  cefepime, 2 g, Intravenous, Q8H  clopidogrel, 75 mg, Nasogastric, Daily  sennosides, 10 mL, Oral, BID    And  docusate sodium, 100 mg, Oral, BID  escitalopram, 10 mg, Nasogastric, Nightly  guaiFENesin, 200 mg, Oral, Q4H  heparin (porcine), 5,000 Units, Subcutaneous, Q8H  insulin regular, 0-7 Units, Subcutaneous, Q6H  insulin regular, 6 Units, Subcutaneous, Q6H  ipratropium-albuterol, 3 mL, Nebulization, 4x Daily - RT  lansoprazole, 15 mg, Per G Tube, Q AM  metroNIDAZOLE, 500 mg, Oral, Q8H  sodium chloride, 10 mL, Intravenous, Q12H      Continuous Infusions:   PRN Meds:.•  acetaminophen  •  acetaminophen  •  dextrose  •  dextrose  •  Fleets  •  glucagon (human recombinant)  •  glycopyrrolate  •  ipratropium-albuterol  •  labetalol  •  LORazepam  •  melatonin  •  Morphine  •  naloxone  •  ondansetron  •  oxyCODONE-acetaminophen  •  polyethylene glycol  •  [COMPLETED] Insert peripheral IV **AND** sodium chloride  •  sodium chloride    Assessment/Plan   Assessment & Plan     Active Hospital Problems    Diagnosis  POA   • **Acute respiratory failure with hypoxia (CMS/[facility]) [J96.01]  Yes   • Sepsis due to pneumonia (CMS/[facility]) [J18.9, A41.9]  Yes   • Hypokalemia [E87.6]  Yes   • APRIL (acute kidney injury) (CMS/[facility]) [N17.9]  Yes   • History of CVA (cerebrovascular accident) [Z86.73]  Not Applicable   • GERD without esophagitis [K21.9]  Yes   • DM2 (diabetes mellitus, type 2) (CMS/[facility]) [E11.9]  Yes   • CAD (coronary artery disease) [I25.10]  Yes   • CKD (chronic kidney disease) [N18.9]  Yes   • Tobacco abuse [Z72.0]  Yes   • AMS (altered mental status) [R41.82]  Yes   • Essential  hypertension [I10]  Yes   • HLD (hyperlipidemia) [E78.5]  Yes      Resolved Hospital Problems   No resolved problems to display.        Brief Hospital Course to date:  Angel Grace is a 50 y.o. male with a PMH of HLD, HTN, CAD, PVD, T2DM, CKD, GERD, s/p splenectomy, and ongoing tobacco abuse with recent CVA who was admitted on 1/9/21 from [facility] for hypoxia and unresponsiveness.    Acute respiratory failure with hypoxia   Pneumonia - suspect aspiration secondary to difficulty with secretions/dysphagia  Sepsis - POA with tachycardia, leukocytosis, fever   - CTA chest without PE, has patchy bilateral airspace disease, Covid negative   - Continue supplemental oxygen and nebs as needed   - Continue nasotracheal suctioning PRN, add PRN robinul   - Strep pneumo negative, blood cultures negative  - change Meropenem to Cefepime/Flagyl. PCN allergic. See no need for carbapenem currently, but will cover for aspiration and possible HCAP, MRSA PCR negative  - leukocytosis improving    L MCA infarction  Brainstem Infarct  - see below, family desires palliative approach and hospice   - discussed with Dr. Ventura this morning, given that family does not desire aggressive work-up will defer MRI and transfer to the ICU    APRIL  - resolved, continue to monitor     CVA w/Dysphagia w/Keofeed  - Continue tube feeds, RD following  - Tox screen +cocaine - presume this was positive prior to his admission at [place] as he has been in rehab at [facility] since stroke  - Resume ASA, Plavix and statin per NG tube     Hypokalemia   - Replete per protocol     T2DM  - schedule regular insulin Q6hrs while on tube feeds      HTN  - Resume Coreg and losartan      GERD - resume PPI     DVT Prophylaxis:  Heparin     Disposition: Hospice consult    Long discussion with patient's sister. She states she is patient's only family (no children or wife) and that patient would not want to live like this. Does not want PEG tube or aggressive measures.  Would like to focus on quality and comfort. We discussed hospice care and sister agreeable with that plan. Very tearful but thankful for update.     CODE STATUS:   Code Status and Medical Interventions:   Ordered at: 01/14/21 1158     Limited Support to NOT Include:    Cardioversion/Defibrillation    Intubation     Level Of Support Discussed With:    Health Care Surrogate     Code Status:    No CPR     Medical Interventions (Level of Support Prior to Arrest):    Limited       Taylor Salmeron DO  01/14/21    >35 minutes spent on patient care, this includes chart review, discussion with specialists, Case Management and nursing staff, as well as patient and family. more than 50% of time spent face to face counseling patient on current illness and plan of care.

## 2021-01-14 NOTE — DISCHARGE PLACEMENT REQUEST
"Angel Gutierrez (50 y.o. Male)     Date of Birth Social Security Number Address Home Phone MRN    1970  [id number]  [place] [address] [phone] [id number]    Zoroastrian Marital Status          None Single       Admission Date Admission Type Admitting Provider Attending Provider Department, Room/Bed    21 Emergency Taylor Salmeron DO Barbato, Hayley R, DO [facility] 3F, S320/1    Discharge Date Discharge Disposition Discharge Destination                       Attending Provider: Taylor Salmeron DO    Allergies: Penicillins    Isolation: None   Infection: None   Code Status: No CPR    Ht: 188 cm (74.02\")   Wt: 108 kg (238 lb)    Admission Cmt: None   Principal Problem: Acute respiratory failure with hypoxia (CMS/HCC) [J96.01]                 Active Insurance as of 2021     Primary Coverage     Payor Plan Insurance Group Employer/Plan Group    [facility] MEDICAID      Payor Plan Address Payor Plan Phone Number Payor Plan Fax Number Effective Dates    [address] [address] [phone]  2021 - None Entered    [facility] [id number]       Subscriber Name Subscriber Birth Date Member ID       ANGEL GUTIERREZ 1970 [id number]                 Emergency Contacts      (Rel.) Home Phone Work Phone Mobile Phone    Mile Arguello (Sister) [phone] -- --            Emergency Contact Information     Name Relation Home Work Mobile    Mile Arguello Sister [phone]            Insurance Information                [facility] MEDICAID Phone: [phone]    Subscriber: Angel Gutierrez Subscriber#: [id number]    Group#:  Precert#:              History & Physical      Purvi Cardona MD at 21 2315              [facility] Medicine Services  HISTORY AND PHYSICAL    Patient Name: Angel Gutierrez  : 1970  MRN: [id number]  Primary Care Physician: Provider, No Known  Date of admission: " "1/9/2021    Ángela Ba PA-C 01/09/21 11:15 PM EST     Subjective   Subjective     Chief Complaint:  AMS    HPI:  Angel Grace is a 50 y.o. male with a past medical history significant for HLD, HTN, CAD, PVD, DM2, CKD, GERD, s/p splenectomy, and ongoing tobacco abuse. He presents today with acute change in mental status from [facility]. Records reviewed indicate that patient typically responds by tracking his eyes and shaking his head yes or no. Apparently he is less responsive and can longer follow commands. Also noted to be hypoxic with oxygen saturations down to mid 80's on room air. HPI limited secondary to patient disposition. There are no complaints of fever, cough, congestion, or syncope. Patient initially presented to [facility] ED on 1/3/21 after being found crawling on all 4's by family. He was severely aphasic with right facial droop. He has a history of CVA X2, most recently in October 2020, but was otherwise \"normal, independent, and conversant\" a month ago when he was last seen by family. CT imaging of head on 1/3 showed new region of low attenuation within the left frontal periventricular white matter suggesting ischemia of unknown chronicity. UDS was positive for cocaine at that time. He was subsequently admitted for MRI and evaluation by neurology. Patient improved and was discharged to [facility] from where he presents today with a Keofeed and persistent aphasia.       Current COVID Risks are:  [] Fever []  Cough [] Shortness of breath [] Fatigue [] Change in taste or smell    [] Exposure to COVID positive patient  [x] High risk facility   []  NONE    Review of Systems     All other systems reviewed and are negative.     Personal History     Past Medical History:   Diagnosis Date   • CAD (coronary artery disease) 1/9/2021   • CKD (chronic kidney disease) 1/9/2021   • DM2 (diabetes mellitus, type 2) (CMS/Formerly [facility]) 1/9/2021   • Essential hypertension 1/9/2021   • GERD without " "esophagitis 1/9/2021   • History of CVA (cerebrovascular accident) 1/9/2021   • HLD (hyperlipidemia) 1/9/2021       Past Surgical History:   Procedure Laterality Date   • SPLENECTOMY         Family History: family history is not on file. Otherwise pertinent FHx was reviewed and unremarkable.     Social History:  reports that he has been smoking cigarettes. He does not have any smokeless tobacco history on file. Alcohol use questions deferred to the physician. He reports current drug use. Drug: \"Crack\" cocaine.  Social History     Social History Narrative   • Not on file       Medications:       Allergies   Allergen Reactions   • Penicillins Other (See Comments)     unknown       Objective   Objective     Vital Signs:   Temp:  [98 °F (36.7 °C)-100.3 °F (37.9 °C)] 100 °F (37.8 °C)  Heart Rate:  [102-120] 102  Resp:  [16-18] 18  BP: (138-183)/() 138/117  Flow (L/min):  [1-2] 2    Physical Exam   Constitutional: Awake, alert, but disoriented and severely aphasic, unable to respond to commands,   Eyes: PERRLA, sclerae anicteric, no conjunctival injection  HENT: NCAT, mucous membranes moist  Neck: Supple, no thyromegaly, no lymphadenopathy, trachea midline  Respiratory:  nonlabored respirations, crackles in bases bilaterally  Cardiovascular: RRR, no murmurs, rubs, or gallops, palpable pedal pulses bilaterally  Gastrointestinal: Positive bowel sounds, soft, nontender, nondistended  Musculoskeletal: No bilateral ankle edema, no clubbing or cyanosis to extremities  Psychiatric: deffered  Neurologic: strength symmetric in all extremities but overall weak, Cranial Nerves grossly intact to confrontation, nonverbal  Skin: No rashes       Results Reviewed:  I have personally reviewed most recent indicated data and agree with findings including:  [x]  Laboratory  [x]  Radiology  [x]  EKG/Telemetry  []  Pathology  [x]  Cardiac/Vascular Studies  [x]  Old records  []  Other:  Most pertinent findings include: vitals stable. " Potassium 3.4. WBC 18.9. chemistry and hematology otherwise favorable. CTA chest negative for PE but notes bilateral pneumonia. CT imaging of head shows Subacute or old 3 cm infarct in the left corona radiata and there is a smaller area of old ischemic change in the right corona radiata.      LAB RESULTS:      Lab 01/09/21 1825   WBC 18.96*   HEMOGLOBIN 13.7   HEMATOCRIT 39.8   PLATELETS 518*   NEUTROS ABS 13.47*   IMMATURE GRANS (ABS) 0.07*   LYMPHS ABS 3.24*   MONOS ABS 2.00*   EOS ABS 0.08   MCV 89.4   PROCALCITONIN 0.06   LACTATE 0.9   PROTIME 13.1         Lab 01/09/21  1825   SODIUM 138   POTASSIUM 3.4*   CHLORIDE 99   CO2 28.0   ANION GAP 11.0   BUN 7   CREATININE 1.28*   GLUCOSE 83   CALCIUM 8.7         Lab 01/09/21  1825   TOTAL PROTEIN 7.6   ALBUMIN 3.60   GLOBULIN 4.0   ALT (SGPT) 11   AST (SGOT) 16   BILIRUBIN 0.6   ALK PHOS 100         Lab 01/09/21  1825   PROBNP 67.3   TROPONIN T 0.017   PROTIME 13.1   INR 1.02                 Brief Urine Lab Results  (Last result in the past 365 days)      Color   Clarity   Blood   Leuk Est   Nitrite   Protein   CREAT   Urine HCG        01/09/21 1853 Dark Yellow Clear Negative Negative Negative >=300 mg/dL (3+)             Microbiology Results (last 10 days)     Procedure Component Value - Date/Time    COVID-19 and FLU A/B PCR - Swab, Nasopharynx [[id number]]  (Normal) Collected: 01/09/21 1825    Lab Status: Final result Specimen: Swab from Nasopharynx Updated: 01/09/21 2053     COVID19 Not Detected     Influenza A PCR Not Detected     Influenza B PCR Not Detected    Narrative:      Fact sheet for providers: https://www.fda.gov/media/959735/download    Fact sheet for patients: https://www.fda.gov/media/082241/download    Test performed by PCR.          Ct Head Without Contrast    Result Date: 1/9/2021  CT Head WO HISTORY: Recent stroke, altered mental status today TECHNIQUE: Axial unenhanced head CT. Radiation dose reduction techniques included automated exposure  control or exposure modulation based on body size. Count of known CT and cardiac nuc med studies performed in previous 12 months: 0. Time of scan: 11:00 PM COMPARISON: None. FINDINGS: The ventricles and subarachnoid spaces are normal. There is a large area of decreased density in the left anterior corona radiata consistent with a subacute infarct. This measures 3 cm in diameter. There is also old ischemic change in the right posterior corona radiata. No hemorrhage is identified. There are no extra-axial fluid collections.     Impression: Subacute or old 3 cm infarct in the left corona radiata and there is a smaller area of old ischemic change in the right corona radiata. No acute findings are identified.. Signer Name: Hamilton Vaz MD  Signed: 1/9/2021 7:23 PM  Workstation [facility]: [facility]    Ct Chest Without Contrast Diagnostic    Result Date: 1/9/2021  CT Chest WO INDICATION: Hypoxia and upper respiratory congestion today. Recent stroke TECHNIQUE: CT of the thorax without IV contrast. Coronal and sagittal reconstructions were obtained.  Radiation dose reduction techniques included automated exposure control or exposure modulation based on body size. Count of known CT and cardiac nuc med studies performed in previous 12 months: 0. COMPARISON: None available. FINDINGS: There is minimal interstitial infiltrate in the lingula and left lower lobe. Rest the lungs are clear. Thyroid gland is normal. Aorta is normal in size. There is no adenopathy. Upper abdominal images are unremarkable. Feeding tube has its tip in the stomach. Bones are unremarkable.     Impression: Minimal patchy infiltrate in the lingula with mild interstitial prominence in the left base. Otherwise lungs are clear Signer Name: Hamilton Vaz MD  Signed: 1/9/2021 7:25 PM  Workstation Name: [employer] [facility]    Ct Angiogram Chest    Result Date: 1/9/2021  INDICATION: Shortness of breath  generalized weakness evaluate for PE. Patient had earlier noncontrast chest CT for shortness of breath. TECHNIQUE: CT angiogram of the chest with contrast. 3-D reformatted images were acquired and reviewed. Intravenous contrast dose recorded in the patient's chart Radiation dose reduction techniques included automated exposure control or exposure modulation based on body size. Radiation audit for number of CT and nuclear cardiology exams performed in the last year:  1. There is limitation of study by the patient's inability to raise arms resulting in artifact. COMPARISON: There is an earlier noncontrast chest CT which is not adequate to evaluate for suspected pulmonary embolism. FINDINGS: There is no convincing evidence for pulmonary embolus. The more peripheral pulmonary arterial system is suboptimally assessed due to some breathing motion artifact and streak artifact from patient arms. There is no evidence for thoracic aortic dissection. There is a feeding tube in the stomach. There is no axillary lymphadenopathy. There are shotty mediastinal lymph nodes and mild thickening of central bronchovascular soft tissues bilaterally. There is no pleural or pericardial effusion. There is no pneumothorax. There is patchy airspace disease in the right upper lobe posteriorly. There is mild multifocal patchy airspace disease in the left greater the right lower lobe and in the lingula. Please correlate for clinical evidence of Covid pneumonia. This is mildly progressed from prior. Imaging features can be seen with COVID-19 pneumonia, although are nonspecific and can can occur with a variety of infectious and noninfectious processes. . Follow-up recommended. The patient is postcholecystectomy. There is streak artifact from contrast in the visualized colon.     Impression: 1. No convincing evidence for pulmonary embolus. There is some technical limitation for evaluation of the peripheral pulmonary arterial vessels. 2. Patchy  bilateral airspace disease is mildly progressed from earlier. 3. Mild shotty mediastinal lymphadenopathy is nonspecific. Signer Name: Ines Lucas MD  Signed: 1/9/2021 9:29 PM  Workstation Name: [email or web address]  [facility]           Assessment/Plan   Assessment & Plan       Acute respiratory failure with hypoxia (CMS/HCC)    Sepsis due to pneumonia (CMS/HCC)    APRIL (acute kidney injury) (CMS/HCC)    AMS (altered mental status)    Hypokalemia    DM2 (diabetes mellitus, type 2) (CMS/HCC)    CAD (coronary artery disease)    CKD (chronic kidney disease)    Essential hypertension    HLD (hyperlipidemia)    History of CVA (cerebrovascular accident)    GERD without esophagitis    Tobacco abuse    Angel Grace is a 50 y.o. male with a past medical history significant for HLD, HTN, CAD, PVD, DM2, CKD, GERD, s/p splenectomy, and ongoing tobacco abuse with recent stroke, at [facility] for rehab - they sent him to ED for hypoxia, unresponsiveness.    1. Acute Respiratory Failure with Hypoxia:  - secondary to pneumonia with sepsis. COVID PCR negative  - concern for aspiration with KeoFeed, requiring freqeunt suction   - administered Levaquin in ED. Will change to merrem and vancomcyin  - obtain blood cultures, sputum culture, strep pneumo  - scheduled duo nebs  - additional pulmonary toilet as needed  - am labs    2. APRIL:  - history CKD. IVF x 12 hours  - avoid additional nephrotoxins    3. AMS- recent stroke  - imaging shows subacute stroke from recent admission  - will consider repeating MRI and need for neurology involvement  - repeat tox screen  - PT/OT/SLP  - nutrition consult  - resume medications; on statin, ASA, and plavix  -on tube feeds-  - need ACP discussions!    4. Hypokalemia:  - replace as needed per protocol  - no EKG changes  - check magnesium    5. DM2:  - uncontrolled. Recent A1c 9.3  - hold oral hypoglycemics  - start SSI as needed. Regular accu checks    6. Hypertension:  - controlled and  "stable  - continue home meds as tolerated    DVT prophylaxis:  [facility]      CODE STATUS:  Full code  Code Status and Medical Interventions:   Ordered at: 01/09/21 3697     Level Of Support Discussed With:    Patient     Code Status:    CPR     Medical Interventions (Level of Support Prior to Arrest):    Full       Electronically signed by Ángela Ba PA-C, 01/09/21, 11:15 PM EST.  Attending   Admission Attestation       I have seen and examined the patient, performing an independent face-to-face diagnostic evaluation with plan of care reviewed and developed with the advanced practice clinician (APC).      Brief Summary Statement:     Angel Grace is a 50 y.o. male with a past medical history significant for HLD, HTN, CAD, PVD, DM2, CKD, GERD, s/p splenectomy, and ongoing tobacco abuse with recent stroke, at [facility] for rehab - they sent him to ED for hypoxia, unresponsiveness.  Patient unable to provide any history  Remainder of detailed HPI is as noted by APC and has been reviewed and/or edited by me for completeness.    Attending Physical Exam:  BP (!) 170/103   Pulse 102   Temp 100.3 °F (37.9 °C) (Axillary)   Resp 16   Ht 188 cm (74\")   Wt 108 kg (237 lb 9.6 oz)   SpO2 97%   BMI 30.51 kg/m²     Patient is alert , aphasic has keofeed in place  Neck is without mass or JVD  Heart is Reg wo murmur  Lungs are clear wo wheeze or crackle  Abdomen is soft without HSM or mass, not tender or distended  Moves left arm and leg  Skin is without rash  Neurologic exam is grossly abnormal  Mood is sleepy but moves around in the bed    Brief Assessment/Plan :  See detailed assessment and plan developed with APC which I have reviewed and/or edited for completeness.    I believe this patient meets INPATIENT status due to acute hypoxia- probable aspiration pneumonia.  I feel patient’s risk for adverse outcomes and need for care warrant INPATIENT evaluation and I predict the patient’s care encounter to likely last beyond 2 " midnights.      Electronically signed by Purvi Cardona MD, 01/10/21, 3:15 AM EST.         Electronically signed by Purvi Cardona MD at 01/10/21 0352

## 2021-01-14 NOTE — CONSULTS
Stroke Consult Note    Patient Name: Angel Grace   MRN: [id number]  Age: 50 y.o.  Sex: male  : 1970    Primary Care Physician: Arlen Galvan MD  Referring Physician:  Dr. Valverde    TIME STROKE TEAM CALLED: 2245 EST     TIME PATIENT SEEN: 2315 EST    Race: -American    Chief Complaint/Reason for Consultation: Decreased responsiveness, prior CVA    Subjective .  HPI: Angel Grace is a 50-year-old, -American male with known diagnosis of CVA, DM 2, HTN, HLD, CAD, PVD, CKD, GERD, s/p splenectomy, drug abuse, and tobacco abuse admitted to [facility] on 2021 with acute respiratory failure with hypoxia 2/2 pneumonia/sepsis and AMS; now with worsening of his baseline LOC accompanied by acute respiratory distress.  History is obtained from prior documentation, patient is aphasic, no family is available.  Patient was treated for a CVA in 2020 from which she had had some residual speech difficulties but was conversive, he had left that facility AMA.  He was admitted to [facility] 2021 after he was found crawling on the ground and nonverbal by family who had not seen him in approximately 1 month.  His UDS was positive for cocaine.  Per documentation his initial assessment at OSH patient was alert with expressive aphasia, only able to nod yes and no, right facial droop, BUE strength 5/5, BLE strength 4/5.  CT obtained on 2021 showed a late subacute left frontal stroke with chronic lacunar strokes in bilateral basal ganglia and right frontal lobe parietal.  No evidence of acute ischemia.  Throughout his hospitalization patient improved and was able to say some words with dysarthria, able to state his name and name some items.  He continued to have right facial droop, was able to follow commands.   Echo was negative for PFO.  An MRA was obtained on 2020 and was essentially unremarkable however did note a filling defect of the left carotid bulb extending into the proximal  ICA that was not present on the study from October 2020 identified is likely artifactual, 0% carotid bulb stenosis is documented as well.      Patient was discharged to [facility] on 1/8/2021 has assessment was documented as right hemiparesis, dysarthria, dysphagia, abnormal gait requiring assistance, and cognitive impairments.  He was discharged to see [facility] on aspirin and Plavix.  No documentation from [facility] is available.  Per ED documentation, while at [facility] patient was able to visually track, and nod his head yes or no to questions; he had a Keofeed in place.  On 1/9/2021 patient had a decline in his mental status, no longer following commands was found to be hypoxic with O2 sat in the mid 80s on room air; patient was transported to [facility] ED. patient noted to be PHELPS symmetrically, though globally weak, he remained nonverbal.  Patient was admitted for acute respiratory failure with hypoxia 2/2 pneumonia likely aspiration, he was negative for Covid.  CT head was obtained for AMS and showed large subacute infarct in the left anterior corona radiata and old right posterior corona radiata infarct.    Throughout this hospitalization patient has remained nonverbal, his respiratory status has been tenuous requiring increasing amounts of oxygen and NT suctioning, he has been in bilateral wrist restraints 2/2 agitation and pulling out his Keofeed.  Per nursing staff at 0600 on 1/13/2021 patient was able to follow some commands: wiggling toes bilaterally,  are equal although he remained aphasic.  Throughout the day he had a decline in his mental status, not following commands.  In the evening of 1/13/2021 patient was lethargic, not following commands, respiration rate documented at 4 breaths/min.  He was started on BiPAP and evaluated by the intensivist given Narcan.  Patient's nurse reported that he became more alert, opening his eyes, spontaneously moving his left side, flaccid on the right.  Imaging was ordered by  "hospitalist and we are asked to see patient in consult.      Upon my exam patient is drowsy, opens eyes to verbal.  Does not follow commands.  Expressive aphasia.  Slight right facial droop.  Gaze palsy to the right, all visual fields appear to be intact to visual threat, PERRLA.  Spontaneous movement on the left with antigravity strength.  Withdraws on the right.  Patient remains on BiPAP.  Imaging is pending    Last Known Normal Date/Time: unknown    Review of Systems   Unable to perform ROS: Patient nonverbal      Past Medical History:   Diagnosis Date   • CAD (coronary artery disease) 1/9/2021   • CKD (chronic kidney disease) 1/9/2021   • DM2 (diabetes mellitus, type 2) (CMS/[facility]) 1/9/2021   • Essential hypertension 1/9/2021   • GERD without esophagitis 1/9/2021   • History of CVA (cerebrovascular accident) 1/9/2021   • HLD (hyperlipidemia) 1/9/2021     Past Surgical History:   Procedure Laterality Date   • SPLENECTOMY       History reviewed. No pertinent family history.  Social History     Socioeconomic History   • Marital status: Single     Spouse name: Not on file   • Number of children: Not on file   • Years of education: Not on file   • Highest education level: Not on file   Tobacco Use   • Smoking status: Current Every Day Smoker     Types: Cigarettes   Substance and Sexual Activity   • Alcohol use: Defer   • Drug use: Yes     Types: \"Crack\" cocaine   • Sexual activity: Defer     Allergies   Allergen Reactions   • Penicillins Unknown - Low Severity     Has tolerated cefepime     Prior to Admission medications    Medication Sig Start Date End Date Taking? Authorizing Provider   aspirin 81 MG chewable tablet Chew 81 mg Daily.   Yes ProviderAkua MD   carvedilol (COREG) 12.5 MG tablet Take 12.5 mg by mouth 2 (Two) Times a Day With Meals.   Yes ProviderAkua MD   clopidogrel (PLAVIX) 75 MG tablet Take 75 mg by mouth Daily.   Yes ProviderAkua MD   gabapentin (NEURONTIN) 400 MG capsule " Take 400 mg by mouth 3 (Three) Times a Day.   Yes Akua Marcial MD   glipizide (GLUCOTROL) 10 MG tablet Take 10 mg by mouth 2 (Two) Times a Day Before Meals.   Yes Akua Marcial MD   losartan (COZAAR) 50 MG tablet Take 50 mg by mouth Daily.   Yes Akua Marcial MD   omeprazole (priLOSEC) 20 MG capsule Take 20 mg by mouth Daily.   Yes Akua Marcial MD   oxyCODONE-acetaminophen (PERCOCET) 5-325 MG per tablet Take 1 tablet by mouth Every 6 (Six) Hours As Needed for Moderate Pain  (4-6).   Yes Akua Marcial MD   pravastatin (PRAVACHOL) 40 MG tablet Take 40 mg by mouth Daily.   Yes Akua Marcial MD             Objective     Temp:  [99.1 °F (37.3 °C)-99.6 °F (37.6 °C)] 99.5 °F (37.5 °C)  Heart Rate:  [] 85  Resp:  [3-21] 14  BP: (105-168)/() 168/87  Neurological Exam  Mental Status  Arousable to verbal stimuli. Patient is nonverbal. Mixed aphasia present.    Cranial Nerves  CN II: Visual fields full to confrontation. To visual threat.  CN III, IV, VI: Abnormal extraocular movements: Left gaze preference, does overcome midline. Normal lids and orbits bilaterally. Pupils equal round and reactive to light bilaterally.  CN VII:  Right: There is central facial weakness.  Left: There is no facial weakness.    Motor  Normal muscle bulk throughout. Normal muscle tone. Right hemiparesis.    Sensory  Withdraws to painful stimuli on the right.    Coordination  Not tested 2/2 mental status.    Gait  Not tested.      Physical Exam  Vitals signs reviewed.   Constitutional:       Appearance: He is obese. He is ill-appearing.   HENT:      Head: Normocephalic and atraumatic.   Eyes:      General: Lids are normal.      Extraocular Movements: EOM normal     Pupils: Pupils are equal, round, and reactive to light.   Neck:      Musculoskeletal: Normal range of motion. No neck rigidity.   Cardiovascular:      Rate and Rhythm: Normal rate and regular rhythm.   Pulmonary:      Effort:  Pulmonary effort is normal.      Comments: BiPAP  Abdominal:      General: There is no distension.      Palpations: Abdomen is soft.   Musculoskeletal:         General: Swelling present.   Skin:     General: Skin is warm and dry.   Neurological:      Cranial Nerves: Cranial nerve deficit present.      Sensory: Sensory deficit present.      Motor: Weakness present.   Psychiatric:      Comments: Drowsy         Acute Stroke Data    Alteplase (tPA) Inclusion / Exclusion Criteria    Time: 23:45 EST  Person Administering Scale: KEE Wu    Inclusion Criteria  [x]   18 years of age or greater   []   Onset of symptoms < 4.5 hours before beginning treatment (stroke onset = time patient was last seen well or without symptoms).   []   Diagnosis of acute ischemic stroke causing measurable disabling deficit (Complete Hemianopia, Any Aphasia, Visual or Sensory Extinction, Any weakness limiting sustained effort against gravity)   []   Any remaining deficit considered potentially disabling in view of patient and practitioner   Exclusion criteria (Do not proceed with Alteplase if any are checked under exclusion criteria)  [x]   Onset unknown or GREATER than 4.5 hours   []   ICH on CT/MRI   []   CT demonstrates hypodensity representing acute or subacute infarct   []   Significant head trauma or prior stroke in the previous 3 months   []   Symptoms suggestive of subarachnoid hemorrhage   []   History of un-ruptured intracranial aneurysm GREATER than 10 mm   []   Recent intracranial or intraspinal surgery within the last 3 months   []   Arterial puncture at a non-compressible site in the previous 7 days   []   Active internal bleeding   []   Acute bleeding tendency   []   Platelet count LESS than 100,000 for known hematological diseases such as leukemia, thrombocytopenia or chronic cirrhosis   []   Current use of anticoagulant with INR GREATER than 1.7 or PT GREATER than 15 seconds, aPTT GREATER than 40 seconds   []    Heparin received within 48 hours, resulting in abnormally elevated aPTT GREATER than upper limit of normal   []   Current use of direct thrombin inhibitors or direct factor Xa inhibitors in the past 48 hours   []   Elevated blood pressure refractory to treatment (systolic GREATER than 185 mm/Hg or diastolic  GREATER than 110 mm/Hg   []   Suspected infective endocarditis and aortic arch dissection   []   Current use of therapeutic treatment dose of low-molecular-weight heparin (LMWH) within the previous 24 hours   []   Structural GI malignancy or bleed   Relative exclusion for all patients  []   Only minor non-disabling symptoms   []   Pregnancy   []   Seizure at onset with postictal residual neurological impairments   []   Major surgery or previous trauma within past 14 days   []   History of previous spontaneous ICH, intracranial neoplasm, or AV malformation   []   Postpartum (within previous 14 days)   []   Recent GI or urinary tract hemorrhage (within previous 21 days)   []   Recent acute MI (within previous 3 months)   []   History of un-ruptured intracranial aneurysm LESS than 10 mm   []   History of ruptured intracranial aneurysm   []   Blood glucose LESS than 50 mg/dL (2.7 mmol/L)   []   Dural puncture within the last 7 days   []   Known GREATER than 10 cerebral microbleeds   Additional exclusions for patients with symptoms onset between 3 and 4.5 hours.  []   Age > 80.   []   On any anticoagulants regardless of INR  >>> Warfarin (Coumadin), Heparin, Enoxaparin (Lovenox), fondaparinux (Arixtra), bivalirudin (Angiomax), Argatroban, dabigatran (Pradaxa), rivaroxaban (Xarelto), or apixaban (Eliquis)   []   Severe stroke (NIHSS > 25).   [x]   History of BOTH diabetes and previous ischemic stroke.   []   The risks and benefits have been discussed with the patient or family related to the administration of IV Alteplase for stroke symptoms.   []   I have discussed and reviewed the patient's case and imaging with the  attending prior to IV Alteplase.    Time Alteplase administered       Hospital Meds:  Scheduled- acetaZOLAMIDE, 500 mg, Intravenous, Once  aspirin, 81 mg, Nasogastric, Daily  atorvastatin, 80 mg, Nasogastric, Nightly  carvedilol, 12.5 mg, Nasogastric, BID With Meals  cefepime, 2 g, Intravenous, Q8H  clopidogrel, 75 mg, Nasogastric, Daily  sennosides, 10 mL, Oral, BID    And  docusate sodium, 100 mg, Oral, BID  escitalopram, 10 mg, Nasogastric, Nightly  guaiFENesin, 200 mg, Oral, Q4H  heparin (porcine), 5,000 Units, Subcutaneous, Q8H  insulin regular, 0-7 Units, Subcutaneous, Q6H  insulin regular, 6 Units, Subcutaneous, Q6H  ipratropium-albuterol, 3 mL, Nebulization, 4x Daily - RT  lansoprazole, 15 mg, Per G Tube, Q AM  losartan, 50 mg, Oral, Q24H  metroNIDAZOLE, 500 mg, Oral, Q8H  sodium chloride, 10 mL, Intravenous, Q12H      Infusions-     PRNs- •  acetaminophen  •  acetaminophen  •  dextrose  •  dextrose  •  Fleets  •  glucagon (human recombinant)  •  glycopyrrolate  •  ipratropium-albuterol  •  labetalol  •  LORazepam  •  melatonin  •  Morphine  •  naloxone  •  ondansetron  •  oxyCODONE-acetaminophen  •  polyethylene glycol  •  [COMPLETED] Insert peripheral IV **AND** sodium chloride  •  sodium chloride    Functional Status Prior to Current Stroke/[place] Score: MRS 4    NIH Stroke Scale  Time: 23:45 EST  Person Administering Scale: KEE Wu    1a  Level of consciousness: 1=not alert but arousable by minor stimulation to obey, answer or respond   1b. LOC questions:  2=Performs neither task correctly   1c. LOC commands: 2=Performs neither task correctly   2.  Best Gaze: 1=partial gaze palsy   3.  Visual: 0=No visual loss   4. Facial Palsy: 2=Partial paralysis (total or near total paralysis of the lower face)   5a.  Motor left arm: 1=Drift, limb holds 90 (or 45) degrees but drifts down before full 10 seconds: does not hit bed   5b.  Motor right arm: 3=No effort against gravity, limb falls   6a. motor  left le=Some effort against gravity, limb cannot get to or maintain (if cured) 90 (or 45) degrees, drifts down to bed, but has some effort against gravity   6b  Motor right leg:  3=No effort against gravity, limb falls   7. Limb Ataxia: 0=Absent   8.  Sensory: 0=No sensory loss   9. Best Language:  3=Mute, global aphasia; no usable speech or auditory comprehension   10. Dysarthria: 2=Severe; patient speech is so slurred as to be unintelligible in the absence of or our of proportion to any dysphagia, or is mute/anarthric   11. Extinction and Inattention: 2=Profound diana-inattention or diana-inattention to more than one modality. Does not recognize own hand or orients only to one side of space    Total:   24       Results Reviewed:  I have personally reviewed current lab, radiology, and data and agree with results.  WBC   Date Value Ref Range Status   2021 17.54 (H) 3.40 - 10.80 10*3/mm3 Final     RBC   Date Value Ref Range Status   2021 4.24 4.14 - 5.80 10*6/mm3 Final     Hemoglobin   Date Value Ref Range Status   2021 13.1 13.0 - 17.7 g/dL Final     Hematocrit   Date Value Ref Range Status   2021 38.5 37.5 - 51.0 % Final     MCV   Date Value Ref Range Status   2021 90.8 79.0 - 97.0 fL Final     MCH   Date Value Ref Range Status   2021 30.9 26.6 - 33.0 pg Final     MCHC   Date Value Ref Range Status   2021 34.0 31.5 - 35.7 g/dL Final     RDW   Date Value Ref Range Status   2021 11.2 (L) 12.3 - 15.4 % Final     RDW-SD   Date Value Ref Range Status   2021 37.4 37.0 - 54.0 fl Final     MPV   Date Value Ref Range Status   2021 10.6 6.0 - 12.0 fL Final     Platelets   Date Value Ref Range Status   2021 603 (H) 140 - 450 10*3/mm3 Final     Neutrophil %   Date Value Ref Range Status   2021 63.8 42.7 - 76.0 % Final     Lymphocyte %   Date Value Ref Range Status   2021 18.4 (L) 19.6 - 45.3 % Final     Monocyte %   Date Value Ref Range Status    01/13/2021 13.9 (H) 5.0 - 12.0 % Final     Eosinophil %   Date Value Ref Range Status   01/13/2021 3.0 0.3 - 6.2 % Final     Basophil %   Date Value Ref Range Status   01/13/2021 0.5 0.0 - 1.5 % Final     Immature Grans %   Date Value Ref Range Status   01/13/2021 0.4 0.0 - 0.5 % Final     Neutrophils, Absolute   Date Value Ref Range Status   01/13/2021 11.18 (H) 1.70 - 7.00 10*3/mm3 Final     Lymphocytes, Absolute   Date Value Ref Range Status   01/13/2021 3.23 (H) 0.70 - 3.10 10*3/mm3 Final     Monocytes, Absolute   Date Value Ref Range Status   01/13/2021 2.44 (H) 0.10 - 0.90 10*3/mm3 Final     Eosinophils, Absolute   Date Value Ref Range Status   01/13/2021 0.53 (H) 0.00 - 0.40 10*3/mm3 Final     Basophils, Absolute   Date Value Ref Range Status   01/13/2021 0.09 0.00 - 0.20 10*3/mm3 Final     Immature Grans, Absolute   Date Value Ref Range Status   01/13/2021 0.07 (H) 0.00 - 0.05 10*3/mm3 Final     nRBC   Date Value Ref Range Status   01/13/2021 0.0 0.0 - 0.2 /100 WBC Final     Lab Results   Component Value Date    GLUCOSE 209 (H) 01/13/2021    BUN 12 01/13/2021    CREATININE 1.04 01/13/2021    EGFRIFAFRI 93 01/12/2021    BCR 7.8 01/12/2021    K 3.9 01/13/2021    CO2 29.0 01/13/2021    CALCIUM 8.6 01/13/2021    ALBUMIN 2.80 (L) 01/13/2021    LABIL2 1.1 03/30/2018    AST 12 01/13/2021    ALT 8 01/13/2021     Ct Head Without Contrast    Result Date: 1/14/2021  1. Continued evolution of the large subacute left MCA distribution infarct. 2. Now seen is an 8 mm subacute infarct in the brainstem. 3. Chronic changes in the brain as above. 4. No acute hemorrhage. Signer Name: Eddie Herron MD  Signed: 1/14/2021 12:38 AM  Workstation Name: [facility]    Ct Angiogram Neck    Result Date: 1/14/2021  1. Normal CT angiogram of the neck. 2. Multifocal areas of moderate to high-grade stenosis intracranially as above with scattered areas of mild stenoses noted fairly diffusely. Findings  may all be secondary to diffuse atherosclerotic disease, but vasculitis is not excluded. No focal large vessel occlusion is seen. No aneurysm identified. Signer Name: Eddie Herron MD  Signed: 1/14/2021 1:09 AM  Workstation Name: [facility]    Ct Cervical Spine With Contrast    Result Date: 1/14/2021  1. No fracture or subluxation. 2. No pathologic enhancement. No definite evidence of osteomyelitis or discitis. 3. Multilevel degenerative disease as above resulting in foraminal stenosis and central canal stenosis where indicated. There does appear to be severe central stenosis at C2-3 and possibly at C6-7. Consider MRI for follow-up if patient is a candidate. Signer Name: Eddie Herron MD  Signed: 1/14/2021 1:47 AM  Workstation Name: [facility]    Xr Chest 1 View    Result Date: 1/12/2021  No acute cardiopulmonary findings. Signer Name: Eddie Herron MD  Signed: 1/12/2021 10:32 PM  Workstation Name: [facility]    Ct Angiogram Head    Result Date: 1/14/2021  1. Normal CT angiogram of the neck. 2. Multifocal areas of moderate to high-grade stenosis intracranially as above with scattered areas of mild stenoses noted fairly diffusely. Findings may all be secondary to diffuse atherosclerotic disease, but vasculitis is not excluded. No focal large vessel occlusion is seen. No aneurysm identified. Signer Name: Eddie Herron MD  Signed: 1/14/2021 1:09 AM  Workstation Name: [facility]    Ct Cerebral Perfusion With & Without Contrast    Result Date: 1/14/2021  Abnormal examination showing an 18 mL infarct core in the left MCA distribution with a surrounding 3 mL area of ischemic tissue. Signer Name: Eddie Herron MD  Signed: 1/14/2021 12:53 AM  Workstation Name: [facility]           Assessment/Plan:  50-year-old, -American male  with known diagnosis of CVA, DM 2, HTN, HLD, CAD, PVD, CKD, GERD, s/p splenectomy, drug abuse, and tobacco abuse admitted to [facility] on 1/9/2021 with acute respiratory failure with hypoxia 2/2 pneumonia/sepsis and AMS now with worsening of his baseline LOC accompanied by acute respiratory distress.  Patient had some improvement in LOC with BiPAP and Narcan, he remains aphasic with right-sided hemiparesis.  NIHSS 24    Diagnostics/imaging review -imaging delayed 2/2 unstable respiratory status  CT head shows evolving large subacute left MCA infarct, new subacute infarct in the lower abelardo/upper medulla measuring 8 mm in diameter.  Chronic bilateral basal ganglia and thalamic lacunar infarcts.  CT perfusion shows completed left MCA stroke not amenable to neuro intervention, imaging was reviewed by Dr. Bocanegra  CTA H/N high-grade stenosis in the proximal left A2.  Moderate to high-grade stenosis in the right M1 with multifocal areas of scattered atherosclerotic narrowing or possibly vasculitis in the bilateral MCA and PCAs.  No focal vessel cutoff.      1. CVA -multifocal ischemic infarcts of varying chronicity including large completed left MCA and subacute brainstem CVAs-unfortunately not a candidate for TPA nor neuro intervention suspect cardioembolic source   -TIA/ischemic stroke order without thrombolytics set has been initiated   -MRI brain without pending   -Low threshold for ICU transfer   -Hold sedating medications   -Continue aspirin and Plavix   -A1c and lipid panel   -Bedrest   -Stroke neurology will follow    2.  Acute respiratory failure with hypoxia/pneumonia.     -Continue BiPAP, ABG improved   -Antibiotics per hospitalist    3.  HTN   -Permissive hypertension, hold antihypertensives   -Echo, may need KAMILLA today as well   -N.p.o./please hold tube feeds    4.  HLD   -Lipid panel   -Continue atorvastatin 80 mg    5.   DMII   -A1c   -SSI per hospitalist          Larisa Sanford, APRN  January 14, 2021  05:24  EST

## 2021-01-14 NOTE — PROGRESS NOTES
"Palliative Care Progress Note    Date of Admission: 1/9/2021    Subjective: Patient remains unresponsive.  Current Code Status     Date Active Code Status Order ID Comments User Context       1/9/2021 2308 CPR [id number]  Ángela Ba PA-C ED     Advance Care Planning Activity      Questions for Current Code Status     Question Answer Comment    Code Status CPR     Medical Interventions (Level of Support Prior to Arrest) Full     Level Of Support Discussed With Patient         No current facility-administered medications on file prior to encounter.      Current Outpatient Medications on File Prior to Encounter   Medication Sig Dispense Refill   • aspirin 81 MG chewable tablet Chew 81 mg Daily.     • carvedilol (COREG) 12.5 MG tablet Take 12.5 mg by mouth 2 (Two) Times a Day With Meals.     • clopidogrel (PLAVIX) 75 MG tablet Take 75 mg by mouth Daily.     • gabapentin (NEURONTIN) 400 MG capsule Take 400 mg by mouth 3 (Three) Times a Day.     • glipizide (GLUCOTROL) 10 MG tablet Take 10 mg by mouth 2 (Two) Times a Day Before Meals.     • losartan (COZAAR) 50 MG tablet Take 50 mg by mouth Daily.     • omeprazole (priLOSEC) 20 MG capsule Take 20 mg by mouth Daily.     • oxyCODONE-acetaminophen (PERCOCET) 5-325 MG per tablet Take 1 tablet by mouth Every 6 (Six) Hours As Needed for Moderate Pain  (4-6).     • pravastatin (PRAVACHOL) 40 MG tablet Take 40 mg by mouth Daily.          •  acetaminophen  •  acetaminophen  •  dextrose  •  dextrose  •  Fleets  •  glucagon (human recombinant)  •  glycopyrrolate  •  ipratropium-albuterol  •  labetalol  •  LORazepam  •  melatonin  •  Morphine  •  naloxone  •  ondansetron  •  oxyCODONE-acetaminophen  •  polyethylene glycol  •  [COMPLETED] Insert peripheral IV **AND** sodium chloride  •  sodium chloride    Objective: /93 (BP Location: Right arm, Patient Position: Lying)   Pulse 92   Temp 100 °F (37.8 °C) (Axillary)   Resp 18   Ht 188 cm (74.02\")   Wt 108 kg (238 lb)  "  SpO2 98%   BMI 30.54 kg/m²      Intake/Output Summary (Last 24 hours) at 1/14/2021 1147  Last data filed at 1/13/2021 1800  Gross per 24 hour   Intake 1062 ml   Output --   Net 1062 ml     Physical Exam:      General Appearance:    Opens eyes, doesn't communicate, restless appearing   Head:    Normocephalic, without obvious abnormality, atraumatic   Eyes:            Lids and lashes normal, conjunctivae and sclerae normal, no   icterus, no pallor, corneas clear, PERRLA   Ears:    Ears appear intact with no abnormalities noted   Throat:   No oral lesions, no thrush, oral mucosa moist   Neck:   No adenopathy, supple, trachea midline, no thyromegaly, no   carotid bruit, no JVD   Back:     No kyphosis present, no scoliosis present, no skin lesions,      erythema or scars, no tenderness to percussion or                   palpation,   range of motion normal   Lungs:     Clear to auscultation,respirations regular, even and                  unlabored    Heart:    Regular rhythm and normal rate, normal S1 and S2, no            murmur, no gallop, no rub, no click   Chest Wall:    No abnormalities observed   Abdomen:     Normal bowel sounds, no masses, no organomegaly, soft        non-tender, non-distended, no guarding, no rebound                tenderness   Rectal:     Deferred   Extremities:   No edema   Pulses:   Pulses palpable and equal bilaterally   Skin:   No bleeding, bruising or rash   Lymph nodes:   No palpable adenopathy         Results from last 7 days   Lab Units 01/13/21  0830   WBC 10*3/mm3 17.54*   HEMOGLOBIN g/dL 13.1   HEMATOCRIT % 38.5   PLATELETS 10*3/mm3 603*     Results from last 7 days   Lab Units 01/13/21  0830   SODIUM mmol/L 140   POTASSIUM mmol/L 3.9   CHLORIDE mmol/L 103   CO2 mmol/L 29.0   BUN mg/dL 12   CREATININE mg/dL 1.04   CALCIUM mg/dL 8.6   BILIRUBIN mg/dL 0.6   ALK PHOS U/L 114   ALT (SGPT) U/L 8   AST (SGOT) U/L 12   GLUCOSE mg/dL 209*       Impression: H/O CVA  PNA  Change in  MS  Dyspnea  [facility]  Plan: Tried calling the patient's sister but no answer.  We will continue to try to reach her to discuss goals of care including talking about CODE STATUS.        Alberto Mccormack DO  01/14/21  11:47 EST

## 2021-01-14 NOTE — H&P
Arlen Galvan MD - PCP      HPI:   49yo M w/ PMH of HLD, HTN, CAD, PVD, T2DM, CKD, GERD, s/p splenectomy, and ongoing tobacco abuse.  Prior stroke in October 2020; remained independent, conversant.  Hospitalized in [place] 1/3/21 after presenting with aphasia and right facial droop.  Found to have new stroke; UDS was cocaine positive at that time.  Discharged to [facility] rehab with persistent dysphagia; keofeed in place for nutrition.  Rehospitalized 1/9/21 with hypoxia and unresponsiveness. Despite maximized medical treatment for his hypoxic respiratory failure felt due to aspiration PNA w/ sepsis (including oxygen, broad abx, nebs, and suctioning), his respiratory status and mentation continued to worsen.  Required restraints to maintain appropriate medical therapy.  Repeat head CT 1/14 demonstrated subacute brainstem infarction. Given dismal prognosis, patient's sister elected to transition patient to full comfort focused care. Reports patient would never want to live in a debilitated state with a feeding tube.    Patient nonverbal, not following commands.  Remains restrained; keofeed in place with TF running at 25ml/hr; on 4L NC.    Meds reviewed.    Got narcan and flumazenil last night.  Has had Neurontin 400mg TID.  Tylenol given x1 this am.  PRN robinul x2 yesterday, x1 today.  No PRN Morphine given this far.  PRN percocet 5mg given x2 yesterday.  (Home med list includes the 400mg neurontin and 5mg percocet.)    Past Medical History:   Diagnosis Date   • CAD (coronary artery disease) 1/9/2021   • CKD (chronic kidney disease) 1/9/2021   • DM2 (diabetes mellitus, type 2) (CMS/[facility]) 1/9/2021   • Essential hypertension 1/9/2021   • GERD without esophagitis 1/9/2021   • History of CVA (cerebrovascular accident) 1/9/2021   • HLD (hyperlipidemia) 1/9/2021     Past Surgical History:   Procedure Laterality Date   • SPLENECTOMY       Current Code Status     Date Active Code Status Order ID Comments User Context  "      2021 1454 No CPR [id number]  Maddison Shah MD Inpatient     Advance Care Planning Activity      Questions for Current Code Status     Question Answer Comment    Code Status No CPR     Medical Interventions (Level of Support Prior to Arrest) Comfort Measures         Scheduled Meds:LORazepam, 0.5 mg, Intravenous, Q6H  Morphine, 2 mg, Intravenous, Q6H  palliative care oral rinse, , Mouth/Throat, TID    PRN Meds:.•  acetaminophen  •  bisacodyl  •  glycopyrrolate  •  haloperidol lactate  •  ketorolac  •  LORazepam **OR** LORazepam  •  Morphine  •  ondansetron  •  palliative care oral rinse  •  PHENobarbital  •  polyvinyl alcohol  •  sodium chloride      Allergies   Allergen Reactions   • Penicillins Unknown - Low Severity     Has tolerated cefepime     No family history on file.  Social History     Socioeconomic History   • Marital status: Single     Spouse name: Not on file   • Number of children: Not on file   • Years of education: Not on file   • Highest education level: Not on file   Tobacco Use   • Smoking status: Current Every Day Smoker     Types: Cigarettes   Substance and Sexual Activity   • Alcohol use: Defer   • Drug use: Yes     Types: \"Crack\" cocaine   • Sexual activity: Defer   Not .  No children.  1 sister Mile, lives in [place].  1 brother, 10 yrs younger.  Mother ;  just in April.  (Mother and sister were in a severe car accident 8 months prior to mother's death.  Both hospitalized for 3 months.)       Review of Systems - unable to obtain from patient; see PMH and HPI    Pulse 99   Temp 99.8 °F (37.7 °C) (Axillary)   SpO2 98%     Intake/Output Summary (Last 24 hours) at 2021 1814  Last data filed at 2021 1000  Gross per 24 hour   Intake 100 ml   Output --   Net 100 ml     Physical Exam:  Gen - young, ill, black male, in bed, NAD  Head/Neck - NC/AT, trachea midline  Eyes - large, no icterus, left gaze preference  ENT - 4L NC and keofeed in nares, mouth " dry  Heart - regular, no murmur  Lungs - diminished, congested cough present, respirations unlabored   Abd - soft, nontender  Ext - soft wrist and ankle restraints in place; no edema  Skin - warm, dry  Neuro -  awake and alert, moves in response to noise and touch but gaze remains left; no reaction to questioning; moves left hand and foot; no myoclonus  Psych - no grimacing or verbalization       Results from last 7 days   Lab Units 01/13/21  0830   WBC 10*3/mm3 17.54*   HEMOGLOBIN g/dL 13.1   HEMATOCRIT % 38.5   PLATELETS 10*3/mm3 603*     Results from last 7 days   Lab Units 01/13/21  0830   SODIUM mmol/L 140   POTASSIUM mmol/L 3.9   CHLORIDE mmol/L 103   CO2 mmol/L 29.0   BUN mg/dL 12   CREATININE mg/dL 1.04   CALCIUM mg/dL 8.6   BILIRUBIN mg/dL 0.6   ALK PHOS U/L 114   ALT (SGPT) U/L 8   AST (SGOT) U/L 12   GLUCOSE mg/dL 209*     Results from last 7 days   Lab Units 01/13/21  0830   SODIUM mmol/L 140   POTASSIUM mmol/L 3.9   CHLORIDE mmol/L 103   CO2 mmol/L 29.0   BUN mg/dL 12   CREATININE mg/dL 1.04   GLUCOSE mg/dL 209*   CALCIUM mg/dL 8.6     Imaging Results (Last 72 Hours)     ** No results found for the last 72 hours. **        Active Hospital Problems     Diagnosis   POA   • **Acute respiratory failure with hypoxia (CMS/[facility]) [J96.01]   Yes   • Sepsis due to pneumonia (CMS/[facility]) [J18.9, A41.9]   Yes   • Hypokalemia [E87.6]   Yes   • APRIL (acute kidney injury) (CMS/[facility]) [N17.9]   Yes   • History of CVA (cerebrovascular accident) [Z86.73]   Not Applicable   • GERD without esophagitis [K21.9]   Yes   • DM2 (diabetes mellitus, type 2) (CMS/[facility]) [E11.9]   Yes   • CAD (coronary artery disease) [I25.10]   Yes   • CKD (chronic kidney disease) [N18.9]   Yes   • Tobacco abuse [Z72.0]   Yes   • AMS (altered mental status) [R41.82]   Yes   • Essential hypertension [I10]   Yes   •        Impression:   50yoM w/ recurrent CVA, now brainstem involvement; tobacco and cocaine hx; comorbid respiratory and sepsis from aspiration  PNA.    Symptoms:  Agitation, Restlessness  Encephalopathy, Lethargy  Congestion, Secretions  Debility, Dysphagia, Aphasia  MSK pain    Plan:   -Admitted to inpt Hospice today for comfort focused end of life care.  In need of frequent skilled nursing assessments and interventions for comfort.  Injection medication in use and will need titration.  -Will go ahead and schedule low dose morphine and ativan ATC with plan to remove restraints once patient consistently calm and comfortable.  -Eye drops and palliative mouth rinse for mucous membrane care.  -Will anchor osorio catheter when felt patient less risk to pull it.    -D/C keofeed and tube feeding.  Comfort diet as desired/tolerated.   -Generous PRN meds ordered for distressing symptoms noted above, as well as dyspnea, fever, nausea, and constipation.  -Support and ed available from the multidisciplinary Hospice team.  Hospice RN and SW spoke with sister by phone today.  -Prognosis days to weeks.        Maddison Shah MD  01/14/21

## 2021-01-14 NOTE — PROGRESS NOTES
"                  Clinical Nutrition     Nutrition Support Assessment  Reason for Visit:   Follow-up protocol, EN    Patient Name: Angel Grace  YOB: 1970  MRN: [id number]  Date of Encounter: 01/14/21 09:48 EST  Admission date: 1/9/2021    Nutrition Assessment   Assessment     Admission diagnosis  Altered mental status    Additional applicable diagnosis/conditions/procedures this adm:  Recent stroke    Nonverbal  Acute respiratory failure with hypoxia  Pneumonia, ?aspiration  Sepsis  Severely aphasic  APRIL, resolved  Hypokalemia    (1/9) keofeed in place from facility  (1/10) keofeed pulled out  (1/11) SLP eval, rec - NPO, temporary alternate methods of nutrition/hydration  (1/12) SLP eval, rec - NPO, temporary alternate methods of nutrition/hydration    Applicable PMH/PSxH:   HLP  HTN  CAD  PVD  DM2  GERD  CKD  History of cocaine use    Splenectomy    Reported/Observed/Food/Nutrition Related History:      Noted patient with RRT called over night. EN held at midnight. Noted per MD documentation, patient with subacute brainstem CVA.     Per I/O documentation:  Last BM x2 (1/12)    Anthropometrics     Height: 188 cm (74\")  Last filed wt: Weight: 108 kg (238 lb 1.6 oz) (01/11/21 0300)  Weight Method: Bed scale    BMI: BMI (Calculated): 30.6  Obese Class I: 30-34.9kg/m2    Ideal Body Weight (IBW) (kg): 87.66  Admission wt: 240 lb 1.3 oz  Method obtained: stated weight per charting 1/9     Weight Change   UBW: unsure  Weight change:   % wt change:   Time frame of weight loss:     Labs reviewed     Results from last 7 days   Lab Units 01/13/21  0830 01/12/21  0606 01/11/21  0635  01/09/21  1825   GLUCOSE mg/dL 209* 252* 168*   < > 83   BUN mg/dL 12 8 7   < > 7   CREATININE mg/dL 1.04 1.02 0.96   < > 1.28*   SODIUM mmol/L 140 139 135*   < > 138   CHLORIDE mmol/L 103 100 98   < > 99   POTASSIUM mmol/L 3.9 3.7 3.9   < > 3.4*   PHOSPHORUS mg/dL 2.5 3.3  --   --   --    MAGNESIUM mg/dL 1.9  --   --   --   --  "   ALT (SGPT) U/L 8  --   --   --  11    < > = values in this interval not displayed.     Results from last 7 days   Lab Units 01/13/21  0830 01/09/21  1825   ALBUMIN g/dL 2.80* 3.60   CHOLESTEROL mg/dL 124  --    TRIGLYCERIDES mg/dL 87  --        Results from last 7 days   Lab Units 01/14/21  0554 01/14/21  0109 01/13/21  2153 01/13/21  1726 01/13/21  1145 01/13/21  0528   GLUCOSE mg/dL 163* 99 121 187* 220* 258*     No results found for: HGBA1C      Medications reviewed   Pertinent: colace, gabapentin, merrem IV, protonix, senokot      Intake/Ouptut 24 hrs (7:00AM - 6:59 AM)     Intake & Output (last day)       01/13 0701 - 01/14 0700 01/14 0701 - 01/15 0700    Other 425     NG/     IV Piggyback 100     Total Intake(mL/kg) 1162 (10.8)     Net +1162           Urine Unmeasured Occurrence 1 x           Needs Assessment (1/12)     Height used  188 cm (74 in)   Weight used  107.7 kg (237 lbs) - bed scale   IBW  86.4 kg (190 lbs)   Obesity adjBW  91.7 kg (202 lbs)     Estimated need Method/Equation used Result    Energy/Calorie need   ~2300 kcals/d  20 - 22 actBW   25 kcal/kg obesity adjBW  2154 - 2369 kcal   2293 kcal             Protein   ~120 g/day  1.2 - 1.5 g/kg IBW   1 - 1.2 g/kg actBW  104 - 162 g   108 - 129 g        Fiber     Fluid  ~2300 mL/day  25 - 30 mL/kg obesity adjBW   1 mL/kcal  2293 mL   2300 mL            Current Nutrition Prescription     PO: NPO Diet  Orders Placed This Encounter      Diet, Tube Feeding Tube Feeding Formula: Diabetisource AC (Glucose Control)      EN: Diet, Tube Feeding Tube Feeding Formula: Diabetisource AC (Glucose Control)      EN to provide at goal volume  Intake/Evaluation of Received Nutrient/Fluid Intake last day 2218-6724 - (1/14):     At Target Goal Volume    % Est needs Received per I/O's    % Est needs   Volume  1980 ml  637 mL     Modulars         Energy/kcals 2376 kcals 103% 764 kcals 32%   Protein  119 g pro 99% 38 g pro 32%                 Fiber 30 g  9.7 g     Fluid   W/Free water 1620 ml  2280 ml  521 mL  946 mL             Insufficient EN received, noted patient with RRT 1/13. EN held at midnight    Nutrition Diagnosis     1/10 updated 1/12, 1/14  Problem Inadequate oral intake   Etiology Clinical status   Signs/Symptoms NPO    Status: EN intitiated    Nutrition Intervention   1.  Follow treatment progress, Care plan reviewed  2. Recommend continue current order Diabetisource to goal rate of 90 mL/hr. TGV = 1980 mL/hr. FW at 30 mL/hr.    Route: NDT     At Target Goal Volume     % Est needs   Volume  1980 ml     Energy/kcals 2376 kcals 103%   Protein 119 g pro 99%   Fiber 30 g         Fluid via EN 1620 mL    Total Fluid  2280 mL    Meets 100% RDI Yes        3. Weekly Nutrition panel, CRP and Prealbumin ordered to start Monday 1/18    4. Monitor labs and tolerance of EN initiation/advancement and adjust regimen as medically appropriate    Goal:   General: Nutrition support treatment  PO: Monitor SLP evals  EN/PN: Initiate EN, Deliver estimated needs      Monitoring/Evaluation:   Per protocol, I&O, Pertinent labs, EN delivery/tolerance, Weight, Symptoms, POC/GOC, Swallow function    Will Continue to follow per protocol    Sharon Judd RDN, LD  Time Spent: 35 minutes

## 2021-01-14 NOTE — PROGRESS NOTES
Continued Stay Note  [facility]     Patient Name: Angel Grace  MRN: [id number]  Today's Date: 1/14/2021    Admit Date: 1/14/2021      Patient Admitted to in patient hospice today. POC formulated and new orders received.    Merly Hinojosa RN

## 2021-01-14 NOTE — PLAN OF CARE
Goal Outcome Evaluation:  Plan of Care Reviewed With: patient  Progress: no change   At approximately 20:15 patient began to desat on monitor, RN in room, this RN and another RN counted respirations at 3/min and uneven and shallow. APRN notified, APRN came to bedside. STAT ABG ordered, narcan ordered and given per orders. Patient is more alert after narcan but RR remain 5-6/min. MD called to bedside, narcan administered again, flumazenil administered per orders. Patient more alert and moving left side of body. Patient placed on BiPAP. Stroke navigator saw patient at bedside, patient taken for CT head. NIH and neuro checks ordered per stroke team. Initial NIH 30 due to patient not following commands and not being able to fully participate. At time of this note, patient resting in bed, RR now 14/min. Oral care provided, turned Q2, hell boots in place.

## 2021-01-14 NOTE — SIGNIFICANT NOTE
Pt with subacute brainstem stroke noted.  Discussed with stroke maxine, will hold off on MRI tonight as Im not exactly sure how this would  and would only put him at further risk of decompensation as would have to be done off bipap.  High risk for AC admin due to risk of hemorrhagic conversion.  Cont aspirin and plavix.  Palliative care to continue GOC discussions.

## 2021-01-14 NOTE — CONSULTS
Attempted to see Mr. Garce for diabetes education.  Remains in restraints and confused.  Will follow for education.  Thank you.

## 2021-01-14 NOTE — DISCHARGE SUMMARY
[facility] Medicine Services  DISCHARGE TO INPATIENT HOSPICE    Patient Name: Angel Grace  : 1970  MRN: [id number]    Date of Admission: 2021  Date of Discharge:  2021  Primary Care Physician: Arlen Galvan MD    Consults     Date and Time Order Name Status Description    2021 0129 Inpatient Neurology Consult Stroke Completed     2021 2143 Inpatient Neurology Consult Stroke Completed     2021 1245 Inpatient Palliative Care MD Consult Completed         Hospital Course     Presenting Problem:   Pneumonia [J18.9]  Pneumonia [J18.9]    Active Hospital Problems    Diagnosis  POA   • **Acute respiratory failure with hypoxia (CMS/[facility]) [J96.01]  Yes   • Sepsis due to pneumonia (CMS/[facility]) [J18.9, A41.9]  Yes   • Hypokalemia [E87.6]  Yes   • APRIL (acute kidney injury) (CMS/[facility]) [N17.9]  Yes   • History of CVA (cerebrovascular accident) [Z86.73]  Not Applicable   • GERD without esophagitis [K21.9]  Yes   • DM2 (diabetes mellitus, type 2) (CMS/[facility]) [E11.9]  Yes   • CAD (coronary artery disease) [I25.10]  Yes   • CKD (chronic kidney disease) [N18.9]  Yes   • Tobacco abuse [Z72.0]  Yes   • AMS (altered mental status) [R41.82]  Yes   • Essential hypertension [I10]  Yes   • HLD (hyperlipidemia) [E78.5]  Yes      Resolved Hospital Problems   No resolved problems to display.          Hospital Course:  Angel Grace is a 50 y.o. male with a PMH of HLD, HTN, CAD, PVD, T2DM, CKD, GERD, s/p splenectomy, and ongoing tobacco abuse with recent CVA who was admitted on 21 from [facility] for hypoxia and unresponsiveness. Unfortunately his respiratory status and mentation continued to worsen, repeat CT scan  demonstrated subacute brainstem infarction. Patient's sister was contacted and decided to make patient comfort care. He was transitioned to inpatient hospice on .    Day of Discharge     HPI:   See daily progress note from     Vital Signs:   Temp:  [99.1 °F (37.3  °C)-100 °F (37.8 °C)] 100 °F (37.8 °C)  Heart Rate:  [80-96] 92  Resp:  [3-21] 18  BP: (105-171)/(64-93) 171/93     Physical Exam:  See daily progress note    Discharge Details     Discharge Disposition:  Transfer care to inpatient Hospice at [facility]    Time Spent on Discharge:     Taylor Salmeron DO  01/14/21

## 2021-01-14 NOTE — SIGNIFICANT NOTE
[facility] Medicine Services  CRITICAL CARE NOTE    Patient Name: Angel Grace  : 1970  MRN: [id number]    Date of Admission: 2021  Length of Stay: 4    Subjective     Event/HPI:  Called to bedside due to decreased respirations documented at 4/min.  Evaluated at bedside and patient noted to have hypoventilation but also has an pattern of Cheyne-Handley respirations.  He is nonverbal but awake.  He will not follow commands when asked.  But does spontaneously move his left side.  ABG obtained and noted to be compensated.  Case has been discussed with intensivist who will help follow along.  Case also discussed with stroke team.    Objective     Vital Signs:   Temp:  [97.9 °F (36.6 °C)-99.2 °F (37.3 °C)] 99.2 °F (37.3 °C)  Heart Rate:  [] 84  Resp:  [16-21] 16  BP: (105-168)/() 105/64  Total (NIH Stroke Scale): 23    Pertinent Physical Exam:  Awake but doesn't really follow commands or interact  Hypoventilation, crackles  RRR  Responds to painful stimuli and will spont move left side but will not follow commands. No witnessed movement on right. Nonverbal    Results Reviewed:  I have personally reviewed current lab, radiology, and data and agree.    Results from last 7 days   Lab Units 21  0830 21  0621  0635  21  1825   WBC 10*3/mm3 17.54* 22.06* 19.37*   < > 18.96*   HEMOGLOBIN g/dL 13.1 12.8* 13.3   < > 13.7   HEMATOCRIT % 38.5 37.7 39.9   < > 39.8   PLATELETS 10*3/mm3 603* 555* 536*   < > 518*   INR   --   --   --   --  1.02    < > = values in this interval not displayed.     Results from last 7 days   Lab Units 21  0830 21  0606 21  0635  21  1825   SODIUM mmol/L 140 139 135*   < > 138   POTASSIUM mmol/L 3.9 3.7 3.9   < > 3.4*   CHLORIDE mmol/L 103 100 98   < > 99   CO2 mmol/L 29.0 25.0 25.0   < > 28.0   BUN mg/dL 12 8 7   < > 7   CREATININE mg/dL 1.04 1.02 0.96   < > 1.28*   GLUCOSE mg/dL 209* 252* 168*   < > 83    CALCIUM mg/dL 8.6 8.6 8.6   < > 8.7   ALT (SGPT) U/L 8  --   --   --  11   AST (SGOT) U/L 12  --   --   --  16    < > = values in this interval not displayed.     No results found for: BNP  pH, Arterial   Date Value Ref Range Status   01/13/2021 7.364 7.350 - 7.450 pH units Final       Microbiology Results Abnormal     Procedure Component Value - Date/Time    Blood Culture - Blood, Arm, Right [[id number]] Collected: 01/09/21 1820    Lab Status: Preliminary result Specimen: Blood from Arm, Right Updated: 01/13/21 1846     Blood Culture No growth at 4 days    Blood Culture - Blood, Hand, Left [[id number]] Collected: 01/09/21 1823    Lab Status: Preliminary result Specimen: Blood from Hand, Left Updated: 01/13/21 1846     Blood Culture No growth at 4 days    Respiratory Culture - Aspirate, Cough [[id number]] Collected: 01/12/21 2326    Lab Status: Preliminary result Specimen: Aspirate from Cough Updated: 01/13/21 1414     Respiratory Culture No growth     Gram Stain Moderate (3+) Epithelial cells per low power field      Few (2+) WBCs per low power field      No organisms seen    S. Pneumo Ag Urine or CSF - Urine, Urine, Clean Catch [[id number]]  (Normal) Collected: 01/12/21 1202    Lab Status: Final result Specimen: Urine, Clean Catch Updated: 01/12/21 1512     Strep Pneumo Ag Negative    MRSA Screen, PCR (Inpatient) - Swab, Nares [[id number]]  (Normal) Collected: 01/10/21 0052    Lab Status: Final result Specimen: Swab from Nares Updated: 01/10/21 0802     MRSA PCR Negative    Narrative:      MRSA Negative    COVID-19 and FLU A/B PCR - Swab, Nasopharynx [[id number]]  (Normal) Collected: 01/09/21 1825    Lab Status: Final result Specimen: Swab from Nasopharynx Updated: 01/09/21 2053     COVID19 Not Detected     Influenza A PCR Not Detected     Influenza B PCR Not Detected    Narrative:      Fact sheet for providers: https://www.fda.gov/media/944190/download    Fact sheet for patients:  https://www.fda.gov/media/857265/download    Test performed by PCR.          Imaging Results (Last 24 Hours)     ** No results found for the last 24 hours. **             I have reviewed the current medications.    Assessment / Plan     Active Hospital Problems    Diagnosis POA   • **Acute respiratory failure with hypoxia (CMS/[facility]) [J96.01] Yes   • Sepsis due to pneumonia (CMS/[facility]) [J18.9, A41.9] Yes   • Hypokalemia [E87.6] Yes   • APRIL (acute kidney injury) (CMS/[facility]) [N17.9] Yes   • History of CVA (cerebrovascular accident) [Z86.73] Not Applicable   • GERD without esophagitis [K21.9] Yes   • DM2 (diabetes mellitus, type 2) (CMS/[facility]) [E11.9] Yes   • CAD (coronary artery disease) [I25.10] Yes   • CKD (chronic kidney disease) [N18.9] Yes   • Tobacco abuse [Z72.0] Yes   • AMS (altered mental status) [R41.82] Yes   • Essential hypertension [I10] Yes   • HLD (hyperlipidemia) [E78.5] Yes         Pertinent Assessment & Plan:       Acute hypoxic respiratory failure  Hypoventilation with respiratory rate of 4 breaths/min intermittently  Cheyne-Handley respiration  CVA  --Case discussed with intensivist  --BiPAP  --CT head CTA head neck, CT cervical spine pending  --Suspect related to intracranial process/possible brainstem involvement, questionable anoxic injury related to hypoxia for unclear period of time at home  --Out of acetazolamide in the pharmacy  --More awake with Narcan, however has not changed respiratory pattern, no improvement with flumazenil  --Stop Neurontin  --Hold sedating medications  --stroke team notified      CODE STATUS:    Code Status and Medical Interventions:   Ordered at: 01/09/21 0355     Level Of Support Discussed With:    Patient     Code Status:    CPR     Medical Interventions (Level of Support Prior to Arrest):    Full         Critical Care time spent in direct patient care:    45  minutes (excluding procedure time, if applicable) including high complexity decision making to assess and treat  vital organ system failure in this individual who has impairment of one or more vital organ systems such that there is a high probability of imminent or life threatening deterioration in the patient’s condition. Failure to initiate the above interventions on an urgent basis would likely result in sudden, clinically significant or life threatening deterioration in the patient's condition.      Adriano Valverde,   01/13/21

## 2021-01-14 NOTE — PLAN OF CARE
Slept often.  Nonverbal, unable to assess orientation.  Did not follow commands.  Respirations unlabored.  3.5L cannula.  Frequent cough with copious thick secretions.  Oral suctioned.  Prn Robinul twice and secretions/cough improved.  Monitor SR/ST.  PRN Percocet twice for restlessness and facial grimacing.  Keofeed infusing 70ml/hr.  HOB locked >30.  Q2turn with wedge.  Waffle and heel boots in place.  Restraints loosened frequent with position changes. Oral care provided.  No visitors seen.  Awaiting MRI.

## 2021-01-15 NOTE — PLAN OF CARE
Goal Outcome Evaluation:      50 year old black male that was admitted to Hospice on 1/14/2021 for comfort measures.  Administered medication as indicated.  Turn Q 2 hour.  Will continue to monitor throughout the rest of the shift.

## 2021-01-15 NOTE — PROGRESS NOTES
Hospice Progress Note    Patient Name: Angel Grace   : 1970  Gender: male    Code Status: comfort measures    Date of Admission: 2021    Subjective:    50yoM admitted inpt Hospice  for brainstem stroke.    Overnight notes reviewed: Administered medication as indicated.  Turn Q 2 hour.  Will continue to monitor throughout the rest of the shift.    Today, pt is comfortable. Minimally responsive - tried to open eyes once during visit/exam. No family at bedside.    - PRNs:  Haldol 2mg x2  toradol 15mg x1  Ativan 1mg x1  Ativan 2mg x2  Morphine 4mg x2    - Held: none    - Intake/Output  *PO: receiving keofeed until   *Urine: osorio placed upon Hospice admission  *LBM: , small      ROS:  Review of Systems   Unable to perform ROS: Acuity of condition       Reviewed current scheduled and prn medications for route, type, dose and frequency.     •  acetaminophen  •  bisacodyl  •  glycopyrrolate  •  haloperidol lactate  •  ketorolac  •  LORazepam **OR** LORazepam  •  Morphine  •  ondansetron  •  palliative care oral rinse  •  PHENobarbital  •  polyvinyl alcohol  •  sodium chloride    Objective:   /89 (BP Location: Right arm, Patient Position: Lying)   Pulse 95   Temp 99.7 °F (37.6 °C) (Axillary)   Resp 16   SpO2 97%      PPS: Palliative Performance Scale score as of 1/15/2021, 13:22 EST is 10% based on the following measures:   Ambulation: Totally bed bound  Activity and Evidence of Disease: Unable to do any work, extensive evidence of disease  Self-Care: Total care  Intake:  Mouth care only  LOC: Drowsy or coma      Physical Exam:  Physical Exam  Constitutional:       Appearance: He is obese.      Comments: Comfortable; minimally responsive to light touch; audibly congested.   HENT:      Head: Normocephalic and atraumatic.      Mouth/Throat:      Mouth: Mucous membranes are dry.   Eyes:      Comments: Closed; pinpoint pupils b/l   Neck:      Musculoskeletal: Neck supple.      Comments:  Increased neck circumference  Cardiovascular:      Comments: Difficult to appreciate HT d/t respiratory acoustics  + radial pulses  RRR  Pulmonary:      Comments: + audible congestion; Coarse inspir/expir rhonchi  Abdominal:      Comments: Obese abd  BSx4  Soft, ND/NT   Skin:     General: Skin is dry.      Comments: Dry flaky skin BLE   Neurological:      Comments: Does not follow commands   Psychiatric:      Comments: No facial grimacing; no moaning           Brainstem stroke (CMS/HCC)      Assessment/Plan:     50yoM admitted in Hospice 1/14 for brainstem stroke.     Agitation/restlessness  AMS/unconscienceness  Congestion  Constipation  End of Life Care    Requiring numerous prns for comfort.    Increase scheduled Ativan q6h to 1mg     Increase scheduled Morphine 4mg q6h    Continue scheduled and prn palliative mouth rinse    Continue scheduled and prn eye gtts    Prns reviewed/adjusted for comfort    Schedule Lasix 20mg q6h    Scop patch    Coordinated care with Nursing    Discharge Disposition: Actively Dying    Total Visit Time: 20min  Face to Face Time: 10min    Justification for care:  Patient meets criteria for acute in-patient care with required nursing assessment and interventions for symptoms with IV medications.      Miladys Albert, KRISTA, MHA, APRN  [facility]  Hospice and Palliative Care Nurse Practitioner  01/15/21  12:58 EST

## 2021-01-15 NOTE — PROGRESS NOTES
Continued Stay Note  [facility]     Patient Name: Angel Grace  MRN: [id number]  Today's Date: 1/15/2021    Admit Date: 1/14/2021       PPS 20 % Angel Grace is a 49 yo male who resides at [facility] and was found with altered mental status and was brought to [facility] for further evaluation. Pt had a Brainstem stroke. Pt is resting comfortably on my visit with no restraints needed. Pt is minimally responsive and will open his eyes but doesn’t track. Breathe sounds are coarse with short apnea spells, O2 d/c. HR reg and pulses strong. Pt can move his left arm and still has fairly good strength. Skin is warm and dry, bilateral legs very dry and scaly.  Griffin placed 1/14 and drained 400 cc miguel a urine, last BM 1/13, BS hypoactive. No family at bedside. Will call and update family. Pt requires GIP for skilled nursing assessment and intervention with IV medications at EOL. Will continue to monitor and intervene as needed      Merly Hinojosa RN

## 2021-01-15 NOTE — PROGRESS NOTES
Clinical Nutrition     Reason for Visit:   Follow-up protocol    Patient Name: Angel Grace  YOB: 1970  MRN: [id number]  Date of Encounter: 01/15/21 07:54 EST  Admission date: 1/14/2021    Comments: Patient unresponsive. Following discussion with family, patient made comfort measures. Admitted to inpatient hospice 1/14. Clinical nutrition will sign off. Available PRN    Sharon Judd RDN, LD  Time Spent: 10 min

## 2021-01-16 NOTE — PROGRESS NOTES
Continued Stay Note  [facility]     Patient Name: Angel Grace    Today's Date: 1/16/2021    Admit Date: 1/14/2021    Assessment Note:  PPS 20 % Angel Grace is a 49 yo male admitted to hospice with CVA 1/14. Patient opens eyes to assessment and treatments but does not appear to track and has zero verbal response. Overall appears very peaceful. Audible congestion not responding to diuretics. NT suction a large amount of thick mucus from upper airway. Ua draining to bedside via osorio cath dark yellow. Patient has received multiple prns for respiratory and pain control. Patient has been febrile but seems to respond to the toradol. Skin warm dry and intact. Continue to monitor for non-verbal indications of pain.  Discharge plan:  Currently patient remains GIP for complications of EOL care requiring skilled nursing and medical management of symptoms. Discharge plan dependent on a decreased level of care and survival of this admission.      POC:  Family support and education R/T EOL care      Hospice Criteria:  Hospice care provides support and care for persons and their families with a life limiting disease. Hospice is a Medicare benefit and requires   1) Patient prognosis is 6 months or less to live,   2) Patient no longer going through curative therapies.   3) Agreement of 2 physicians that patient's condition is life limiting and will most likely result in death in <6 months.  Hospice is a Level of care not a location and can be provided in various settings based on patient's needs.    Medicare guidelines state that hospice patients in the hospital require skilled nursing, medical interventions and a level of care that cannot be met in any other setting. This stay is most commonly less then two week.   A patient may be Hospice appropriate but not inpatient appropriate based on this criteria.  Once a Hospice referral is reviewed and  appears to be stable enough to be transferred to a lower level of care with  hospice then that is the preference.     Emily Parnell MSN. RN. CHPN.  [facility] Nav[facility]ators  Hospice Care  [phone]

## 2021-01-16 NOTE — PLAN OF CARE
Goal Outcome Evaluation:   Angel Grace is a 50 year old black male that was admitted to hospice care on 1/14/2021.  He is on day 2 of his care.  He 2 IV's in place in his left arm that is patent to a saline lock.  He has a FC to SD in place secured to his leg.  He has been turned for comfort.  Have administered medication as indicated and for comfort measures.  Completed his personal hygiene, CHG and osorio care without difficulty, patient tolerated well.  Will continue to monitor patient throughout the rest of the shift.

## 2021-01-16 NOTE — PLAN OF CARE
Problem: Skin Injury Risk Increased  Goal: Skin Health and Integrity  Outcome: Ongoing, Progressing  Intervention: Optimize Skin Protection  Recent Flowsheet Documentation  Taken 1/16/2021 0800 by Marshall Yang RN  Pressure Reduction Techniques:   frequent weight shift encouraged   weight shift assistance provided  Pressure Reduction Devices:   pressure-redistributing mattress utilized   specialty bed utilized  Skin Protection:   adhesive use limited   incontinence pads utilized     Problem: Fall Injury Risk  Goal: Absence of Fall and Fall-Related Injury  Outcome: Ongoing, Progressing  Intervention: Promote Injury-Free Environment  Recent Flowsheet Documentation  Taken 1/16/2021 0800 by Marshall Yang, RN  Safety Promotion/Fall Prevention:   safety round/check completed   toileting scheduled   Goal Outcome Evaluation:      Hospice, resting comfortably.

## 2021-01-17 NOTE — PLAN OF CARE
Goal Outcome Evaluation:   50 year old black male that was admitted to hospice on 1/14/2021 which is day 3 of this admission.  He has IV's x 2 in place and patent.  FC to SD in place and patent an adequate amount of UOP noted.  Have turned for comfort and personal hygiene and cath care completed this shift.  Have administered medication as indicated and for comfort measures.  Will continue to monitor patieht throughout the rest of the shift.

## 2021-01-17 NOTE — PROGRESS NOTES
Hospice Progress Note      Code Status and Medical Interventions:   Ordered at: 01/14/21 1454     Code Status:    No CPR     Medical Interventions (Level of Support Prior to Arrest):    Comfort Measures        Subjective   S:  Medical record reviewed. Events noted.     PRNs in last 24hr:   Haldol X 2  toradol X 3  Phenobarbital X 1  Morphine 4mg IV X 3     LBM 1/13    Intake mouthcare only     51 yo M with brainstem CVA 1/14/2021.  Related diagnosis: MARY, prior CVAs (10/2020, 1/3/21), aphasia, secretions, dysphagia, right hemiparesis.  Baseline function prior to most recent CVA was ability to track with eyes and answer yes/no questions by shaking head.  He has had copious secretions with multiple scheduled and PRN medications required for mgmt.  He continues to have periods of apnea. He appears comfortable at time of assessment.  Opens eyes but non-verbal and unable to make needs known.      ROS: unable to obtain from patient.  ROS per chart review and hospice RN    PMH/PSH/PFH: Reviewed, no changes    Allergies   Allergen Reactions   • Penicillins Unknown - Low Severity     Has tolerated cefepime       Current Facility-Administered Medications   Medication Dose Route Frequency Provider Last Rate Last Admin   • acetaminophen (TYLENOL) suppository 650 mg  650 mg Rectal Q4H PRN Maddison Shah MD       • bisacodyl (DULCOLAX) suppository 10 mg  10 mg Rectal Daily PRN Maddison Shha MD       • furosemide (LASIX) injection 20 mg  20 mg Intravenous Q6H PRN Miladys Albert, APRN   20 mg at 01/16/21 1317   • furosemide (LASIX) injection 20 mg  20 mg Intravenous Q6H Miladys Albert, APRN   20 mg at 01/17/21 0354   • glycopyrrolate (ROBINUL) injection 0.4 mg  0.4 mg Intravenous Q4H PRN Maddison Shah MD   0.4 mg at 01/16/21 1411   • haloperidol lactate (HALDOL) injection 2 mg  2 mg Intravenous Q4H PRN Maddison Shah MD   2 mg at 01/16/21 0809   • ketorolac (TORADOL) injection 15 mg  15 mg Intravenous Q6H PRN  Maddison Shah MD   15 mg at 01/16/21 0814   • LORazepam (ATIVAN) injection 1 mg  1 mg Intravenous Q2H PRN Maddison Shah MD   1 mg at 01/16/21 1411    Or   • LORazepam (ATIVAN) injection 2 mg  2 mg Intravenous Q2H PRN Maddison Shah MD   2 mg at 01/14/21 2121   • LORazepam (ATIVAN) injection 1 mg  1 mg Intravenous Q6H Miladys Albert APRN   1 mg at 01/17/21 0354   • Morphine sulfate (PF) injection 4 mg  4 mg Intravenous Q1H PRN Maddison Shah MD   4 mg at 01/16/21 1317   • Morphine sulfate (PF) injection 4 mg  4 mg Intravenous Q6H Miladys Albert APRN   4 mg at 01/17/21 0353   • palliative care oral rinse   Mouth/Throat TID Maddison Shah MD   Given at 01/16/21 2137   • palliative care oral rinse   Mouth/Throat PRN Maddison Shah MD   Given at 01/15/21 0734   • PHENobarbital injection 65 mg  65 mg Intravenous Q6H PRN Maddison Shah MD   65 mg at 01/16/21 1317   • polyvinyl alcohol (LIQUIFILM) 1.4 % ophthalmic solution 2 drop  2 drop Both Eyes Q1H PRN Maddison Shah MD       • polyvinyl alcohol (LIQUIFILM) 1.4 % ophthalmic solution 2 drop  2 drop Both Eyes BID Miladys Albert APRN   2 drop at 01/16/21 2136   • Scopolamine (TRANSDERM-SCOP) 1.5 MG/3DAYS patch 1 patch  1 patch Transdermal Q72H Miladys Albert APRN   1 patch at 01/15/21 1533   • sodium chloride 0.9 % flush 10 mL  10 mL Intravenous PRN Maddison Shah MD   10 mL at 01/17/21 0354          Current medication reviewed for route, type, dose and frequency and are current per MAR at time of review.    Objective   /89 (BP Location: Right arm, Patient Position: Lying)   Pulse 95   Temp 99.7 °F (37.6 °C) (Axillary)   Resp 16   SpO2 97%     Intake/Output Summary (Last 24 hours) at 1/17/2021 0742  Last data filed at 1/17/2021 0500  Gross per 24 hour   Intake --   Output 2250 ml   Net -2250 ml       PPS: 10%  Physical Examination:   GENERAL: eyes closed during exam, does not respond to touch or verbal stimuli,  15-20 sec periods of apnea, sitting with HOB elevated approx 45 degrees, audible oral/upper airway secretions, no grimace with PROM  HEENT: no scleral icterus, missing teeth, mucus membranes moist with palliative oral rinse, jaw laxity  NECK: no JVD, no LAD, no rigidity  LUNGS: no wheezes, non-labored resp effort, occ wet but non-productive cough, upper airway secretions and audible rhonchi, decreased BS bilateral bases, no rales  CV: RRR, distant heart sounds  ABDOMEN: decreased frequency but normal pitch BS, non-distended, soft, no grimace to abd palpation, obese  EXTREMITIES: BLE 1+ edema, radial and pedal pulses palpable  NEURO: unable to follow commands, does not arouse today during visit, no spontaneous movement  PSYCH: no agitation, no facial grimace at time of visit  MSK: normal muscle bulk, decreased muscle tone generalized, no tremor    Labs/Diagnostics/Clinical Data: Reviewed.    Assessment/Plan    Patient Active Problem List   Diagnosis   • Sepsis due to pneumonia (CMS/[facility])   • Hypokalemia   • APRIL (acute kidney injury) (CMS/[facility])   • History of CVA (cerebrovascular accident)   • GERD without esophagitis   • DM2 (diabetes mellitus, type 2) (CMS/[facility])   • CAD (coronary artery disease)   • CKD (chronic kidney disease)   • Acute respiratory failure with hypoxia (CMS/[facility])   • Tobacco abuse   • AMS (altered mental status)   • Essential hypertension   • HLD (hyperlipidemia)   • Brainstem stroke (CMS/[facility])       Assessment/Problems:   1. Brainstem CVA - Increased symptom needs.  Anticipate EOL/comfort care inpt hospice due to multiple injections scheduled and PRN required for symptom mgmt. Sister calls to check on him frequently.    2. Secretions - He continues to require PRN medications despite multiple scheduled medications for secretions mgmt.  Cont furosemide Q6hr ATC (added 1/15) and scopolamine.  Cont Robinul and furosemide PRN.  Consider add atropine drops if not improved on current regimen.    3.  Agitation/seizure prophylaxis - cont lorazepam 1mg Q6hr ATC (increased 1/15).  Cont lorazepam, phenobarbital, Haldol PRN.    4. Dyspnea - cont morphine 4mg Q6hr scheduled (increased 1/15).  Cont PRN morphine (PRN X 3 required in last 24hr). He continues to have periods of apnea.       Goals of Care: anticipate EOL care inpt hospice due to medication and care needs not able to be met in lower LOC setting.  If he stabilizes, then he will required LTCF placement.      Family/Support/Spiritual: no family present at time of visit today.  Hospice RN Emily made phone contact with sister today to update re. Status and offer support.  Pt/sister's mother recently  in MVA.  Per sister, Angel has not been dealing well with his mother's death.            Justification: Patient continues to meet criteria for Acute In-patient Care due to need for frequent scheduled and PRN injection medications required for symptom mgmt, numerous PRNs required for comfort, need for frequent nurse assessment, EOL care.      Time:  25 min spent in the care of this patient. That time was spent reviewing medical record, obtaining hx from hospice RN, examining pt, formulating POC, documentation.     Raiza Lerma MD

## 2021-01-17 NOTE — PROGRESS NOTES
Continued Stay Note  [facility]     Patient Name: Angel Grace    Today's Date: 1/17/2021    Admit Date: 1/14/2021    Assessment Note:  1/17 PPS 20 % Angel Grace is a 51 yo male admitted to hospice with CVA 1/14. Patient appears very peaceful during nursing visit. Prolonged periods of apnea and shallow unlabored respirations. Patient’s skin remains warm and dry. Secretions are much improved from previous visits. Zero family present. Spoke to sister by phone gave update. Patient has a osorio cath draining yellow ua to bedside. LBM 1/13. Patient has required 6 Prns in last 24 hours for treatment of secretions and dyspnea. Continue to monitor for non-verbal signs of pain or discomfort.  Discharge plan:  Currently patient remains GIP for complications of EOL care requiring skilled nursing and medical management of symptoms. Discharge plan dependent on a decreased level of care and survival of this admission.      POC:  Family support and education R/T EOL care      Hospice Criteria:  Hospice care provides support and care for persons and their families with a life limiting disease. Hospice is a Medicare benefit and requires   1) Patient prognosis is 6 months or less to live,   2) Patient no longer going through curative therapies.   3) Agreement of 2 physicians that patient's condition is life limiting and will most likely result in death in <6 months.  Hospice is a Level of care not a location and can be provided in various settings based on patient's needs.    Medicare guidelines state that hospice patients in the hospital require skilled nursing, medical interventions and a level of care that cannot be met in any other setting. This stay is most commonly less then two week.   A patient may be Hospice appropriate but not inpatient appropriate based on this criteria.  Once a Hospice referral is reviewed and  appears to be stable enough to be transferred to a lower level of care with hospice then that is the  preference.     Emily Parnell MSN. RN. PN.  [facility] Navigators  Hospice Care  [phone]

## 2021-01-18 NOTE — PROGRESS NOTES
Continued Stay Note  [facility]     Patient Name: Angel Grace    Today's Date: 1/18/2021    Admit Date: 1/14/2021    Assessment Note:  1/18 PPS 10 % Angel Grace is a 49 yo male admitted to hospice with CVA 1/14. Patient is reported to have had a very rough day lots of secretions and dyspnea. Patient required Multiple prns of Lasix, Robinul morphine and Ativan to maintain his comfort. This morning he appears very peaceful without audible secretions. Patient continues to have 20 sec apnea and very shallow respirations. Feet and hands remain warm to touch. Ua is draining by Griffin Cath dark brown. Output has decreased but still 950ML. Patients family will be notified by phone with update. Continue to monitor for nonverbal  indications of pain or discomfort.     Discharge plan:    Currently patient remains GIP for complications of EOL care requiring skilled nursing and medical management of symptoms. Discharge plan dependent on a decreased level of care and survival of this admission.      POC:  Family support and education R/T EOL care  Dyspnea R/T disease process  Somatic Pain R/T immobility  Hospice Criteria:  Hospice care provides support and care for persons and their families with a life limiting disease. Hospice is a Medicare benefit and requires   1) Patient prognosis is 6 months or less to live,   2) Patient no longer going through curative therapies.   3) Agreement of 2 physicians that patient's condition is life limiting and will most likely result in death in <6 months.  Hospice is a Level of care not a location and can be provided in various settings based on patient's needs.    Medicare guidelines state that hospice patients in the hospital require skilled nursing, medical interventions and a level of care that cannot be met in any other setting. This stay is most commonly less then two week.   A patient may be Hospice appropriate but not inpatient appropriate based on this criteria.  Once a Hospice  referral is reviewed and  appears to be stable enough to be transferred to a lower level of care with hospice then that is the preference.     Emily VERONICA. RN. PN.  [facility] Navigators  Hospice Care  [phone]

## 2021-01-18 NOTE — PROGRESS NOTES
Hospice Progress Note    Code Status and Medical Interventions:   Ordered at: 01/14/21 1454     Code Status:    No CPR     Medical Interventions (Level of Support Prior to Arrest):    Comfort Measures          Subjective    CC: unable to verbalize    Reason for visit: symptom mgmt, f/u GIP hospice provider visit, f/u medication effectiveness    S: Medical record reviewed. Events noted.    PRNs in last 24hr:   Lasix 20mg IV X 1  Robinul 400 mcg X 1  Morphine 4mg IV X 1    LBM 1/13    Intake mouthcare only/output 2250 mL    49 yo M with brainstem CVA 1/14/2021.  Related diagnosis: MARY, prior CVAs (10/2020, 1/3/21), aphasia, secretions, dysphagia, right hemiparesis.  Baseline function prior to most recent CVA was ability to track with eyes and answer yes/no questions by shaking head.  He has had copious secretions with multiple scheduled and PRN medications required for mgmt.  He continues to have periods of apnea. He appears comfortable at time of assessment.  Opens eyes but non-verbal and unable to make needs known.        ROS: unable to obtain from patient.  ROS as above per chart review and discussion with [facility] floor nurse, Merly.     PMH/PSH/PFH: Reviewed, no changes    Allergies   Allergen Reactions   • Penicillins Unknown - Low Severity     Has tolerated cefepime       Current Facility-Administered Medications   Medication Dose Route Frequency Provider Last Rate Last Admin   • acetaminophen (TYLENOL) suppository 650 mg  650 mg Rectal Q4H PRN Maddison Shah MD       • bisacodyl (DULCOLAX) suppository 10 mg  10 mg Rectal Daily PRN Maddison Shah MD       • furosemide (LASIX) injection 20 mg  20 mg Intravenous Q6H PRN Miladys Albert APRN   20 mg at 01/16/21 1317   • furosemide (LASIX) injection 20 mg  20 mg Intravenous Q6H Miladys Albert APRN   20 mg at 01/17/21 2141   • glycopyrrolate (ROBINUL) injection 0.4 mg  0.4 mg Intravenous Q4H PRN Maddison Shah MD   0.4 mg at 01/16/21 1411   • haloperidol  lactate (HALDOL) injection 2 mg  2 mg Intravenous Q4H PRN Maddison Shah MD   2 mg at 01/16/21 0809   • ketorolac (TORADOL) injection 15 mg  15 mg Intravenous Q6H PRN Maddison Shah MD   15 mg at 01/16/21 0814   • LORazepam (ATIVAN) injection 1 mg  1 mg Intravenous Q2H PRN Maddison Shah MD   1 mg at 01/16/21 1411    Or   • LORazepam (ATIVAN) injection 2 mg  2 mg Intravenous Q2H PRN Maddison Shah MD   2 mg at 01/14/21 2121   • LORazepam (ATIVAN) injection 1 mg  1 mg Intravenous Q6H Miladys Albert APRN   1 mg at 01/17/21 2141   • Morphine sulfate (PF) injection 4 mg  4 mg Intravenous Q1H PRN Maddison Shah MD   4 mg at 01/16/21 1317   • Morphine sulfate (PF) injection 4 mg  4 mg Intravenous Q6H Miladys Albert APRN   4 mg at 01/17/21 2141   • palliative care oral rinse   Mouth/Throat TID Maddison Shah MD   Given at 01/17/21 2141   • palliative care oral rinse   Mouth/Throat PRN Maddison Shah MD   Given at 01/15/21 0734   • PHENobarbital injection 65 mg  65 mg Intravenous Q6H PRN Maddison Shah MD   65 mg at 01/16/21 1317   • polyvinyl alcohol (LIQUIFILM) 1.4 % ophthalmic solution 2 drop  2 drop Both Eyes Q1H PRN Maddison Shah MD       • polyvinyl alcohol (LIQUIFILM) 1.4 % ophthalmic solution 2 drop  2 drop Both Eyes BID Miladys Albert APRN   2 drop at 01/17/21 2141   • Scopolamine (TRANSDERM-SCOP) 1.5 MG/3DAYS patch 1 patch  1 patch Transdermal Q72H Miladys Albert APRN   1 patch at 01/15/21 1533   • sodium chloride 0.9 % flush 10 mL  10 mL Intravenous PRN Maddison Shah MD   10 mL at 01/17/21 0354     Current medication reviewed for route, type, dose and frequency and are current per MAR at time of assessment/chart review.    Objective   /89 (BP Location: Right arm, Patient Position: Lying)   Pulse 95   Temp 99.7 °F (37.6 °C) (Axillary)   Resp 16   SpO2 97%     Intake/Output Summary (Last 24 hours) at 1/17/2021 9167  Last data filed at 1/17/2021  1100  Gross per 24 hour   Intake --   Output 1400 ml   Net -1400 ml       PPS: 10%  Physical Examination:   GENERAL: eyes closed but opens eyes with intial touch, does not track, 15-20 sec periods of apnea, sitting with HOB elevated approx 45 degrees, audible oral/upper airway secretions, no grimace with PROM  HEENT: no scleral icterus, eyes to not look to left past midline, missing teeth, mucus membranes moist with palliative oral rinse, jaw laxity  NECK: no JVD, no LAD, no rigidity  LUNGS: no wheezes, non-labored resp effort, occ wet but non-productive cough, upper airway secretions, decreased BS bilateral bases, no rales  CV: RRR, distant heart sounds  ABDOMEN: decreased frequency but normal pitch BS, non-distended, soft, no grimace to abd palpation, obese  EXTREMITIES: BLE 1+ edema, radial and pedal pulses palpable  NEURO: unable to follow commands or track movement, no spontaneous movement  PSYCH: no agitation, no facial grimace  MSK: normal muscle bulk, decreased muscle tone generalized, no tremor    Labs/Diagnostics/Clinical Data: Reviewed.    Assessment/Plan    Patient Active Problem List   Diagnosis   • Sepsis due to pneumonia (CMS/[facility])   • Hypokalemia   • APRIL (acute kidney injury) (CMS/[facility])   • History of CVA (cerebrovascular accident)   • GERD without esophagitis   • DM2 (diabetes mellitus, type 2) (CMS/[facility])   • CAD (coronary artery disease)   • CKD (chronic kidney disease)   • Acute respiratory failure with hypoxia (CMS/[facility])   • Tobacco abuse   • AMS (altered mental status)   • Essential hypertension   • HLD (hyperlipidemia)   • Brainstem stroke (CMS/[facility])       Assessment/Plan:   1. Brainstem CVA - Increased symptom needs.  Anticipate EOL/comfort care inpt hospice due to multiple injections scheduled and PRN required for symptom mgmt. Sister calls to check on him frequently.    2. Secretions - He continues to require PRN medications despite multiple scheduled medications for secretions mgmt.  O>>I (5266  out overnight with scheduled Lasix).  When secretions improve with fewer PRNs required, then consider decrease frequency of scheduled furosemide.  Cont furosemide Q6hr ATC and scopolamine.  Cont Robinul and furosemide PRN.  Consider add atropine drops if not improved on current regimen.    3. Agitation/seizure prophylaxis - cont lorazepam 1mg Q6hr ATC.  Cont lorazepam, phenobarbital, Haldol PRN.    4. Dyspnea - cont morphine 4mg Q6hr scheduled.  Cont PRN morphine (additional dose required overnight). He continues to have periods of apnea.      Goals of Care: anticipate EOL care inpt hospice due to medication and care needs not able to be met in lower LOC setting.  If he stabilizes, then he will required LTCF placement.     Family/Support/Spiritual: no family present at time of visit today.  Hospice RN Emily made phone contact with sister yesterday.           Justification: Patient continues to meet criteria for Acute In-patient Care due to need for frequent scheduled and PRN injection medications required for symptom mgmt, numerous PRNs required for comfort, need for frequent nurse assessment, EOL care.     Time:  25 min spent in the care of this patient. That time was spent reviewing medical record, obtaining hx from floor RN Merly, examining pt, formulating POC, documentation.     Raiza Lerma MD  1/17/2021

## 2021-01-18 NOTE — PLAN OF CARE
Problem: Skin Injury Risk Increased  Goal: Skin Health and Integrity  1/18/2021 0439 by Jojo Chacon RN  Outcome: Ongoing, Progressing   Goal Outcome Evaluation:

## 2021-01-18 NOTE — PROGRESS NOTES
Hospice Progress Note    Date of Admission: 1/14/2021    Subjective:  No acute events but multiple PRNs needed yesterday - better today.  UO down, dark.  Apnea spellss now present.      On room air  Osorio anchored    Meds reviewed  PRN use- lasix x1, Robinul x2, toradol x1, ativan x1, morphine x1     Scheduled Meds:furosemide, 20 mg, Intravenous, Q6H  ketorolac, 15 mg, Intravenous, Q6H  LORazepam, 1 mg, Intravenous, Q6H  Morphine, 4 mg, Intravenous, Q6H  palliative care oral rinse, , Mouth/Throat, TID  polyvinyl alcohol, 2 drop, Both Eyes, BID  Scopolamine, 1 patch, Transdermal, Q72H    PRN Meds:.•  acetaminophen  •  bisacodyl  •  furosemide  •  glycopyrrolate  •  haloperidol lactate  •  ketorolac  •  LORazepam **OR** LORazepam  •  Morphine  •  palliative care oral rinse  •  PHENobarbital  •  polyvinyl alcohol  •  sodium chloride      Objective: /89 (BP Location: Right arm, Patient Position: Lying)   Pulse 95   Temp 99.7 °F (37.6 °C) (Axillary)   Resp 16   SpO2 97%      Intake/Output Summary (Last 24 hours) at 1/18/2021 1735  Last data filed at 1/18/2021 1700  Gross per 24 hour   Intake --   Output 875 ml   Net -875 ml     Physical Exam:  Gen - young, ill, black male, in bed, NAD  Head/Neck - NC/AT, trachea midline  Eyes - closed, no lid edema  ENT - mouth dry, slight secretion rattling  Heart - regular, tachy, no murmur  Lungs - diminished, respirations unlabored though RR slightly increased with exam/stimulation  Abd - soft, nontender  - osorio, dark yellow brown urine  Ext -  no edema or cyanosis  Skin - warm, dry  Neuro -  not awake or alert, slight breathing change with stimulation of exam but no other response to voice or touch, no myoclonus  Psych - no grimacing or verbalization    Results from last 7 days   Lab Units 01/13/21  0830   WBC 10*3/mm3 17.54*   HEMOGLOBIN g/dL 13.1   HEMATOCRIT % 38.5   PLATELETS 10*3/mm3 603*     Results from last 7 days   Lab Units 01/13/21  0830   SODIUM mmol/L 140    POTASSIUM mmol/L 3.9   CHLORIDE mmol/L 103   CO2 mmol/L 29.0   BUN mg/dL 12   CREATININE mg/dL 1.04   CALCIUM mg/dL 8.6   BILIRUBIN mg/dL 0.6   ALK PHOS U/L 114   ALT (SGPT) U/L 8   AST (SGOT) U/L 12   GLUCOSE mg/dL 209*       Impression:   50yoM w/ recurrent CVA, now brainstem involvement; tobacco and cocaine hx; comorbid respiratory and sepsis from aspiration PNA.     Symptoms:  Agitation, Restlessness  Encephalopathy, Lethargy  Congestion, Secretions  Debility, Dysphagia, Aphasia  MSK pain  Seizure prophylaxis     Plan:   -Increase PRN lasix to 40mg.  -Continue ATC Lasix.  -Scop patch in place.  -Continue Q6 Morphine and Ativan.    Maddison Shah MD  01/18/21

## 2021-01-18 NOTE — PLAN OF CARE
Goal Outcome Evaluation:  Plan of Care Reviewed With: patient  Progress: declining     Patient non responsive during shift. Urine dark miguel a in osorio. Secretion management not needed; oral care provided. No family at bedside.

## 2021-01-19 NOTE — PLAN OF CARE
Goal Outcome Evaluation:  Plan of Care Reviewed With: patient  Progress: declining  Outcome Summary: comfort measures continued

## 2021-01-19 NOTE — SIGNIFICANT NOTE
Exam confirms with auscultation zero audible heart tones and zero audible respirations. Mr.Aaron Grace was pronounced dead at 1730, 01/19/2021.  MD notified by Patient's RN.    Abdullahi Lozada RN  Clinical House Supervisor  1/19/2021 18:01 EST

## 2021-01-19 NOTE — PROGRESS NOTES
"Hospice Progress Note    Date of Admission: 1/14/2021    Subjective:      No acute events, needs, or concerns.  No family present for visit.    Meds reviewed.  PRN robinul given x1           Questions for Current Code Status     Question Answer Comment    Code Status No CPR     Medical Interventions (Level of Support Prior to Arrest) Comfort Measures       Scheduled Meds:furosemide, 20 mg, Intravenous, Q6H  ketorolac, 15 mg, Intravenous, Q6H  LORazepam, 1 mg, Intravenous, Q6H  Morphine, 4 mg, Intravenous, Q6H  palliative care oral rinse, , Mouth/Throat, TID  polyvinyl alcohol, 2 drop, Both Eyes, BID  Scopolamine, 1 patch, Transdermal, Q72H      PRN Meds:.•  acetaminophen  •  bisacodyl  •  furosemide  •  glycopyrrolate  •  haloperidol lactate  •  LORazepam **OR** LORazepam  •  Morphine  •  palliative care oral rinse  •  PHENobarbital  •  polyvinyl alcohol  •  sodium chloride    Objective: /89 (BP Location: Right arm, Patient Position: Lying)   Pulse 95   Temp 99.7 °F (37.6 °C) (Axillary)   Resp 16   SpO2 97%      Intake/Output Summary (Last 24 hours) at 1/19/2021 1734  Last data filed at 1/19/2021 0504  Gross per 24 hour   Intake --   Output 350 ml   Net -350 ml     Physical Exam:  Gen - young, ill, black male, in bed, NAD  Head/Neck - NC/AT, trachea midline  Eyes - closed, no lid edema  ENT - mouth dry, secretions rattling  Heart - regular, no murmur  Lungs - congested, short unlabored respirations; occasional larger \"gasp-like\" respiration  Abd - soft, nontender  - osorio, dark orange urine  Ext -  no edema or cyanosis  Skin - warm, dry  Neuro -  not awake or alert, no response to voice or touch, no myoclonus  Psych - no grimacing or verbalization    Results from last 7 days   Lab Units 01/13/21  0830   WBC 10*3/mm3 17.54*   HEMOGLOBIN g/dL 13.1   HEMATOCRIT % 38.5   PLATELETS 10*3/mm3 603*     Results from last 7 days   Lab Units 01/13/21  0830   SODIUM mmol/L 140   POTASSIUM mmol/L 3.9   CHLORIDE " mmol/L 103   CO2 mmol/L 29.0   BUN mg/dL 12   CREATININE mg/dL 1.04   CALCIUM mg/dL 8.6   BILIRUBIN mg/dL 0.6   ALK PHOS U/L 114   ALT (SGPT) U/L 8   AST (SGOT) U/L 12   GLUCOSE mg/dL 209*       Impression:   50yoM w/ recurrent CVA, now brainstem involvement; tobacco and cocaine hx; comorbid respiratory and sepsis from aspiration PNA.     Symptoms:  Agitation, Restlessness  Encephalopathy, Lethargy  Congestion, Secretions  Debility, Dysphagia, Aphasia  MSK pain  Seizure prophylaxis    Plan:  -Bump ATC morphine dosing.  -Bump lasix dose to 40mg.   -Reorder Toradol.  -Prognosis hours to days.    Maddison Shah MD  01/19/21

## 2021-01-19 NOTE — PROGRESS NOTES
Continued Stay Note  [facility]     Patient Name: Angel Grace  MRN: [id number]  Today's Date: 1/19/2021    Admit Date: 1/14/2021      PPS 10 %  Angel Grace is a 51 yo male admitted to hospice with CVA 1/14. Pt is nonresponsive and resting comfortably. Breathe sounds coarse and congested. Pt was able to cough but not able to expel. HR tachy, pulses strong, cap refill sluggish. Skin warm and dry. Griffin drained 650 cc dark miguel a urine, last BM 1/13, BS hypoactive. PRN robinul 2.4 ngx 1, Ativan 1 mg x 1, morphine 4 mg x 1/ 24 hr. given for anxiety, pain and secretions. Pt is much more comfortable today. No family at bedside and will update the sister. Patient meets criteria for acute in-patient care with required nursing assessment and interventions for symptoms with IV medications      Merly Hinojosa RN

## 2021-01-20 NOTE — DISCHARGE SUMMARY
Date of Death:  1/19/2021  Time of Death:  1730    Presenting Problem/History of Present Illness    Brainstem stroke (CMS/HCC)      Hospital Course    49yo M w/ PMH of HLD, HTN, CAD, PVD, T2DM, CKD, GERD, s/p splenectomy, and ongoing tobacco abuse.  Prior stroke in October 2020; remained independent, conversant.  Hospitalized in [place] 1/3/21 after presenting with aphasia and right facial droop.  Found to have new stroke; UDS was cocaine positive at that time.  Discharged to [facility] rehab with persistent dysphagia; keofeed in place for nutrition.  Rehospitalized 1/9/21 with hypoxia and unresponsiveness. Despite maximized medical treatment for his hypoxic respiratory failure felt due to aspiration PNA w/ sepsis (including oxygen, broad abx, nebs, and suctioning), his respiratory status and mentation continued to worsen.  Required restraints to maintain appropriate medical therapy.  Repeat head CT 1/14 demonstrated subacute brainstem infarction. Given dismal prognosis, patient's sister elected to transition patient to full comfort focused care. Reports patient would never want to live in a debilitated state with a feeding tube.     Patient nonverbal, not following commands.  Remains restrained; keofeed in place with TF running at 25ml/hr; on 4L NC.    Social History:  Social History     Tobacco Use   • Smoking status: Current Every Day Smoker     Types: Cigarettes   Substance Use Topics   • Alcohol use: Defer         Consults:   Consults     Date and Time Order Name Status Description    1/14/2021 0129 Inpatient Neurology Consult Stroke Completed     1/13/2021 2143 Inpatient Neurology Consult Stroke Completed     1/13/2021 1245 Inpatient Palliative Care MD Consult Completed           Exam confirms with auscultation zero audible heart tones and zero audible respirations. Mr.Aaron Grace was pronounced dead at 1730, 01/19/2021.  MD notified by Patient's RN.     Abdullahi Lozada, RN  Clinical House  Supervisor  1/19/2021 18:01 EST    Miladys Albert, KRISTA, MHA, APRN  [facility] Navigators  Hospice and Palliative Care Nurse Practitioner  01/20/21  15:13 EST

## 2021-01-25 LAB
QT INTERVAL: 354 MS
QTC INTERVAL: 461 MS